# Patient Record
Sex: MALE | Race: BLACK OR AFRICAN AMERICAN | NOT HISPANIC OR LATINO | Employment: OTHER | URBAN - METROPOLITAN AREA
[De-identification: names, ages, dates, MRNs, and addresses within clinical notes are randomized per-mention and may not be internally consistent; named-entity substitution may affect disease eponyms.]

---

## 2018-05-15 ENCOUNTER — HISTORICAL (OUTPATIENT)
Dept: RADIOLOGY | Facility: HOSPITAL | Age: 43
End: 2018-05-15

## 2018-05-18 ENCOUNTER — HISTORICAL (OUTPATIENT)
Dept: ADMINISTRATIVE | Facility: HOSPITAL | Age: 43
End: 2018-05-18

## 2018-05-18 LAB
ABS NEUT (OLG): 5.41 X10(3)/MCL (ref 2.1–9.2)
BASOPHILS # BLD AUTO: 0 X10(3)/MCL (ref 0–0.2)
BASOPHILS NFR BLD AUTO: 0 %
EOSINOPHIL # BLD AUTO: 0 X10(3)/MCL (ref 0–0.9)
EOSINOPHIL NFR BLD AUTO: 1 %
ERYTHROCYTE [DISTWIDTH] IN BLOOD BY AUTOMATED COUNT: 12.8 % (ref 11.5–17)
ERYTHROCYTE [SEDIMENTATION RATE] IN BLOOD: 2 MM/HR (ref 0–15)
HCT VFR BLD AUTO: 50.6 % (ref 42–52)
HGB BLD-MCNC: 16.8 GM/DL (ref 14–18)
LYMPHOCYTES # BLD AUTO: 2.1 X10(3)/MCL (ref 0.6–4.6)
LYMPHOCYTES NFR BLD AUTO: 24 %
MCH RBC QN AUTO: 29.9 PG (ref 27–31)
MCHC RBC AUTO-ENTMCNC: 33.2 GM/DL (ref 33–36)
MCV RBC AUTO: 90.2 FL (ref 80–94)
MONOCYTES # BLD AUTO: 1 X10(3)/MCL (ref 0.1–1.3)
MONOCYTES NFR BLD AUTO: 12 %
NEUTROPHILS # BLD AUTO: 5.41 X10(3)/MCL (ref 1.4–7.9)
NEUTROPHILS NFR BLD AUTO: 63 %
PLATELET # BLD AUTO: 209 X10(3)/MCL (ref 130–400)
PMV BLD AUTO: 9.3 FL (ref 9.4–12.4)
RBC # BLD AUTO: 5.61 X10(6)/MCL (ref 4.7–6.1)
WBC # SPEC AUTO: 8.6 X10(3)/MCL (ref 4.5–11.5)

## 2018-08-09 ENCOUNTER — HISTORICAL (OUTPATIENT)
Dept: RADIOLOGY | Facility: HOSPITAL | Age: 43
End: 2018-08-09

## 2018-11-28 ENCOUNTER — HISTORICAL (OUTPATIENT)
Dept: RADIOLOGY | Facility: HOSPITAL | Age: 43
End: 2018-11-28

## 2018-12-03 ENCOUNTER — HISTORICAL (OUTPATIENT)
Dept: RADIOLOGY | Facility: HOSPITAL | Age: 43
End: 2018-12-03

## 2018-12-05 ENCOUNTER — HISTORICAL (OUTPATIENT)
Dept: PREADMISSION TESTING | Facility: HOSPITAL | Age: 43
End: 2018-12-05

## 2018-12-05 ENCOUNTER — HISTORICAL (OUTPATIENT)
Dept: LAB | Facility: HOSPITAL | Age: 43
End: 2018-12-05

## 2018-12-05 LAB
ABS NEUT (OLG): 5.08 X10(3)/MCL (ref 2.1–9.2)
APPEARANCE, UA: CLEAR
BACTERIA SPEC CULT: NORMAL /HPF
BASOPHILS # BLD AUTO: 0 X10(3)/MCL (ref 0–0.2)
BASOPHILS NFR BLD AUTO: 0 %
BILIRUB UR QL STRIP: NEGATIVE
BUN SERPL-MCNC: 13 MG/DL (ref 7–18)
CALCIUM SERPL-MCNC: 9.2 MG/DL (ref 8.5–10.1)
CHLORIDE SERPL-SCNC: 100 MMOL/L (ref 98–107)
CO2 SERPL-SCNC: 30 MMOL/L (ref 21–32)
COLOR UR: YELLOW
CREAT SERPL-MCNC: 1.19 MG/DL (ref 0.7–1.3)
CREAT/UREA NIT SERPL: 10.9
EOSINOPHIL # BLD AUTO: 0 X10(3)/MCL (ref 0–0.9)
EOSINOPHIL NFR BLD AUTO: 0 %
ERYTHROCYTE [DISTWIDTH] IN BLOOD BY AUTOMATED COUNT: 12.7 % (ref 11.5–17)
GLUCOSE (UA): NEGATIVE
GLUCOSE SERPL-MCNC: 101 MG/DL (ref 74–106)
HCT VFR BLD AUTO: 52.9 % (ref 42–52)
HGB BLD-MCNC: 17.2 GM/DL (ref 14–18)
HGB UR QL STRIP: NEGATIVE
KETONES UR QL STRIP: NEGATIVE
LEUKOCYTE ESTERASE UR QL STRIP: NEGATIVE
LYMPHOCYTES # BLD AUTO: 1.8 X10(3)/MCL (ref 0.6–4.6)
LYMPHOCYTES NFR BLD AUTO: 24 %
MCH RBC QN AUTO: 28.8 PG (ref 27–31)
MCHC RBC AUTO-ENTMCNC: 32.5 GM/DL (ref 33–36)
MCV RBC AUTO: 88.6 FL (ref 80–94)
MONOCYTES # BLD AUTO: 0.5 X10(3)/MCL (ref 0.1–1.3)
MONOCYTES NFR BLD AUTO: 7 %
NEUTROPHILS # BLD AUTO: 5.08 X10(3)/MCL (ref 2.1–9.2)
NEUTROPHILS NFR BLD AUTO: 68 %
NITRITE UR QL STRIP: NEGATIVE
PH UR STRIP: 7 [PH] (ref 5–9)
PLATELET # BLD AUTO: 222 X10(3)/MCL (ref 130–400)
PMV BLD AUTO: 10.1 FL (ref 9.4–12.4)
POTASSIUM SERPL-SCNC: 3.8 MMOL/L (ref 3.5–5.1)
PROT UR QL STRIP: NEGATIVE
RBC # BLD AUTO: 5.97 X10(6)/MCL (ref 4.7–6.1)
RBC #/AREA URNS HPF: NORMAL /[HPF]
SODIUM SERPL-SCNC: 140 MMOL/L (ref 136–145)
SP GR UR STRIP: 1.01 (ref 1–1.03)
SQUAMOUS EPITHELIAL, UA: NORMAL
UROBILINOGEN UR STRIP-ACNC: 1
WBC # SPEC AUTO: 7.5 X10(3)/MCL (ref 4.5–11.5)
WBC #/AREA URNS HPF: NORMAL /HPF

## 2018-12-12 ENCOUNTER — HISTORICAL (OUTPATIENT)
Dept: SURGERY | Facility: HOSPITAL | Age: 43
End: 2018-12-12

## 2019-03-20 ENCOUNTER — HISTORICAL (OUTPATIENT)
Dept: RADIOLOGY | Facility: HOSPITAL | Age: 44
End: 2019-03-20

## 2019-12-23 ENCOUNTER — HISTORICAL (OUTPATIENT)
Dept: RADIOLOGY | Facility: HOSPITAL | Age: 44
End: 2019-12-23

## 2020-03-17 ENCOUNTER — HISTORICAL (OUTPATIENT)
Dept: RADIOLOGY | Facility: HOSPITAL | Age: 45
End: 2020-03-17

## 2020-09-17 ENCOUNTER — HISTORICAL (OUTPATIENT)
Dept: RADIOLOGY | Facility: HOSPITAL | Age: 45
End: 2020-09-17

## 2021-04-07 ENCOUNTER — HISTORICAL (OUTPATIENT)
Dept: ADMINISTRATIVE | Facility: HOSPITAL | Age: 46
End: 2021-04-07

## 2022-03-23 ENCOUNTER — HISTORICAL (OUTPATIENT)
Dept: RADIOLOGY | Facility: HOSPITAL | Age: 47
End: 2022-03-23

## 2022-04-11 ENCOUNTER — HISTORICAL (OUTPATIENT)
Dept: ADMINISTRATIVE | Facility: HOSPITAL | Age: 47
End: 2022-04-11

## 2022-04-27 VITALS
WEIGHT: 265.44 LBS | SYSTOLIC BLOOD PRESSURE: 128 MMHG | DIASTOLIC BLOOD PRESSURE: 88 MMHG | BODY MASS INDEX: 35.95 KG/M2 | OXYGEN SATURATION: 98 % | HEIGHT: 72 IN

## 2022-06-13 ENCOUNTER — HOSPITAL ENCOUNTER (OUTPATIENT)
Dept: RADIOLOGY | Facility: HOSPITAL | Age: 47
Discharge: HOME | End: 2022-06-13
Attending: INTERNAL MEDICINE
Payer: COMMERCIAL

## 2022-06-13 DIAGNOSIS — M25.531 RIGHT WRIST PAIN: ICD-10-CM

## 2022-06-13 DIAGNOSIS — M25.531 RIGHT WRIST PAIN: Primary | ICD-10-CM

## 2022-06-13 PROCEDURE — 73100 X-RAY EXAM OF WRIST: CPT | Mod: TC,RT

## 2022-07-06 DIAGNOSIS — M25.531 RIGHT WRIST PAIN: Primary | ICD-10-CM

## 2022-07-07 ENCOUNTER — OFFICE VISIT (OUTPATIENT)
Dept: ORTHOPEDICS | Facility: CLINIC | Age: 47
End: 2022-07-07
Payer: COMMERCIAL

## 2022-07-07 VITALS — WEIGHT: 260 LBS | HEIGHT: 72 IN | BODY MASS INDEX: 35.21 KG/M2

## 2022-07-07 DIAGNOSIS — M25.331 SCAPHO-LUNATE DISSOCIATION, RIGHT: Primary | ICD-10-CM

## 2022-07-07 DIAGNOSIS — M25.531 RIGHT WRIST PAIN: ICD-10-CM

## 2022-07-07 PROCEDURE — 99213 PR OFFICE/OUTPT VISIT, EST, LEVL III, 20-29 MIN: ICD-10-PCS | Mod: ,,, | Performed by: ORTHOPAEDIC SURGERY

## 2022-07-07 PROCEDURE — 4010F ACE/ARB THERAPY RXD/TAKEN: CPT | Mod: CPTII,,, | Performed by: ORTHOPAEDIC SURGERY

## 2022-07-07 PROCEDURE — 1160F PR REVIEW ALL MEDS BY PRESCRIBER/CLIN PHARMACIST DOCUMENTED: ICD-10-PCS | Mod: CPTII,,, | Performed by: ORTHOPAEDIC SURGERY

## 2022-07-07 PROCEDURE — 1160F RVW MEDS BY RX/DR IN RCRD: CPT | Mod: CPTII,,, | Performed by: ORTHOPAEDIC SURGERY

## 2022-07-07 PROCEDURE — 99213 OFFICE O/P EST LOW 20 MIN: CPT | Mod: ,,, | Performed by: ORTHOPAEDIC SURGERY

## 2022-07-07 PROCEDURE — 3008F BODY MASS INDEX DOCD: CPT | Mod: CPTII,,, | Performed by: ORTHOPAEDIC SURGERY

## 2022-07-07 PROCEDURE — 1159F MED LIST DOCD IN RCRD: CPT | Mod: CPTII,,, | Performed by: ORTHOPAEDIC SURGERY

## 2022-07-07 PROCEDURE — 1159F PR MEDICATION LIST DOCUMENTED IN MEDICAL RECORD: ICD-10-PCS | Mod: CPTII,,, | Performed by: ORTHOPAEDIC SURGERY

## 2022-07-07 PROCEDURE — 3008F PR BODY MASS INDEX (BMI) DOCUMENTED: ICD-10-PCS | Mod: CPTII,,, | Performed by: ORTHOPAEDIC SURGERY

## 2022-07-07 PROCEDURE — 4010F PR ACE/ARB THEARPY RXD/TAKEN: ICD-10-PCS | Mod: CPTII,,, | Performed by: ORTHOPAEDIC SURGERY

## 2022-07-07 RX ORDER — MELOXICAM 15 MG/1
15 TABLET ORAL DAILY
COMMUNITY
Start: 2022-06-22 | End: 2024-01-31 | Stop reason: ALTCHOICE

## 2022-07-07 RX ORDER — LISINOPRIL AND HYDROCHLOROTHIAZIDE 20; 25 MG/1; MG/1
1 TABLET ORAL DAILY
COMMUNITY
Start: 2022-04-06

## 2022-07-07 RX ORDER — FEBUXOSTAT 40 MG/1
80 TABLET, FILM COATED ORAL DAILY
COMMUNITY
Start: 2022-06-28

## 2022-07-07 RX ORDER — DEXTROAMPHETAMINE SACCHARATE, AMPHETAMINE ASPARTATE, DEXTROAMPHETAMINE SULFATE AND AMPHETAMINE SULFATE 5; 5; 5; 5 MG/1; MG/1; MG/1; MG/1
1 TABLET ORAL 2 TIMES DAILY
COMMUNITY
Start: 2022-04-27

## 2022-07-07 NOTE — PROGRESS NOTES
Subjective:    CC: Pain of the Right Wrist and Wrist Pain (no prior sx or injury, pt been dealing with this for over 2 months, had xr done at [facility], pain level is a 6- not taking nothing for pain)       HPI:  Patient comes in today for his 1st visit.  Patient states he felt a pop in his right wrist over 2 months ago.  Since he has been having some pain and swelling.  It is not improved.  He has been in splint, treated by his PCP.  He is present here for his 1st visit.    ROS: Refer to HPI for pertinent ROS. All other 12 point systems negative.    Objective:    Physical Exam:  Patient is well-nourished developed male he is awake alert and orient x3 he has an apparent stress is pleasant and cooperative examination of the right hand and wrist compartment soft and warm.  Skin is intact.  He does have a fluid collection on the dorsal surface of the dorsal surface.  He is point tender along the proximal row.  He is able to make a full fist has full extension, is neurovascularly intact distally.    Images: . Images Reviewed and discussed with patient.    Assessment:  1. Right wrist pain  - Ambulatory referral/consult to Orthopedics    2. Scapho-lunate dissociation, right  - MRI Wrist Joint Without Contrast Right; Future        Plan:  At this time we discussed his physical exam and outside x-rays.  I am concerned for a scaphoid lunate ligament tear.  He does have a suspected Rudy Roderick sign appreciated on his outside x-rays.  He will continue with the splint we will proceed with an MRI of his right wrist, like see him back later this week for his results.    Follow UP: No follow-ups on file.

## 2022-07-08 ENCOUNTER — HOSPITAL ENCOUNTER (OUTPATIENT)
Dept: RADIOLOGY | Facility: HOSPITAL | Age: 47
Discharge: HOME | End: 2022-07-08
Attending: ORTHOPAEDIC SURGERY
Payer: COMMERCIAL

## 2022-07-08 DIAGNOSIS — M25.331 SCAPHO-LUNATE DISSOCIATION, RIGHT: ICD-10-CM

## 2022-07-08 PROCEDURE — 73221 MRI JOINT UPR EXTREM W/O DYE: CPT | Mod: TC,RT

## 2022-07-11 ENCOUNTER — OFFICE VISIT (OUTPATIENT)
Dept: ORTHOPEDICS | Facility: CLINIC | Age: 47
End: 2022-07-11
Payer: COMMERCIAL

## 2022-07-11 DIAGNOSIS — M25.331 SCAPHO-LUNATE DISSOCIATION, RIGHT: Primary | ICD-10-CM

## 2022-07-11 PROCEDURE — 4010F ACE/ARB THERAPY RXD/TAKEN: CPT | Mod: CPTII,,, | Performed by: ORTHOPAEDIC SURGERY

## 2022-07-11 PROCEDURE — 4010F PR ACE/ARB THEARPY RXD/TAKEN: ICD-10-PCS | Mod: CPTII,,, | Performed by: ORTHOPAEDIC SURGERY

## 2022-07-11 PROCEDURE — 99213 OFFICE O/P EST LOW 20 MIN: CPT | Mod: ,,, | Performed by: ORTHOPAEDIC SURGERY

## 2022-07-11 PROCEDURE — 1159F MED LIST DOCD IN RCRD: CPT | Mod: CPTII,,, | Performed by: ORTHOPAEDIC SURGERY

## 2022-07-11 PROCEDURE — 1160F PR REVIEW ALL MEDS BY PRESCRIBER/CLIN PHARMACIST DOCUMENTED: ICD-10-PCS | Mod: CPTII,,, | Performed by: ORTHOPAEDIC SURGERY

## 2022-07-11 PROCEDURE — 99213 PR OFFICE/OUTPT VISIT, EST, LEVL III, 20-29 MIN: ICD-10-PCS | Mod: ,,, | Performed by: ORTHOPAEDIC SURGERY

## 2022-07-11 PROCEDURE — 1160F RVW MEDS BY RX/DR IN RCRD: CPT | Mod: CPTII,,, | Performed by: ORTHOPAEDIC SURGERY

## 2022-07-11 PROCEDURE — 1159F PR MEDICATION LIST DOCUMENTED IN MEDICAL RECORD: ICD-10-PCS | Mod: CPTII,,, | Performed by: ORTHOPAEDIC SURGERY

## 2022-07-11 NOTE — PROGRESS NOTES
Subjective:    CC: Pain of the Right Wrist and Results (R wrist MRI results - pt is wearing a wrist brace today - he states no changes from last visit)       HPI:  Patient returns today for repeat exam.  Patient had an MRI of his right wrist.  He does have a complete tear of the SL ligament.  He is currently in a wrist brace.  He denies any new complaints.    ROS: Refer to HPI for pertinent ROS. All other 12 point systems negative.    Objective:    Physical Exam:  Examination of the right wrist he does have a palpable defect along the dorsal surface of the right wrist, he is appropriately tender in this area.  He is able to make a full fist has full extension.  He is otherwise stable to stressing, neurovascular intact distally.    Images:  MRI was reviewed. Images Reviewed and discussed with patient.    Assessment:  1. Scapho-lunate dissociation, right        Plan:  At this time we discussed his physical exam and x-ray findings.  We have discussed his MRI as well.  We have discussed various treatment options.  He would like to proceed with surgery.  We have discussed following up with 1 of my partners who performs this procedure, we will set this up at his convenience.    Follow UP: No follow-ups on file.               Birth Control Pills Counseling: Birth Control Pill Counseling: I discussed with the patient the potential side effects of OCPs including but not limited to increased risk of stroke, heart attack, thrombophlebitis, deep venous thrombosis, hepatic adenomas, breast changes, GI upset, headaches, and depression.  The patient verbalized understanding of the proper use and possible adverse effects of OCPs. All of the patient's questions and concerns were addressed.

## 2022-07-12 ENCOUNTER — OFFICE VISIT (OUTPATIENT)
Dept: ORTHOPEDICS | Facility: CLINIC | Age: 47
End: 2022-07-12
Payer: COMMERCIAL

## 2022-07-12 ENCOUNTER — CLINICAL SUPPORT (OUTPATIENT)
Dept: LAB | Facility: HOSPITAL | Age: 47
End: 2022-07-12
Attending: SPECIALIST
Payer: COMMERCIAL

## 2022-07-12 VITALS — HEIGHT: 72 IN | BODY MASS INDEX: 35.21 KG/M2 | WEIGHT: 260 LBS

## 2022-07-12 DIAGNOSIS — S63.8X1A TEAR OF RIGHT SCAPHOLUNATE LIGAMENT, INITIAL ENCOUNTER: Primary | ICD-10-CM

## 2022-07-12 DIAGNOSIS — S63.8X1A TEAR OF RIGHT SCAPHOLUNATE LIGAMENT, INITIAL ENCOUNTER: ICD-10-CM

## 2022-07-12 PROCEDURE — 1160F PR REVIEW ALL MEDS BY PRESCRIBER/CLIN PHARMACIST DOCUMENTED: ICD-10-PCS | Mod: CPTII,,, | Performed by: SPECIALIST

## 2022-07-12 PROCEDURE — 4010F PR ACE/ARB THEARPY RXD/TAKEN: ICD-10-PCS | Mod: CPTII,,, | Performed by: SPECIALIST

## 2022-07-12 PROCEDURE — 93005 ELECTROCARDIOGRAM TRACING: CPT

## 2022-07-12 PROCEDURE — 1159F MED LIST DOCD IN RCRD: CPT | Mod: CPTII,,, | Performed by: SPECIALIST

## 2022-07-12 PROCEDURE — 1159F PR MEDICATION LIST DOCUMENTED IN MEDICAL RECORD: ICD-10-PCS | Mod: CPTII,,, | Performed by: SPECIALIST

## 2022-07-12 PROCEDURE — 3008F BODY MASS INDEX DOCD: CPT | Mod: CPTII,,, | Performed by: SPECIALIST

## 2022-07-12 PROCEDURE — 4010F ACE/ARB THERAPY RXD/TAKEN: CPT | Mod: CPTII,,, | Performed by: SPECIALIST

## 2022-07-12 PROCEDURE — 99213 OFFICE O/P EST LOW 20 MIN: CPT | Mod: ,,, | Performed by: SPECIALIST

## 2022-07-12 PROCEDURE — 93010 EKG 12-LEAD: ICD-10-PCS | Mod: ,,, | Performed by: INTERNAL MEDICINE

## 2022-07-12 PROCEDURE — 99213 PR OFFICE/OUTPT VISIT, EST, LEVL III, 20-29 MIN: ICD-10-PCS | Mod: ,,, | Performed by: SPECIALIST

## 2022-07-12 PROCEDURE — 93010 ELECTROCARDIOGRAM REPORT: CPT | Mod: ,,, | Performed by: INTERNAL MEDICINE

## 2022-07-12 PROCEDURE — 1160F RVW MEDS BY RX/DR IN RCRD: CPT | Mod: CPTII,,, | Performed by: SPECIALIST

## 2022-07-12 PROCEDURE — 3008F PR BODY MASS INDEX (BMI) DOCUMENTED: ICD-10-PCS | Mod: CPTII,,, | Performed by: SPECIALIST

## 2022-07-12 RX ORDER — SODIUM CHLORIDE 9 MG/ML
INJECTION, SOLUTION INTRAVENOUS CONTINUOUS
Status: CANCELLED | OUTPATIENT
Start: 2022-07-12

## 2022-07-12 NOTE — ADDENDUM NOTE
Addended by: RIDGE SILVERMAN on: 7/12/2022 12:04 PM     Modules accepted: Fatou, SmartSet     Prior Authorization Retail Medication Request    Medication/Dose: Tolterodine  ICD code (if different than what is on RX):  na  Previously Tried and Failed:    Rationale:      Insurance Name:  [employer] Medicare  Insurance ID:  [id number]      Pharmacy Information (if different than what is on RX)  Name:  [facility]  Phone:  [phone]

## 2022-07-12 NOTE — H&P (VIEW-ONLY)
Chief Complaint:   Chief Complaint   Patient presents with    Pre-op Exam     R wrist S/L ligament pre op,          History of present illness:    This is a 47 y.o. year old male who complains of right wrist pain  Started in May when he lifted a crawfish basket  He has had x-rays and MRI   Wearing a brace for pain      Past Medical History:   Diagnosis Date    Hypertension        Past Surgical History:   Procedure Laterality Date    left elbow         Current Outpatient Medications   Medication Instructions    dextroamphetamine-amphetamine (ADDERALL) 20 mg tablet 1 tablet, Oral, 2 times daily    febuxostat (ULORIC) 40 mg, Oral, Daily    lisinopriL-hydrochlorothiazide (PRINZIDE,ZESTORETIC) 20-25 mg Tab 1 tablet, Oral, Daily    meloxicam (MOBIC) 15 mg, Oral, Daily        Review of patient's allergies indicates:  No Known Allergies    History reviewed. No pertinent family history.        Review of Systems:    Constitution:   Denies chills, fever, and sweats.  HENT:   Denies headaches or blurry vision.  Cardiovascular:  Denies chest pain or irregular heart beat.  Respiratory:   Denies cough or shortness of breath.  Gastrointestinal:  Denies abdominal pain, nausea, or vomiting.  Musculoskeletal:   Denies muscle cramps.  Neurological:   Denies dizziness or focal weakness.  Psychiatric/Behavior: Normal mental status.  Hematology/Lymph:  Denies bleeding problem or easy bruising/bleeding.  Skin:    Denies rash or suspicious lesions.    Examination:    Vital Signs:    Vitals:    07/12/22 1118   Weight: 117.9 kg (260 lb)   Height: 6' (1.829 m)       Body mass index is 35.26 kg/m².    Constitution:   Well-developed, well nourished patient in no acute distress.  Neurological:   Alert and oriented x 3 and cooperative to examination.     Psychiatric/Behavior: Normal mental status.  Respiratory:   No shortness of breath.  Eyes:    Extraoccular muscles intact  Skin:    No scars, rash or suspicious lesions.    Musculoskeletal  Exam :     No obvious deformity.  positive tenderness over radial/scaphoid junction  Limited ROM with stiffness and pain  Negative Tinel's test.  Negative Finkelstein's test.  3/5 strength, normal skin appearance and palpable pulses     X-rays and MRI reviewed  scapholunate dissociation    Assessment: There are no diagnoses linked to this encounter.    Plan:  Scapholunate ligament repain   Discussion of orthopedic surgery limitations and risks, benefits, alternatives, and complications of operative and nonoperative treatments were discussed with patient and family. They understand, agree and want to proceed with operation/procedure   Discussion of orthopedic surgery limitations and risks: recurrence of problem, pain, deformity: problems with prosthesis, prosthesis dislocation, prosthesis loosening, prosthesis failure, problems with implanted hardware, loosening of implanted hardware, failure of implanted hardware, reaction of soft tissue to implanted hardware, protrusion of implanted hardware, pain from implanted hardware, stiffness, nerve injury, vascular injury, skin necrosis, tendon adhesion.        DISCLAIMER: This note may have been dictated using voice recognition software and may contain grammatical errors.     NOTE: Consult report sent to referring provider via [employer].

## 2022-07-19 ENCOUNTER — ANESTHESIA EVENT (OUTPATIENT)
Dept: SURGERY | Facility: HOSPITAL | Age: 47
End: 2022-07-19
Payer: COMMERCIAL

## 2022-07-19 RX ORDER — CHOLECALCIFEROL (VITAMIN D3) 25 MCG
5000 TABLET ORAL DAILY
COMMUNITY

## 2022-07-25 ENCOUNTER — HOSPITAL ENCOUNTER (OUTPATIENT)
Facility: HOSPITAL | Age: 47
Discharge: HOME | End: 2022-07-25
Attending: SPECIALIST | Admitting: SPECIALIST
Payer: COMMERCIAL

## 2022-07-25 ENCOUNTER — ANESTHESIA (OUTPATIENT)
Dept: SURGERY | Facility: HOSPITAL | Age: 47
End: 2022-07-25
Payer: COMMERCIAL

## 2022-07-25 DIAGNOSIS — S63.8X1A TEAR OF RIGHT SCAPHOLUNATE LIGAMENT, INITIAL ENCOUNTER: ICD-10-CM

## 2022-07-25 PROCEDURE — 25000003 PHARM REV CODE 250: Performed by: SPECIALIST

## 2022-07-25 PROCEDURE — 25320 PR REVISE WRIST JOINT,CARPAL INSTABIL: ICD-10-PCS | Mod: AS,RT,, | Performed by: PHYSICIAN ASSISTANT

## 2022-07-25 PROCEDURE — 25320 REPAIR/REVISE WRIST JOINT: CPT | Mod: RT,,, | Performed by: SPECIALIST

## 2022-07-25 PROCEDURE — 36000706: Performed by: SPECIALIST

## 2022-07-25 PROCEDURE — 71000016 HC POSTOP RECOV ADDL HR: Performed by: SPECIALIST

## 2022-07-25 PROCEDURE — 36000707: Performed by: SPECIALIST

## 2022-07-25 PROCEDURE — 25320 PR REVISE WRIST JOINT,CARPAL INSTABIL: ICD-10-PCS | Mod: RT,,, | Performed by: SPECIALIST

## 2022-07-25 PROCEDURE — 63600175 PHARM REV CODE 636 W HCPCS: Performed by: ANESTHESIOLOGY

## 2022-07-25 PROCEDURE — 71000015 HC POSTOP RECOV 1ST HR: Performed by: SPECIALIST

## 2022-07-25 PROCEDURE — 37000008 HC ANESTHESIA 1ST 15 MINUTES: Performed by: SPECIALIST

## 2022-07-25 PROCEDURE — 25000003 PHARM REV CODE 250: Performed by: ANESTHESIOLOGY

## 2022-07-25 PROCEDURE — 64415 NJX AA&/STRD BRCH PLXS IMG: CPT | Performed by: ANESTHESIOLOGY

## 2022-07-25 PROCEDURE — 63600175 PHARM REV CODE 636 W HCPCS

## 2022-07-25 PROCEDURE — 63600175 PHARM REV CODE 636 W HCPCS: Performed by: NURSE ANESTHETIST, CERTIFIED REGISTERED

## 2022-07-25 PROCEDURE — 25320 REPAIR/REVISE WRIST JOINT: CPT | Mod: AS,RT,, | Performed by: PHYSICIAN ASSISTANT

## 2022-07-25 PROCEDURE — 37000009 HC ANESTHESIA EA ADD 15 MINS: Performed by: SPECIALIST

## 2022-07-25 PROCEDURE — 25000003 PHARM REV CODE 250: Performed by: NURSE ANESTHETIST, CERTIFIED REGISTERED

## 2022-07-25 PROCEDURE — C1713 ANCHOR/SCREW BN/BN,TIS/BN: HCPCS | Performed by: SPECIALIST

## 2022-07-25 DEVICE — ANCHOR DX SWIVELOCK SL 3.5X8: Type: IMPLANTABLE DEVICE | Site: WRIST | Status: FUNCTIONAL

## 2022-07-25 DEVICE — KIT INTERNALBRACE HAND/WRIST: Type: IMPLANTABLE DEVICE | Site: WRIST | Status: FUNCTIONAL

## 2022-07-25 RX ORDER — DEXAMETHASONE SODIUM PHOSPHATE 4 MG/ML
INJECTION, SOLUTION INTRA-ARTICULAR; INTRALESIONAL; INTRAMUSCULAR; INTRAVENOUS; SOFT TISSUE
Status: DISCONTINUED | OUTPATIENT
Start: 2022-07-25 | End: 2022-07-25

## 2022-07-25 RX ORDER — SODIUM CHLORIDE 0.9 G/100ML
IRRIGANT IRRIGATION
Status: DISCONTINUED | OUTPATIENT
Start: 2022-07-25 | End: 2022-07-25 | Stop reason: HOSPADM

## 2022-07-25 RX ORDER — SODIUM CHLORIDE 9 MG/ML
INJECTION, SOLUTION INTRAVENOUS CONTINUOUS
Status: DISCONTINUED | OUTPATIENT
Start: 2022-07-25 | End: 2022-07-25 | Stop reason: HOSPADM

## 2022-07-25 RX ORDER — ROPIVACAINE HYDROCHLORIDE 5 MG/ML
INJECTION, SOLUTION EPIDURAL; INFILTRATION; PERINEURAL
Status: DISCONTINUED | OUTPATIENT
Start: 2022-07-25 | End: 2022-07-25

## 2022-07-25 RX ORDER — ONDANSETRON 2 MG/ML
INJECTION INTRAMUSCULAR; INTRAVENOUS
Status: DISCONTINUED | OUTPATIENT
Start: 2022-07-25 | End: 2022-07-25

## 2022-07-25 RX ORDER — LIDOCAINE HYDROCHLORIDE 10 MG/ML
1 INJECTION, SOLUTION EPIDURAL; INFILTRATION; INTRACAUDAL; PERINEURAL ONCE
Status: DISCONTINUED | OUTPATIENT
Start: 2022-07-25 | End: 2022-07-25 | Stop reason: HOSPADM

## 2022-07-25 RX ORDER — GABAPENTIN 300 MG/1
300 CAPSULE ORAL
Status: COMPLETED | OUTPATIENT
Start: 2022-07-25 | End: 2022-07-25

## 2022-07-25 RX ORDER — ONDANSETRON 2 MG/ML
4 INJECTION INTRAMUSCULAR; INTRAVENOUS DAILY PRN
Status: CANCELLED | OUTPATIENT
Start: 2022-07-25

## 2022-07-25 RX ORDER — MIDAZOLAM HYDROCHLORIDE 1 MG/ML
INJECTION INTRAMUSCULAR; INTRAVENOUS
Status: DISCONTINUED | OUTPATIENT
Start: 2022-07-25 | End: 2022-07-25

## 2022-07-25 RX ORDER — ONDANSETRON 4 MG/1
4 TABLET, ORALLY DISINTEGRATING ORAL ONCE
Status: COMPLETED | OUTPATIENT
Start: 2022-07-25 | End: 2022-07-25

## 2022-07-25 RX ORDER — DIPHENHYDRAMINE HYDROCHLORIDE 50 MG/ML
25 INJECTION INTRAMUSCULAR; INTRAVENOUS EVERY 6 HOURS PRN
Status: CANCELLED | OUTPATIENT
Start: 2022-07-25

## 2022-07-25 RX ORDER — ACETAMINOPHEN 500 MG
1000 TABLET ORAL
Status: COMPLETED | OUTPATIENT
Start: 2022-07-25 | End: 2022-07-25

## 2022-07-25 RX ORDER — METOCLOPRAMIDE HYDROCHLORIDE 5 MG/ML
10 INJECTION INTRAMUSCULAR; INTRAVENOUS EVERY 10 MIN PRN
Status: CANCELLED | OUTPATIENT
Start: 2022-07-25

## 2022-07-25 RX ORDER — MIDAZOLAM HYDROCHLORIDE 1 MG/ML
2 INJECTION INTRAMUSCULAR; INTRAVENOUS ONCE AS NEEDED
Status: COMPLETED | OUTPATIENT
Start: 2022-07-25 | End: 2022-07-25

## 2022-07-25 RX ORDER — PHENYLEPHRINE HYDROCHLORIDE 10 MG/ML
INJECTION INTRAVENOUS
Status: DISCONTINUED | OUTPATIENT
Start: 2022-07-25 | End: 2022-07-25

## 2022-07-25 RX ORDER — MIDAZOLAM HYDROCHLORIDE 1 MG/ML
INJECTION INTRAMUSCULAR; INTRAVENOUS
Status: COMPLETED
Start: 2022-07-25 | End: 2022-07-25

## 2022-07-25 RX ORDER — ROPIVACAINE HYDROCHLORIDE 5 MG/ML
INJECTION, SOLUTION EPIDURAL; INFILTRATION; PERINEURAL
Status: DISCONTINUED
Start: 2022-07-25 | End: 2022-07-25 | Stop reason: HOSPADM

## 2022-07-25 RX ORDER — CEFAZOLIN SODIUM IN 0.9 % NACL 2 G/100 ML
PLASTIC BAG, INJECTION (ML) INTRAVENOUS
Status: DISCONTINUED | OUTPATIENT
Start: 2022-07-25 | End: 2022-07-25

## 2022-07-25 RX ORDER — OXYCODONE AND ACETAMINOPHEN 5; 325 MG/1; MG/1
1 TABLET ORAL EVERY 4 HOURS PRN
Qty: 24 TABLET | Refills: 0 | Status: SHIPPED | OUTPATIENT
Start: 2022-07-25 | End: 2022-08-01 | Stop reason: SDUPTHER

## 2022-07-25 RX ORDER — SODIUM CHLORIDE, SODIUM GLUCONATE, SODIUM ACETATE, POTASSIUM CHLORIDE AND MAGNESIUM CHLORIDE 30; 37; 368; 526; 502 MG/100ML; MG/100ML; MG/100ML; MG/100ML; MG/100ML
1000 INJECTION, SOLUTION INTRAVENOUS CONTINUOUS
Status: DISCONTINUED | OUTPATIENT
Start: 2022-07-25 | End: 2022-07-25 | Stop reason: HOSPADM

## 2022-07-25 RX ORDER — CEFAZOLIN SODIUM 2 G/50ML
2 SOLUTION INTRAVENOUS
Status: DISCONTINUED | OUTPATIENT
Start: 2022-07-25 | End: 2022-07-25 | Stop reason: HOSPADM

## 2022-07-25 RX ORDER — SODIUM CHLORIDE 0.9 % (FLUSH) 0.9 %
3 SYRINGE (ML) INJECTION
Status: DISCONTINUED | OUTPATIENT
Start: 2022-07-25 | End: 2022-07-25 | Stop reason: HOSPADM

## 2022-07-25 RX ORDER — PROPOFOL 10 MG/ML
VIAL (ML) INTRAVENOUS CONTINUOUS PRN
Status: DISCONTINUED | OUTPATIENT
Start: 2022-07-25 | End: 2022-07-25

## 2022-07-25 RX ORDER — HYDROMORPHONE HYDROCHLORIDE 2 MG/ML
0.2 INJECTION, SOLUTION INTRAMUSCULAR; INTRAVENOUS; SUBCUTANEOUS EVERY 5 MIN PRN
Status: CANCELLED | OUTPATIENT
Start: 2022-07-25

## 2022-07-25 RX ADMIN — SODIUM CHLORIDE, SODIUM GLUCONATE, SODIUM ACETATE, POTASSIUM CHLORIDE AND MAGNESIUM CHLORIDE 1000 ML: 526; 502; 368; 37; 30 INJECTION, SOLUTION INTRAVENOUS at 07:07

## 2022-07-25 RX ADMIN — PHENYLEPHRINE HYDROCHLORIDE 100 MCG: 10 INJECTION INTRAVENOUS at 10:07

## 2022-07-25 RX ADMIN — GABAPENTIN 300 MG: 300 CAPSULE ORAL at 07:07

## 2022-07-25 RX ADMIN — ONDANSETRON 4 MG: 4 TABLET, ORALLY DISINTEGRATING ORAL at 07:07

## 2022-07-25 RX ADMIN — MIDAZOLAM 2 MG: 1 INJECTION INTRAMUSCULAR; INTRAVENOUS at 07:07

## 2022-07-25 RX ADMIN — PHENYLEPHRINE HYDROCHLORIDE 50 MCG: 10 INJECTION INTRAVENOUS at 09:07

## 2022-07-25 RX ADMIN — MIDAZOLAM HYDROCHLORIDE 2 MG: 1 INJECTION INTRAMUSCULAR; INTRAVENOUS at 07:07

## 2022-07-25 RX ADMIN — PHENYLEPHRINE HYDROCHLORIDE 100 MCG: 10 INJECTION INTRAVENOUS at 09:07

## 2022-07-25 RX ADMIN — Medication 2 G: at 08:07

## 2022-07-25 RX ADMIN — SODIUM CHLORIDE, SODIUM GLUCONATE, SODIUM ACETATE, POTASSIUM CHLORIDE AND MAGNESIUM CHLORIDE: 526; 502; 368; 37; 30 INJECTION, SOLUTION INTRAVENOUS at 08:07

## 2022-07-25 RX ADMIN — MIDAZOLAM 2 MG: 1 INJECTION INTRAMUSCULAR; INTRAVENOUS at 08:07

## 2022-07-25 RX ADMIN — ACETAMINOPHEN 1000 MG: 500 TABLET ORAL at 07:07

## 2022-07-25 RX ADMIN — PROPOFOL 100 MCG/KG/MIN: 10 INJECTION, EMULSION INTRAVENOUS at 08:07

## 2022-07-25 RX ADMIN — ONDANSETRON HYDROCHLORIDE 4 MG: 2 SOLUTION INTRAMUSCULAR; INTRAVENOUS at 08:07

## 2022-07-25 RX ADMIN — ROPIVACAINE HYDROCHLORIDE 30 ML: 5 INJECTION, SOLUTION EPIDURAL; INFILTRATION; PERINEURAL at 07:07

## 2022-07-25 RX ADMIN — DEXAMETHASONE SODIUM PHOSPHATE 8 MG: 4 INJECTION, SOLUTION INTRA-ARTICULAR; INTRALESIONAL; INTRAMUSCULAR; INTRAVENOUS; SOFT TISSUE at 08:07

## 2022-07-25 NOTE — OP NOTE
[facility]                   [address] [place] [address]    PATIENT NAME:      GUI THOMPSON   YOB: 1975  CSN:               [id number]  MRN:               [id number]  ADMIT DATE:        07/25/2022 05:59:00  PHYSICIAN:         Garry Gray MD                          OPERATIVE REPORT      DATE OF SURGERY:    07/25/2022 00:00:00    SURGEON:  Garry Gray MD    PREOPERATIVE DIAGNOSIS:  Right scapholunate disruption, chronic.    POSTOPERATIVE DIAGNOSIS:  Right scapholunate disruption, chronic.    OPERATION:  Scapholunate ligament reconstruction with ligament augmentation   using Arthrex tape and graft from extensor carpi radialis brevis.    ASSISTANT:  FAIZAN Andino.    PROCEDURE IN DETAIL:  Patient was brought in the operating room, placed on the   operating table in supine position.  Patient administered general anesthetic.    His right upper extremity was elevated.  Pneumatic tourniquet was placed around   the upper arm.  The extremity was then prepped for 10 minutes with Betadine   scrub brush, painted with Betadine antiseptic solution, and draped out   sterilely.  The patient then had the arm exsanguinated.  Tourniquet was inflated   to 250 mmHg.      Over a dorsal incision, dissecting down, the wrist extensor retinaculum was   identified.  It was slightly opened, and extensor carpi radialis brevis was   identified.  A third of the tendon was harvested and whipstitched with the   Arthrex sutures.  The scapholunate joint was opened, and the scaphoid and lunate   were identified.  The scaphoid was rotated completely vertical and, with 2   K-wires, 1 in the lunate and 1 in the scaphoid, the joint was reduced and   visualized on C-arm.  An Arthrex guide was used to make 2 drill holes in the   scaphoid, 1 at the distal pole and 1 proximal, and then 1 in the lunate.  Then,   using the graft  and tape, the graft and tape were inserted into the scaphoid and   then into the lunate with the joint reduced, and then brought back to the   scaphoid.  At that point, the patient had solid stability of that joint and a   K-wire was placed across the scaphocapitate.  At that point, the patient had the   tourniquet deflated, hemostasis obtained.  The capsule of the wrist joint was   closed with Vicryl.  The dorsal wrist retinaculum was closed with Vicryl.  The   subcutaneous tissue was closed with Vicryl.  The skin was then closed with   sutures.  The patient had a sterile dressing applied, along with a splint, and   he was taken from surgery to recovery in satisfactory condition.        ______________________________  MD NAIN Wilson/AQS  DD:  07/25/2022  Time:  10:12AM  DT:  07/25/2022  Time:  10:56AM  Job #:  [id number]      OPERATIVE REPORT

## 2022-07-25 NOTE — DISCHARGE INSTRUCTIONS
[facility] DIS[place]. [address]  [phone]    DIET    YOUR FIRST MEAL SHOULD BE LIQUID: I.E. SOUPS, JELLO, JUICE. IF YOUR LIQUID MEAL IS TOLERATED WELL THEN YOU MAY PROGRESS TO A SMALL LIGHT MEAL.   IF NAUSEA AND VOMITING OCCUR RETURN TO THE LIQUID DIET AND PROGRESS TO A NORMAL SOLID DIET SLOWLY.  IF THE NAUSEA AND VOMITING DOES NOT STOP, NOTIFY YOUR HEALTH CARE PROVIDER.      GENERAL ANESTHESIA OR SEDATION    DO NOT DRIVE OR PARTICIPATE IN ANY ACTIVITIES THAT REQUIRE COORDINATION FOR THE NEXT 24 HOURS: I.E. SWIMMING, BIKING, OPERATING HEAVY MACHINERY, COOKING, USING POWER TOOLS, CLIMBING LADDERS.   FOR THE NEXT 24 HOURS DO NOT: DRIVE, DRINK ALCOHOL, MAKE ANY IMPORTANT DECISIONS OR SIGN ANY LEGAL DOCUMENTS.   STAY WITH AN ADULT DURING THE 24 HOURS AFTER YOUR SURGERY.   DRINK ENOUGH FLUIDS TO KEEP YOUR URINE CLEAR TO PALE YELLOW.      PREVENTING CONSTIPATION AFTER SURGERY    EAT FOOD HIGH IN FIBER AND DRINK PLENTY OF FLUIDS, ESPECIALLY WATER.   YOU MAY TAKE AN OVER THE COUNTER FIBER SUPPLEMENT OR STOOL SOFTENER AS DIRECTED ON THE PACKAGE.   STAYING MOBILE, IF POSSIBLE, HELPS TO PREVENT CONSTIPATION.  CONTACT YOUR DOCTOR IF YOU HAVE NOT HAD A BOWEL MOVEMENT IN 3 DAYS AFTER SURGERY.       INFECTION CONTROL    KEEP YOUR DRESSING CLEAN, DRY AND INTACT.   FOLLOW PHYSICIAN INSTRUCTIONS REGARDING REMOVAL OF DRESSING.  DO NOT TOUCH SURGICAL SITE OR APPLY LOTIONS, POWDERS, CREAMS OR OINTMENTS ON YOUR INCISION UNLESS INSTRUCTED TO DO SO BY YOUR HEALTH CARE PROVIDER.  ONCE THE DRESSING HAS BEEN REMOVED, CHECK THE INCISION SITE DAILY FOR: INCREASED REDNESS, SWELLING, FLUID OR BLOOD, WARMTH, PUS, FOUL SMELL OR INCREASED PAIN.      DEEP VEIN THROMBOSIS (DVT)    A DVT IS A BLOOD CLOT THAT CAN DEVELOP IN THE DEEP AND LARGER VEINS OF THE LEG, ARM OR PELVIS.   RISK FACTORS INCLUDE SITTING OR LYING FOR LONG PERIODS OF TIME. THIS INCLUDES RECOVERING FROM SURGERY.  PREVENTION INCLUDES:  AVOID SITTING STILL FOR LONG PERIODS WITHOUT MOVING YOUR LEGS, DO NOT SMOKE AND IF POSSIBLE AVOID MEDICATIONS THAT CONTAIN ESTROGEN.   SIGNS AND SYMPTOMS THAT SHOULD BE REPORTED IMMEDIATELY INCLUDE: SWELLING IN AN ARM OR LEG, WARMTH, REDNESS OR PAIN IN ONE AREA OF THE LEG OR ARM THAT DOES NOT COME FROM THE INCISION. IF THE CLOT IS IN YOUR LEG, THE SYMPTOMS WILL BE WORSE WHEN STANDING OR WALKING.   SIGNS OF A PULMONARY EMBOLISM OR PE (A CLOT THAT MOVED TO YOUR LUNG): SHORTNESS OF BREATH, COUGHING , ESPECIALLY IF ACCOMPANIED WITH BLOODY MUCUS, CHEST PAIN OR RAPID HEART RATE.  IF YOU EXPERIENCE THESE SYMPTOMS, YOU SHOULD GET EMERGENCY TREATMENT RIGHT AWAY. DO NOT WAIT TO SEE IF THESE SYMPTOMS WILL GO AWAY. DO NOT DRIVE YOURSELF TO THE HOSPITAL.       CONTACT YOUR HEALTH CARE PROVIDER IF:    YOU HAVE REDNESS, SWELLING OR PAIN AROUND YOUR INCISION.  YOUR INCISION FEELS WARM TO THE TOUCH OR IS LEAKING EXCESSIVE FLUID/ BLOOD.  YOUR SUTURES OR STAPLES HAVE COME UNDONE.  YOU HAVE A TEMPERATURE .5 OR GREATER.  YOU ARE NOT ABLE TO URINATE WITHIN 6 HOURS AFTER SURGERY.  YOU HAVE UNRESOLVED NAUSEA AND VOMITING.       SEEK IMMEDIATE MEDICAL CARE IF:    YOU HAVE PERSISTENT NAUSEA AND VOMITING.   YOU ARE UNABLE TO EAT OR DRINK.   YOU HAVE DIFFICULTY SPEAKING AND/ OR BREATHING.  YOUR SKIN COLOR APPEARS BLUE OR GRAY.  YOU HAVE A RED STREAK COMING FROM YOUR INCISION.  YOUR INCISION BLEEDS THROUGH THE DRESSING AND DOES NOT STOP WITH GENTLY PRESSURE.  YOU HAVE SEVERE PAIN THAT DOES NOT DECREASE WITH YOUR MEDICATIONS.

## 2022-07-25 NOTE — OR NURSING
07:44  TIMEOUT DONE    07:52  DR. AGUILA WILL ATTEMPT TO DO A RIGHT SUPRACLAVICULAR NERVE BLOCK.    07:55  BLOCK COMPLETED AND TOLERATED WELL.

## 2022-07-25 NOTE — ANESTHESIA POSTPROCEDURE EVALUATION
Anesthesia Post Evaluation    Patient: Amadeo Jha    Procedure(s) Performed: Procedure(s) (LRB):  REPAIR, LIGAMENT, WRIST (Right)    Final Anesthesia Type: regional      Patient location during evaluation: OPS  Patient participation: Yes- Able to Participate  Level of consciousness: awake and alert and oriented  Post-procedure vital signs: reviewed and stable  Pain management: adequate  Airway patency: patent    PONV status at discharge: No PONV, nausea (controlled) and vomiting (controlled)  Anesthetic complications: no      Cardiovascular status: blood pressure returned to baseline and stable  Respiratory status: unassisted  Hydration status: euvolemic  Follow-up not needed.          Vitals Value Taken Time   /84 07/25/22 1101   Temp 37 07/25/22 1348   Pulse 67 07/25/22 1108   Resp 16 07/25/22 1045   SpO2 95 % 07/25/22 1108   Vitals shown include unvalidated device data.      No case tracking events are documented in the log.      Pain/Radha Score: Pain Rating Prior to Med Admin: 4 (7/25/2022  7:06 AM)  Modified Radha Score: 19 (7/25/2022 10:54 AM)

## 2022-07-25 NOTE — BRIEF OP NOTE
[facility] Orthopaedics - Periop Services  Brief Operative Note    Surgery Date: 7/25/2022     Surgeon(s) and Role:     * Garry Gray MD - Primary    Assisting Surgeon: None    Pre-op Diagnosis:  * No pre-op diagnosis entered *    Post-op Diagnosis:  Post-Op Diagnosis Codes:     * Tear of right scapholunate ligament, initial encounter [S63.8X1A]    Procedure(s) (LRB):  REPAIR, LIGAMENT, WRIST (Right)    Anesthesia: Regional    Operative Findings: * the patient had a disrupted scapholunate ligament this was reconstructed intraoperatively with the graft and augmented with a FiberTape    Estimated Blood Loss: * No values recorded between 7/25/2022  8:58 AM and 7/25/2022 10:13 AM *         Specimens:   Specimen (24h ago, onward)            None            Discharge Note    OUTCOME: Patient tolerated treatment/procedure well without complication and is now ready for discharge.    DISPOSITION: Home or Self Care    FINAL DIAGNOSIS:  <principal problem not specified>    FOLLOWUP: In clinic    DISCHARGE INSTRUCTIONS:    Discharge Procedure Orders   Diet Adult Regular     Other restrictions (specify):   Order Comments: Keep splint on until f/u appt. No lifting with affected extremity     Leave dressing on - Keep it clean, dry, and intact until clinic visit

## 2022-07-25 NOTE — INTERVAL H&P NOTE
The patient has been examined and the H&P has been reviewed:    I concur with the findings and changes have been noted since the H&P was written:      Surgery risks, benefits and alternative options discussed and understood by patient/family.          There are no hospital problems to display for this patient.

## 2022-07-25 NOTE — DISCHARGE SUMMARY
[facility]  Discharge Note  Short Stay    Admit Date: 7/25/2022    Discharge Date and Time: No discharge date for patient encounter.     Attending Physician: Garry Gray MD     Discharge Provider: Sharath Welch    Diagnoses:  There are no hospital problems to display for this patient.      Discharged Condition: good    Hospital Course: Patient was admitted for an outpatient procedure and tolerated the procedure well with no complications.    Final Diagnoses: Same as principal problem.    Disposition: Home or Self Care    Follow up/Patient Instructions:    Medications:  Reconciled Home Medications:      Medication List      CONTINUE taking these medications    dextroamphetamine-amphetamine 20 mg tablet  Commonly known as: ADDERALL  Take 1 tablet by mouth 2 (two) times daily.     febuxostat 40 mg Tab  Commonly known as: ULORIC  Take 40 mg by mouth once daily.     lisinopriL-hydrochlorothiazide 20-25 mg Tab  Commonly known as: PRINZIDE,ZESTORETIC  Take 1 tablet by mouth once daily.     vitamin D 1000 units Tab  Commonly known as: VITAMIN D3  Take 1,000 Units by mouth once daily.        ASK your doctor about these medications    meloxicam 15 MG tablet  Commonly known as: MOBIC  Take 15 mg by mouth once daily.          Discharge Procedure Orders   Diet Adult Regular     Other restrictions (specify):   Order Comments: Keep splint on until f/u appt. No lifting with affected extremity     Leave dressing on - Keep it clean, dry, and intact until clinic visit      Follow-up Information     Garry Gray MD Follow up in 2 week(s).    Specialty: Orthopedic Surgery  Contact information:  [address]  [address]  [facility] [address]  [phone]                         Discharge Procedure Orders (must include Diet, Follow-up, Activity):   Discharge Procedure Orders (must include Diet, Follow-up, Activity)   Diet Adult Regular     Other restrictions (specify):   Order Comments: Keep splint on until f/u  appt. No lifting with affected extremity     Leave dressing on - Keep it clean, dry, and intact until clinic visit    [facility] Orthopaedics - Periop Services  Discharge Note  Short Stay    Procedure(s) (LRB):  REPAIR, LIGAMENT, WRIST (Right)    OUTCOME: Patient tolerated treatment/procedure well without complication and is now ready for discharge.    DISPOSITION: Home or Self Care    FINAL DIAGNOSIS:  <principal problem not specified>    FOLLOWUP: In clinic    DISCHARGE INSTRUCTIONS:    Discharge Procedure Orders   Diet Adult Regular     Other restrictions (specify):   Order Comments: Keep splint on until f/u appt. No lifting with affected extremity     Leave dressing on - Keep it clean, dry, and intact until clinic visit        TIME SPENT ON DISCHARGE: 10 minutes

## 2022-07-25 NOTE — ANESTHESIA PROCEDURE NOTES
Peripheral Block    Patient location during procedure: pre-op   Block not for primary anesthetic.  Reason for block: at surgeon's request and post-op pain management   Post-op Pain Location: Right Shoulder Rotator cuff   Start time: 7/25/2022 7:52 AM  Timeout: 7/25/2022 7:50 AM   End time: 7/25/2022 7:55 AM    Staffing  Authorizing Provider: Roc Panda MD  Performing Provider: Roc Panda MD    Preanesthetic Checklist  Completed: patient identified, IV checked, site marked, risks and benefits discussed, surgical consent, monitors and equipment checked, pre-op evaluation and timeout performed  Peripheral Block  Patient position: supine  Prep: ChloraPrep  Patient monitoring: heart rate, cardiac monitor, continuous pulse ox, continuous capnometry and frequent blood pressure checks  Block type: supraclavicular  Laterality: right  Injection technique: single shot  Needle  Needle type: Stimuplex   Needle gauge: 22 G  Needle length: 4 in  Needle localization: anatomical landmarks and ultrasound guidance   -ultrasound image captured on disc.  Assessment  Injection assessment: negative aspiration, negative parasthesia and local visualized surrounding nerve  Paresthesia pain: none  Heart rate change: no  Slow fractionated injection: yes  Pain Tolerance: comfortable throughout block and no complaints  Medications:    Medications: ropivacaine (NAROPIN) injection 0.5% - Perineural   30 mL - 7/25/2022 7:52:00 AM    Additional Notes  VSS.  [facility] RN monitoring vitals throughout procedure.  Patient tolerated procedure well.

## 2022-07-25 NOTE — ANESTHESIA PREPROCEDURE EVALUATION
07/25/2022  Amadeo Jha is a 47 y.o., male presents with Wrist pain due to Scapholunate dissociation/tear while lifting on heavy weight(Crawfish basket)..  Diagnosis:   Tear of right scapholunate ligament, initial encounter    He comes to [facility] for the noted procedure under GA/LMA vs GETA with Supraclavicular block for postOp pain.  Procedure:   REPAIR, LIGAMENT, WRIST (Right )      PMHx:  Hypertension ADD (attention deficit disorder)     Surgical History  left elbow CERVICAL FUSION   KNEE ARTHROSCOPY W/ ACL RECONSTRUCTION      Echo:         EKG:      Lab Data:        Vital signs:      Pre-op Assessment    I have reviewed the Patient Summary Reports.     I have reviewed the Nursing Notes. I have reviewed the NPO Status.   I have reviewed the Medications.     Review of Systems  Anesthesia Hx:  No problems with previous Anesthesia    Social:  Non-Smoker    Hematology/Oncology:  Hematology Normal   Oncology Normal     EENT/Dental:EENT/Dental Normal   Cardiovascular:   Exercise tolerance: good Hypertension  Functional Capacity good / => 4 METS    Pulmonary:  Pulmonary Normal    Renal/:  Renal/ Normal     Hepatic/GI:  Hepatic/GI Normal    Musculoskeletal:  Musculoskeletal Normal    Neurological:  Neurology Normal    Endocrine:  Endocrine Normal    Dermatological:  Skin Normal    Psych:   Psychiatric History          Physical Exam  General: Alert, Oriented, Well nourished and Cooperative    Airway:  Mallampati: II   Mouth Opening: Normal  TM Distance: Normal  Tongue: Normal  Neck ROM: Normal ROM    Dental:  Intact    Chest/Lungs:  Clear to auscultation, Normal Respiratory Rate    Heart:  Rate: Normal  Rhythm: Regular Rhythm        Anesthesia Plan  Type of Anesthesia, risks & benefits discussed:    Anesthesia Type: Gen Supraglottic Airway  Intra-op Monitoring Plan: Standard ASA Monitors  Post Op Pain Control  Plan: multimodal analgesia, IV/PO Opioids PRN and peripheral nerve block  Induction:  IV  Airway Plan: Direct  Informed Consent: Informed consent signed with the Patient and all parties understand the risks and agree with anesthesia plan.  All questions answered. Patient consented to blood products? Yes  ASA Score: 3  Day of Surgery Review of History & Physical: H&P Update referred to the surgeon/provider.    Ready For Surgery From Anesthesia Perspective.     .

## 2022-07-25 NOTE — TRANSFER OF CARE
Anesthesia Transfer of Care Note    Patient: Amadeo Jha    Procedure(s) Performed: Procedure(s) (LRB):  REPAIR, LIGAMENT, WRIST (Right)    Patient location: PACU    Anesthesia Type: general    Transport from OR: Transported from OR on room air with adequate spontaneous ventilation    Post pain: adequate analgesia    Post assessment: no apparent anesthetic complications    Post vital signs: stable    Level of consciousness: sedated, awake and responds to stimulation    Nausea/Vomiting: no nausea/vomiting    Complications: none    Transfer of care protocol was followed      Last vitals:   Visit Vitals  BP (!) 130/94   Pulse 70   Temp (!) 35.5 °C (95.9 °F) (Tympanic)   Resp 16   Ht 6' (1.829 m)   Wt 115.4 kg (254 lb 6.6 oz)   SpO2 (!) 93%   BMI 34.50 kg/m²

## 2022-07-26 ENCOUNTER — TELEPHONE (OUTPATIENT)
Dept: ORTHOPEDICS | Facility: CLINIC | Age: 47
End: 2022-07-26
Payer: COMMERCIAL

## 2022-07-26 VITALS
DIASTOLIC BLOOD PRESSURE: 84 MMHG | WEIGHT: 254.44 LBS | RESPIRATION RATE: 16 BRPM | TEMPERATURE: 96 F | OXYGEN SATURATION: 96 % | HEART RATE: 68 BPM | SYSTOLIC BLOOD PRESSURE: 118 MMHG | BODY MASS INDEX: 34.46 KG/M2 | HEIGHT: 72 IN

## 2022-07-26 NOTE — TELEPHONE ENCOUNTER
PATIENT WIFE CALLED PATIENT IS IN A LOT OF PAIN AND THEY ARE REQUESTING A STRONGER MEDICATION DUE TO THE PERCOCET 5 NOT HELPING. PLEASE ADVISE

## 2022-08-01 RX ORDER — OXYCODONE AND ACETAMINOPHEN 5; 325 MG/1; MG/1
1 TABLET ORAL EVERY 4 HOURS PRN
Qty: 24 TABLET | Refills: 0 | Status: SHIPPED | OUTPATIENT
Start: 2022-08-01 | End: 2022-08-22 | Stop reason: SDUPTHER

## 2022-08-09 ENCOUNTER — OFFICE VISIT (OUTPATIENT)
Dept: ORTHOPEDICS | Facility: CLINIC | Age: 47
End: 2022-08-09
Payer: COMMERCIAL

## 2022-08-09 ENCOUNTER — HOSPITAL ENCOUNTER (OUTPATIENT)
Dept: RADIOLOGY | Facility: CLINIC | Age: 47
Discharge: HOME | End: 2022-08-09
Attending: SPECIALIST
Payer: COMMERCIAL

## 2022-08-09 VITALS — WEIGHT: 254 LBS | BODY MASS INDEX: 34.4 KG/M2 | HEIGHT: 72 IN

## 2022-08-09 DIAGNOSIS — S63.8X1A TEAR OF RIGHT SCAPHOLUNATE LIGAMENT, INITIAL ENCOUNTER: ICD-10-CM

## 2022-08-09 DIAGNOSIS — S63.8X1A TEAR OF RIGHT SCAPHOLUNATE LIGAMENT, INITIAL ENCOUNTER: Primary | ICD-10-CM

## 2022-08-09 PROCEDURE — 1160F RVW MEDS BY RX/DR IN RCRD: CPT | Mod: CPTII,,, | Performed by: SPECIALIST

## 2022-08-09 PROCEDURE — 1159F MED LIST DOCD IN RCRD: CPT | Mod: CPTII,,, | Performed by: SPECIALIST

## 2022-08-09 PROCEDURE — 99024 POSTOP FOLLOW-UP VISIT: CPT | Mod: ,,, | Performed by: SPECIALIST

## 2022-08-09 PROCEDURE — 3008F BODY MASS INDEX DOCD: CPT | Mod: CPTII,,, | Performed by: SPECIALIST

## 2022-08-09 PROCEDURE — 1159F PR MEDICATION LIST DOCUMENTED IN MEDICAL RECORD: ICD-10-PCS | Mod: CPTII,,, | Performed by: SPECIALIST

## 2022-08-09 PROCEDURE — 73110 X-RAY EXAM OF WRIST: CPT | Mod: RT,,, | Performed by: SPECIALIST

## 2022-08-09 PROCEDURE — 99024 PR POST-OP FOLLOW-UP VISIT: ICD-10-PCS | Mod: ,,, | Performed by: SPECIALIST

## 2022-08-09 PROCEDURE — 73110 XR WRIST COMPLETE 3 VIEWS RIGHT: ICD-10-PCS | Mod: RT,,, | Performed by: SPECIALIST

## 2022-08-09 PROCEDURE — 1160F PR REVIEW ALL MEDS BY PRESCRIBER/CLIN PHARMACIST DOCUMENTED: ICD-10-PCS | Mod: CPTII,,, | Performed by: SPECIALIST

## 2022-08-09 PROCEDURE — 4010F PR ACE/ARB THEARPY RXD/TAKEN: ICD-10-PCS | Mod: CPTII,,, | Performed by: SPECIALIST

## 2022-08-09 PROCEDURE — 3008F PR BODY MASS INDEX (BMI) DOCUMENTED: ICD-10-PCS | Mod: CPTII,,, | Performed by: SPECIALIST

## 2022-08-09 PROCEDURE — 4010F ACE/ARB THERAPY RXD/TAKEN: CPT | Mod: CPTII,,, | Performed by: SPECIALIST

## 2022-08-09 NOTE — PROGRESS NOTES
Chief Complaint:   Chief Complaint   Patient presents with    Post-op Evaluation     post op right wrist ligament repain on 7/25/22 states he has been doing good no complaints. pin is still intact          History of present illness:    This is a 47 y.o. year old male who complains of mild wrist pain     Past Medical History:   Diagnosis Date    ADD (attention deficit disorder)     Hypertension        Past Surgical History:   Procedure Laterality Date    CERVICAL FUSION      KNEE ARTHROSCOPY W/ ACL RECONSTRUCTION Right     left elbow      REPAIR OF LIGAMENT OF WRIST Right 7/25/2022    Procedure: REPAIR, LIGAMENT, WRIST;  Surgeon: Garry Gray MD;  Location: [facility];  Service: Orthopedics;  Laterality: Right;       Current Outpatient Medications   Medication Instructions    dextroamphetamine-amphetamine (ADDERALL) 20 mg tablet 1 tablet, Oral, 2 times daily    febuxostat (ULORIC) 40 mg, Oral, Daily    lisinopriL-hydrochlorothiazide (PRINZIDE,ZESTORETIC) 20-25 mg Tab 1 tablet, Oral, Daily    meloxicam (MOBIC) 15 mg, Oral, Daily    oxyCODONE-acetaminophen (PERCOCET) 5-325 mg per tablet 1 tablet, Oral, Every 4 hours PRN    vitamin D (VITAMIN D3) 1,000 Units, Oral, Daily        Review of patient's allergies indicates:  No Known Allergies    History reviewed. No pertinent family history.        Review of Systems:    Constitution:   Denies chills, fever, and sweats.  HENT:   Denies headaches or blurry vision.  Cardiovascular:  Denies chest pain or irregular heart beat.  Respiratory:   Denies cough or shortness of breath.  Gastrointestinal:  Denies abdominal pain, nausea, or vomiting.  Musculoskeletal:   Denies muscle cramps.  Neurological:   Denies dizziness or focal weakness.  Psychiatric/Behavior: Normal mental status.  Hematology/Lymph:  Denies bleeding problem or easy bruising/bleeding.  Skin:    Denies rash or suspicious lesions.    Examination:    Vital Signs:    Vitals:    08/09/22 1455   Weight:  115.2 kg (254 lb)   Height: 6' (1.829 m)       Body mass index is 34.45 kg/m².    Constitution:   Well-developed, well nourished patient in no acute distress.  Neurological:   Alert and oriented x 3 and cooperative to examination.     Psychiatric/Behavior: Normal mental status.  Respiratory:   No shortness of breath.  Eyes:    Extraoccular muscles intact  Skin:    No scars, rash or suspicious lesions.    Musculoskeletal Exam :   Wound is healed    Three views of the right wrist were taken the patient has postsurgical changes with restoration of the scapholunate interval and also a pin is  in place.     Assessment: Tear of right scapholunate ligament, initial encounter  -     X-Ray Wrist Complete Right; Future; Expected date: 08/09/2022        Plan:  Plans have patient return 1 month and remove the pin and repeat x-rays      DISCLAIMER: This note may have been dictated using voice recognition software and may contain grammatical errors.     NOTE: Consult report sent to referring provider via [employer].

## 2022-08-23 RX ORDER — OXYCODONE AND ACETAMINOPHEN 5; 325 MG/1; MG/1
1 TABLET ORAL EVERY 4 HOURS PRN
Qty: 24 TABLET | Refills: 0 | Status: ON HOLD | OUTPATIENT
Start: 2022-08-23 | End: 2024-03-27 | Stop reason: HOSPADM

## 2022-09-01 ENCOUNTER — OFFICE VISIT (OUTPATIENT)
Dept: ORTHOPEDICS | Facility: CLINIC | Age: 47
End: 2022-09-01
Payer: COMMERCIAL

## 2022-09-01 ENCOUNTER — HOSPITAL ENCOUNTER (OUTPATIENT)
Dept: RADIOLOGY | Facility: CLINIC | Age: 47
Discharge: HOME | End: 2022-09-01
Attending: PHYSICIAN ASSISTANT
Payer: COMMERCIAL

## 2022-09-01 VITALS — WEIGHT: 254 LBS | BODY MASS INDEX: 34.4 KG/M2 | HEIGHT: 72 IN

## 2022-09-01 DIAGNOSIS — S63.8X1A TEAR OF RIGHT SCAPHOLUNATE LIGAMENT, INITIAL ENCOUNTER: ICD-10-CM

## 2022-09-01 DIAGNOSIS — S63.8X1A TEAR OF RIGHT SCAPHOLUNATE LIGAMENT, INITIAL ENCOUNTER: Primary | ICD-10-CM

## 2022-09-01 PROCEDURE — 3008F BODY MASS INDEX DOCD: CPT | Mod: CPTII,,, | Performed by: PHYSICIAN ASSISTANT

## 2022-09-01 PROCEDURE — 1159F PR MEDICATION LIST DOCUMENTED IN MEDICAL RECORD: ICD-10-PCS | Mod: CPTII,,, | Performed by: PHYSICIAN ASSISTANT

## 2022-09-01 PROCEDURE — 99024 POSTOP FOLLOW-UP VISIT: CPT | Mod: ,,, | Performed by: PHYSICIAN ASSISTANT

## 2022-09-01 PROCEDURE — 1159F MED LIST DOCD IN RCRD: CPT | Mod: CPTII,,, | Performed by: PHYSICIAN ASSISTANT

## 2022-09-01 PROCEDURE — 3008F PR BODY MASS INDEX (BMI) DOCUMENTED: ICD-10-PCS | Mod: CPTII,,, | Performed by: PHYSICIAN ASSISTANT

## 2022-09-01 PROCEDURE — 4010F PR ACE/ARB THEARPY RXD/TAKEN: ICD-10-PCS | Mod: CPTII,,, | Performed by: PHYSICIAN ASSISTANT

## 2022-09-01 PROCEDURE — 73110 X-RAY EXAM OF WRIST: CPT | Mod: RT,,, | Performed by: PHYSICIAN ASSISTANT

## 2022-09-01 PROCEDURE — 1160F PR REVIEW ALL MEDS BY PRESCRIBER/CLIN PHARMACIST DOCUMENTED: ICD-10-PCS | Mod: CPTII,,, | Performed by: PHYSICIAN ASSISTANT

## 2022-09-01 PROCEDURE — 99024 PR POST-OP FOLLOW-UP VISIT: ICD-10-PCS | Mod: ,,, | Performed by: PHYSICIAN ASSISTANT

## 2022-09-01 PROCEDURE — 4010F ACE/ARB THERAPY RXD/TAKEN: CPT | Mod: CPTII,,, | Performed by: PHYSICIAN ASSISTANT

## 2022-09-01 PROCEDURE — 1160F RVW MEDS BY RX/DR IN RCRD: CPT | Mod: CPTII,,, | Performed by: PHYSICIAN ASSISTANT

## 2022-09-01 PROCEDURE — 73110 XR WRIST COMPLETE 3 VIEWS RIGHT: ICD-10-PCS | Mod: RT,,, | Performed by: PHYSICIAN ASSISTANT

## 2022-09-01 NOTE — PROGRESS NOTES
Chief Complaint:   Chief Complaint   Patient presents with    Follow-up     r wrist ligament repair on 7/25/22 states he has been having throbbing pain mostly at night, last 2 weeks he was fine all of a sudden pain started          History of present illness:    This is a 47 y.o. year old male who complains of mild wrist pain   Patient has been in the thumb spica cast for 3 and half weeks out.  I states he is having some pain some of the pin air and he is here today for evaluation have looked at earlier to see if he can have it removed    Past Medical History:   Diagnosis Date    ADD (attention deficit disorder)     Hypertension        Past Surgical History:   Procedure Laterality Date    CERVICAL FUSION      KNEE ARTHROSCOPY W/ ACL RECONSTRUCTION Right     left elbow      REPAIR OF LIGAMENT OF WRIST Right 7/25/2022    Procedure: REPAIR, LIGAMENT, WRIST;  Surgeon: Garry Gray MD;  Location: [facility];  Service: Orthopedics;  Laterality: Right;       Current Outpatient Medications   Medication Instructions    dextroamphetamine-amphetamine (ADDERALL) 20 mg tablet 1 tablet, Oral, 2 times daily    febuxostat (ULORIC) 40 mg, Oral, Daily    lisinopriL-hydrochlorothiazide (PRINZIDE,ZESTORETIC) 20-25 mg Tab 1 tablet, Oral, Daily    meloxicam (MOBIC) 15 mg, Oral, Daily    oxyCODONE-acetaminophen (PERCOCET) 5-325 mg per tablet 1 tablet, Oral, Every 4 hours PRN    vitamin D (VITAMIN D3) 1,000 Units, Oral, Daily        Review of patient's allergies indicates:  No Known Allergies    History reviewed. No pertinent family history.        Review of Systems:    Constitution:   Denies chills, fever, and sweats.  HENT:   Denies headaches or blurry vision.  Cardiovascular:  Denies chest pain or irregular heart beat.  Respiratory:   Denies cough or shortness of breath.  Gastrointestinal:  Denies abdominal pain, nausea, or vomiting.  Musculoskeletal:   Denies muscle cramps.  Neurological:   Denies dizziness or focal  weakness.  Psychiatric/Behavior: Normal mental status.  Hematology/Lymph:  Denies bleeding problem or easy bruising/bleeding.  Skin:    Denies rash or suspicious lesions.    Examination:    Vital Signs:    Vitals:    09/01/22 1014   Weight: 115.2 kg (254 lb)   Height: 6' (1.829 m)       Body mass index is 34.45 kg/m².    Constitution:   Well-developed, well nourished patient in no acute distress.  Neurological:   Alert and oriented x 3 and cooperative to examination.     Psychiatric/Behavior: Normal mental status.  Respiratory:   No shortness of breath.  Eyes:    Extraoccular muscles intact  Skin:    No scars, rash or suspicious lesions.    Musculoskeletal Exam :   Cast is removed office today.    Patient does have some mild purulent material around it with some proud flesh.      X-rays taken today show intact pain with well-maintained scapholunate approximation.        Assessment: Tear of right scapholunate ligament, initial encounter  -     X-Ray Wrist Complete Right; Future; Expected date: 09/01/2022      Plan:    Pin is removed in office today and is cleansed with hydrogen peroxide and a Band-Aid is placed.    Patient will wear a thumb spica Exos brace and follow-up in 1 month.    No heavy lifting with the hand he can do fine motor activities and remove the brace for bathing and cleaning of his wound otherwise needs to be on all times           Plans have patient return 1 month         DISCLAIMER: This note may have been dictated using voice recognition software and may contain grammatical errors.     NOTE: Consult report sent to referring provider via [employer].

## 2022-10-04 ENCOUNTER — OFFICE VISIT (OUTPATIENT)
Dept: ORTHOPEDICS | Facility: CLINIC | Age: 47
End: 2022-10-04
Payer: COMMERCIAL

## 2022-10-04 VITALS
HEART RATE: 97 BPM | DIASTOLIC BLOOD PRESSURE: 95 MMHG | BODY MASS INDEX: 35.26 KG/M2 | SYSTOLIC BLOOD PRESSURE: 148 MMHG | WEIGHT: 260 LBS

## 2022-10-04 DIAGNOSIS — S63.8X1A TEAR OF RIGHT SCAPHOLUNATE LIGAMENT, INITIAL ENCOUNTER: Primary | ICD-10-CM

## 2022-10-04 PROCEDURE — 1160F PR REVIEW ALL MEDS BY PRESCRIBER/CLIN PHARMACIST DOCUMENTED: ICD-10-PCS | Mod: CPTII,,, | Performed by: SPECIALIST

## 2022-10-04 PROCEDURE — 3077F SYST BP >= 140 MM HG: CPT | Mod: CPTII,,, | Performed by: SPECIALIST

## 2022-10-04 PROCEDURE — 3008F PR BODY MASS INDEX (BMI) DOCUMENTED: ICD-10-PCS | Mod: CPTII,,, | Performed by: SPECIALIST

## 2022-10-04 PROCEDURE — 3080F DIAST BP >= 90 MM HG: CPT | Mod: CPTII,,, | Performed by: SPECIALIST

## 2022-10-04 PROCEDURE — 99024 PR POST-OP FOLLOW-UP VISIT: ICD-10-PCS | Mod: ,,, | Performed by: SPECIALIST

## 2022-10-04 PROCEDURE — 4010F PR ACE/ARB THEARPY RXD/TAKEN: ICD-10-PCS | Mod: CPTII,,, | Performed by: SPECIALIST

## 2022-10-04 PROCEDURE — 4010F ACE/ARB THERAPY RXD/TAKEN: CPT | Mod: CPTII,,, | Performed by: SPECIALIST

## 2022-10-04 PROCEDURE — 1160F RVW MEDS BY RX/DR IN RCRD: CPT | Mod: CPTII,,, | Performed by: SPECIALIST

## 2022-10-04 PROCEDURE — 3080F PR MOST RECENT DIASTOLIC BLOOD PRESSURE >= 90 MM HG: ICD-10-PCS | Mod: CPTII,,, | Performed by: SPECIALIST

## 2022-10-04 PROCEDURE — 1159F MED LIST DOCD IN RCRD: CPT | Mod: CPTII,,, | Performed by: SPECIALIST

## 2022-10-04 PROCEDURE — 3077F PR MOST RECENT SYSTOLIC BLOOD PRESSURE >= 140 MM HG: ICD-10-PCS | Mod: CPTII,,, | Performed by: SPECIALIST

## 2022-10-04 PROCEDURE — 99024 POSTOP FOLLOW-UP VISIT: CPT | Mod: ,,, | Performed by: SPECIALIST

## 2022-10-04 PROCEDURE — 1159F PR MEDICATION LIST DOCUMENTED IN MEDICAL RECORD: ICD-10-PCS | Mod: CPTII,,, | Performed by: SPECIALIST

## 2022-10-04 PROCEDURE — 3008F BODY MASS INDEX DOCD: CPT | Mod: CPTII,,, | Performed by: SPECIALIST

## 2022-10-04 NOTE — PROGRESS NOTES
Chief Complaint:   Chief Complaint   Patient presents with    Wrist Pain     right wrist pain has improved since last visit. currently wearing brace and having relief but now starting to hurt his thumb. when brace is off very little pain and range of motion is good. taking percocet PRN for pain.          History of present illness:    This is a 47 y.o. year old male who complains of minimal pain in his right wrist.  He states he continues hand well.  He feels like his motion is greatly improved.      Past Medical History:   Diagnosis Date    ADD (attention deficit disorder)     Hypertension        Past Surgical History:   Procedure Laterality Date    CERVICAL FUSION      KNEE ARTHROSCOPY W/ ACL RECONSTRUCTION Right     left elbow      REPAIR OF LIGAMENT OF WRIST Right 7/25/2022    Procedure: REPAIR, LIGAMENT, WRIST;  Surgeon: Garry Gray MD;  [facility];  Service: Orthopedics;  Laterality: Right;       Current Outpatient Medications   Medication Instructions    dextroamphetamine-amphetamine (ADDERALL) 20 mg tablet 1 tablet, Oral, 2 times daily    febuxostat (ULORIC) 40 mg, Oral, Daily    lisinopriL-hydrochlorothiazide (PRINZIDE,ZESTORETIC) 20-25 mg Tab 1 tablet, Oral, Daily    meloxicam (MOBIC) 15 mg, Oral, Daily    oxyCODONE-acetaminophen (PERCOCET) 5-325 mg per tablet 1 tablet, Oral, Every 4 hours PRN    vitamin D (VITAMIN D3) 1,000 Units, Oral, Daily        Review of patient's allergies indicates:  No Known Allergies    History reviewed. No pertinent family history.        Review of Systems:    Constitution:   Denies chills, fever, and sweats.  HENT:   Denies headaches or blurry vision.  Cardiovascular:  Denies chest pain or irregular heart beat.  Respiratory:   Denies cough or shortness of breath.  Gastrointestinal:  Denies abdominal pain, nausea, or vomiting.  Musculoskeletal:   Denies muscle cramps.  Neurological:   Denies dizziness or focal weakness.  Psychiatric/Behavior: Normal mental  status.  Hematology/Lymph:  Denies bleeding problem or easy bruising/bleeding.  Skin:    Denies rash or suspicious lesions.    Examination:    Vital Signs:    Vitals:    10/04/22 0947   BP: (!) 148/95   Pulse: 97   Weight: 117.9 kg (260 lb)       Body mass index is 35.26 kg/m².    Constitution:   Well-developed, well nourished patient in no acute distress.  Neurological:   Alert and oriented x 3 and cooperative to examination.     Psychiatric/Behavior: Normal mental status.  Respiratory:   No shortness of breath.  Eyes:    Extraoccular muscles intact  Skin:    No scars, rash or suspicious lesions.    Musculoskeletal Exam :      This patient's wrist on the right his dorsiflexion of 50° volar flexion of 40° is good radial deviation is supination patient has increased  strength is improving.  Assessment: There are no diagnoses linked to this encounter.    Plan:   Plan is having to return in a month for follow-up visit need to have repeat x-ray in 2 3 months.      DISCLAIMER: This note may have been dictated using voice recognition software and may contain grammatical errors.     NOTE: Consult report sent to referring provider via "[employer]

## 2022-10-10 DIAGNOSIS — S63.8X1A TEAR OF RIGHT SCAPHOLUNATE LIGAMENT, INITIAL ENCOUNTER: Primary | ICD-10-CM

## 2022-10-10 DIAGNOSIS — M25.331 SCAPHO-LUNATE DISSOCIATION, RIGHT: ICD-10-CM

## 2022-11-15 DIAGNOSIS — R31.9 HEMATURIA: Primary | ICD-10-CM

## 2022-11-17 ENCOUNTER — HOSPITAL ENCOUNTER (OUTPATIENT)
Dept: RADIOLOGY | Facility: HOSPITAL | Age: 47
Discharge: HOME | End: 2022-11-17
Attending: INTERNAL MEDICINE
Payer: COMMERCIAL

## 2022-11-17 DIAGNOSIS — R31.9 HEMATURIA: ICD-10-CM

## 2022-11-17 PROCEDURE — 76770 US EXAM ABDO BACK WALL COMP: CPT | Mod: TC

## 2023-05-05 ENCOUNTER — OFFICE VISIT (OUTPATIENT)
Dept: URGENT CARE | Facility: CLINIC | Age: 48
End: 2023-05-05
Payer: COMMERCIAL

## 2023-05-05 VITALS
RESPIRATION RATE: 20 BRPM | HEIGHT: 72 IN | BODY MASS INDEX: 35.21 KG/M2 | SYSTOLIC BLOOD PRESSURE: 135 MMHG | TEMPERATURE: 99 F | HEART RATE: 104 BPM | DIASTOLIC BLOOD PRESSURE: 88 MMHG | WEIGHT: 260 LBS | OXYGEN SATURATION: 95 %

## 2023-05-05 DIAGNOSIS — S61.210A LACERATION OF RIGHT INDEX FINGER WITHOUT FOREIGN BODY WITHOUT DAMAGE TO NAIL, INITIAL ENCOUNTER: ICD-10-CM

## 2023-05-05 PROCEDURE — 12001 RPR S/N/AX/GEN/TRNK 2.5CM/<: CPT | Mod: ,,, | Performed by: NURSE PRACTITIONER

## 2023-05-05 PROCEDURE — 90471 TDAP VACCINE GREATER THAN OR EQUAL TO 7YO IM: ICD-10-PCS | Mod: ,,, | Performed by: NURSE PRACTITIONER

## 2023-05-05 PROCEDURE — 12001 LACERATION REPAIR: ICD-10-PCS | Mod: ,,, | Performed by: NURSE PRACTITIONER

## 2023-05-05 PROCEDURE — 90715 TDAP VACCINE 7 YRS/> IM: CPT | Mod: ,,, | Performed by: NURSE PRACTITIONER

## 2023-05-05 PROCEDURE — 99203 OFFICE O/P NEW LOW 30 MIN: CPT | Mod: 25,,, | Performed by: NURSE PRACTITIONER

## 2023-05-05 PROCEDURE — 90715 TDAP VACCINE GREATER THAN OR EQUAL TO 7YO IM: ICD-10-PCS | Mod: ,,, | Performed by: NURSE PRACTITIONER

## 2023-05-05 PROCEDURE — 99203 PR OFFICE/OUTPT VISIT, NEW, LEVL III, 30-44 MIN: ICD-10-PCS | Mod: 25,,, | Performed by: NURSE PRACTITIONER

## 2023-05-05 PROCEDURE — 90471 IMMUNIZATION ADMIN: CPT | Mod: ,,, | Performed by: NURSE PRACTITIONER

## 2023-05-05 RX ORDER — CEPHALEXIN 500 MG/1
500 CAPSULE ORAL EVERY 6 HOURS
Qty: 40 CAPSULE | Refills: 0 | Status: SHIPPED | OUTPATIENT
Start: 2023-05-05 | End: 2023-05-15

## 2023-05-05 NOTE — PROCEDURES
"Laceration Repair    Date/Time: 2023 2:25 PM  Performed by: Pablito Sheldon NP  Authorized by: Pablito Sheldon NP   Consent Done: Yes  Consent: Verbal consent obtained. Written consent obtained.  Consent given by: patient  Patient understanding: patient states understanding of the procedure being performed  Patient consent: the patient's understanding of the procedure matches consent given  Patient identity confirmed:   Time out: Immediately prior to procedure a "time out" was called to verify the correct patient, procedure, equipment, support staff and site/side marked as required.  Body area: upper extremity  Location details: right index finger  Laceration length: 1 cm  Tendon involvement: none  Nerve involvement: none  Vascular damage: no    Patient sedated: no  Irrigation solution: saline  Irrigation method: syringe  Amount of cleaning: standard  Debridement: none  Degree of undermining: none  Skin closure: glue  Patient tolerance: Patient tolerated the procedure well with no immediate complications      "

## 2023-05-05 NOTE — PROGRESS NOTES
Subjective:      Patient ID: Amadeo Jha is a 47 y.o. male.    Vitals:  height is 6' (1.829 m) and weight is 117.9 kg (260 lb). His oral temperature is 98.5 °F (36.9 °C). His blood pressure is 135/88 and his pulse is 104. His respiration is 20 and oxygen saturation is 95%.     Chief Complaint: Laceration (Laceration to second finger on right hand from meat saw x 2 hours. Last Tdap unknown. )    47-year-old male presents with incision wound to the right 2nd distal fingertip.  Accidentally cut with a saw while cutting meat at work.  Just prior to arrival.  Good range of motion no active bleeding  ROS   Objective:     Physical Exam   Abdominal: Normal appearance.   Musculoskeletal: Normal range of motion.         General: Signs of injury (right 2nd finger incision wound) present. Normal range of motion.        Hands:    Neurological: He is alert.   Skin: Skin is warm and dry.   Nursing note and vitals reviewed.    Assessment:     1. Laceration of right index finger without foreign body without damage to nail, initial encounter        Plan:   See Dermabond discharge instruction sheet  Return here or follow-up with your primary care provider for concerns    Laceration of right index finger without foreign body without damage to nail, initial encounter  -     Laceration Repair  -     (In Office Administered) Tdap Vaccine  -     cephALEXin (KEFLEX) 500 MG capsule; Take 1 capsule (500 mg total) by mouth every 6 (six) hours. for 10 days  Dispense: 40 capsule; Refill: 0

## 2023-05-05 NOTE — PATIENT INSTRUCTIONS
See Dermabond discharge instruction sheet  Keep area clean and dry  Return here or follow-up with your primary care provider for concerns

## 2023-05-18 ENCOUNTER — LAB VISIT (OUTPATIENT)
Dept: LAB | Facility: HOSPITAL | Age: 48
End: 2023-05-18
Attending: INTERNAL MEDICINE
Payer: COMMERCIAL

## 2023-05-18 DIAGNOSIS — I11.9 MALIGNANT HYPERTENSIVE HEART DISEASE WITHOUT HEART FAILURE: Primary | ICD-10-CM

## 2023-05-18 DIAGNOSIS — K21.9 GASTROESOPHAGEAL REFLUX DISEASE, UNSPECIFIED WHETHER ESOPHAGITIS PRESENT: ICD-10-CM

## 2023-05-18 DIAGNOSIS — M10.9 GOUT, UNSPECIFIED CAUSE, UNSPECIFIED CHRONICITY, UNSPECIFIED SITE: ICD-10-CM

## 2023-05-18 DIAGNOSIS — E55.9 AVITAMINOSIS D: ICD-10-CM

## 2023-05-18 DIAGNOSIS — E78.2 MIXED HYPERLIPIDEMIA: ICD-10-CM

## 2023-05-18 LAB
ALBUMIN SERPL-MCNC: 4.3 G/DL (ref 3.5–5)
ALBUMIN/GLOB SERPL: 1.3 RATIO (ref 1.1–2)
ALP SERPL-CCNC: 36 UNIT/L (ref 40–150)
ALT SERPL-CCNC: 59 UNIT/L (ref 0–55)
AST SERPL-CCNC: 31 UNIT/L (ref 5–34)
BASOPHILS # BLD AUTO: 0.02 X10(3)/MCL
BASOPHILS NFR BLD AUTO: 0.3 %
BILIRUBIN DIRECT+TOT PNL SERPL-MCNC: 1.3 MG/DL
BUN SERPL-MCNC: 16.3 MG/DL (ref 8.9–20.6)
CALCIUM SERPL-MCNC: 9.2 MG/DL (ref 8.4–10.2)
CHLORIDE SERPL-SCNC: 104 MMOL/L (ref 98–107)
CHOLEST SERPL-MCNC: 165 MG/DL
CHOLEST/HDLC SERPL: 4 {RATIO} (ref 0–5)
CO2 SERPL-SCNC: 28 MMOL/L (ref 22–29)
CREAT SERPL-MCNC: 1.13 MG/DL (ref 0.73–1.18)
DEPRECATED CALCIDIOL+CALCIFEROL SERPL-MC: 79.8 NG/ML (ref 30–80)
EOSINOPHIL # BLD AUTO: 0.04 X10(3)/MCL (ref 0–0.9)
EOSINOPHIL NFR BLD AUTO: 0.5 %
ERYTHROCYTE [DISTWIDTH] IN BLOOD BY AUTOMATED COUNT: 12.6 % (ref 11.5–17)
GFR SERPLBLD CREATININE-BSD FMLA CKD-EPI: >60 MLS/MIN/1.73/M2
GLOBULIN SER-MCNC: 3.2 GM/DL (ref 2.4–3.5)
GLUCOSE SERPL-MCNC: 99 MG/DL (ref 74–100)
HCT VFR BLD AUTO: 55.2 % (ref 42–52)
HDLC SERPL-MCNC: 40 MG/DL (ref 35–60)
HGB BLD-MCNC: 17.9 G/DL (ref 14–18)
IMM GRANULOCYTES # BLD AUTO: 0 X10(3)/MCL (ref 0–0.04)
IMM GRANULOCYTES NFR BLD AUTO: 0 %
LDLC SERPL CALC-MCNC: 103 MG/DL (ref 50–140)
LYMPHOCYTES # BLD AUTO: 1.64 X10(3)/MCL (ref 0.6–4.6)
LYMPHOCYTES NFR BLD AUTO: 22.5 %
MCH RBC QN AUTO: 28.7 PG (ref 27–31)
MCHC RBC AUTO-ENTMCNC: 32.4 G/DL (ref 33–36)
MCV RBC AUTO: 88.5 FL (ref 80–94)
MONOCYTES # BLD AUTO: 0.45 X10(3)/MCL (ref 0.1–1.3)
MONOCYTES NFR BLD AUTO: 6.2 %
NEUTROPHILS # BLD AUTO: 5.15 X10(3)/MCL (ref 2.1–9.2)
NEUTROPHILS NFR BLD AUTO: 70.5 %
PLATELET # BLD AUTO: 225 X10(3)/MCL (ref 130–400)
PMV BLD AUTO: 9.4 FL (ref 7.4–10.4)
POTASSIUM SERPL-SCNC: 3.4 MMOL/L (ref 3.5–5.1)
PROT SERPL-MCNC: 7.5 GM/DL (ref 6.4–8.3)
RBC # BLD AUTO: 6.24 X10(6)/MCL (ref 4.7–6.1)
SODIUM SERPL-SCNC: 142 MMOL/L (ref 136–145)
TRIGL SERPL-MCNC: 110 MG/DL (ref 34–140)
URATE SERPL-MCNC: 7.8 MG/DL (ref 3.5–7.2)
VLDLC SERPL CALC-MCNC: 22 MG/DL
WBC # SPEC AUTO: 7.3 X10(3)/MCL (ref 4.5–11.5)

## 2023-05-18 PROCEDURE — 85025 COMPLETE CBC W/AUTO DIFF WBC: CPT

## 2023-05-18 PROCEDURE — 36415 COLL VENOUS BLD VENIPUNCTURE: CPT

## 2023-05-18 PROCEDURE — 84550 ASSAY OF BLOOD/URIC ACID: CPT

## 2023-05-18 PROCEDURE — 80053 COMPREHEN METABOLIC PANEL: CPT

## 2023-05-18 PROCEDURE — 80061 LIPID PANEL: CPT

## 2023-05-18 PROCEDURE — 82043 UR ALBUMIN QUANTITATIVE: CPT

## 2023-05-18 PROCEDURE — 82306 VITAMIN D 25 HYDROXY: CPT

## 2023-05-19 LAB
CREAT UR-MCNC: 104.9 MG/DL (ref 63–166)
MICROALBUMIN UR-MCNC: 5.1 UG/ML
MICROALBUMIN/CREAT RATIO PNL UR: 4.9 MG/GM CR (ref 0–30)

## 2023-07-24 DIAGNOSIS — R13.10 DYSPHAGIA: Primary | ICD-10-CM

## 2023-08-10 ENCOUNTER — HOSPITAL ENCOUNTER (OUTPATIENT)
Dept: RADIOLOGY | Facility: HOSPITAL | Age: 48
Discharge: HOME | End: 2023-08-10
Attending: INTERNAL MEDICINE
Payer: COMMERCIAL

## 2023-08-10 DIAGNOSIS — R13.10 DYSPHAGIA: ICD-10-CM

## 2023-08-10 PROCEDURE — 25500020 PHARM REV CODE 255

## 2023-08-10 PROCEDURE — 74220 X-RAY XM ESOPHAGUS 1CNTRST: CPT | Mod: TC

## 2023-08-10 PROCEDURE — A9698 NON-RAD CONTRAST MATERIALNOC: HCPCS

## 2023-08-10 RX ADMIN — BARIUM SULFATE 137 ML: 980 POWDER, FOR SUSPENSION ORAL at 09:08

## 2023-08-10 RX ADMIN — Medication 176 G: at 09:08

## 2023-08-31 DIAGNOSIS — M54.16 LUMBAR RADICULOPATHY: Primary | ICD-10-CM

## 2023-09-01 ENCOUNTER — HOSPITAL ENCOUNTER (OUTPATIENT)
Dept: RADIOLOGY | Facility: HOSPITAL | Age: 48
Discharge: HOME | End: 2023-09-01
Attending: PHYSICIAN ASSISTANT
Payer: COMMERCIAL

## 2023-09-01 DIAGNOSIS — M54.16 LUMBAR RADICULOPATHY: ICD-10-CM

## 2023-09-01 PROCEDURE — 72148 MRI LUMBAR SPINE W/O DYE: CPT | Mod: TC

## 2023-09-22 ENCOUNTER — LAB VISIT (OUTPATIENT)
Dept: LAB | Facility: HOSPITAL | Age: 48
End: 2023-09-22
Attending: INTERNAL MEDICINE
Payer: COMMERCIAL

## 2023-09-22 DIAGNOSIS — R74.01 NONSPECIFIC ELEVATION OF LEVELS OF TRANSAMINASE OR LACTIC ACID DEHYDROGENASE (LDH): Primary | ICD-10-CM

## 2023-09-22 DIAGNOSIS — R74.02 NONSPECIFIC ELEVATION OF LEVELS OF TRANSAMINASE OR LACTIC ACID DEHYDROGENASE (LDH): Primary | ICD-10-CM

## 2023-09-22 LAB
ALBUMIN SERPL-MCNC: 4.3 G/DL (ref 3.5–5)
ALP SERPL-CCNC: 45 UNIT/L (ref 40–150)
ALT SERPL-CCNC: 82 UNIT/L (ref 0–55)
AST SERPL-CCNC: 38 UNIT/L (ref 5–34)
BILIRUB SERPL-MCNC: 0.5 MG/DL
BILIRUBIN DIRECT+TOT PNL SERPL-MCNC: 0.2 MG/DL (ref 0–?)
BILIRUBIN DIRECT+TOT PNL SERPL-MCNC: 0.3 MG/DL (ref 0–0.8)
PROT SERPL-MCNC: 7.5 GM/DL (ref 6.4–8.3)

## 2023-09-22 PROCEDURE — 36415 COLL VENOUS BLD VENIPUNCTURE: CPT

## 2023-09-22 PROCEDURE — 80076 HEPATIC FUNCTION PANEL: CPT

## 2023-09-25 ENCOUNTER — HOSPITAL ENCOUNTER (OUTPATIENT)
Facility: HOSPITAL | Age: 48
End: 2023-09-25
Attending: OTOLARYNGOLOGY | Admitting: OTOLARYNGOLOGY
Payer: COMMERCIAL

## 2023-09-25 DIAGNOSIS — R74.01 ELEVATED LIVER TRANSAMINASE LEVEL: Primary | ICD-10-CM

## 2023-09-27 ENCOUNTER — HOSPITAL ENCOUNTER (OUTPATIENT)
Dept: RADIOLOGY | Facility: HOSPITAL | Age: 48
Discharge: HOME | End: 2023-09-27
Attending: INTERNAL MEDICINE
Payer: COMMERCIAL

## 2023-09-27 DIAGNOSIS — R74.01 ELEVATED LIVER TRANSAMINASE LEVEL: ICD-10-CM

## 2023-09-27 PROCEDURE — 76705 ECHO EXAM OF ABDOMEN: CPT | Mod: TC

## 2023-09-28 DIAGNOSIS — R74.01 ELEVATED TRANSAMINASE LEVEL: Primary | ICD-10-CM

## 2023-10-06 ENCOUNTER — LAB VISIT (OUTPATIENT)
Dept: LAB | Facility: HOSPITAL | Age: 48
End: 2023-10-06
Attending: INTERNAL MEDICINE
Payer: COMMERCIAL

## 2023-10-06 DIAGNOSIS — R74.02 NONSPECIFIC ELEVATION OF LEVELS OF TRANSAMINASE OR LACTIC ACID DEHYDROGENASE (LDH): Primary | ICD-10-CM

## 2023-10-06 DIAGNOSIS — R74.01 NONSPECIFIC ELEVATION OF LEVELS OF TRANSAMINASE OR LACTIC ACID DEHYDROGENASE (LDH): Primary | ICD-10-CM

## 2023-10-06 LAB
ALBUMIN SERPL-MCNC: 4.3 G/DL (ref 3.5–5)
ALBUMIN/GLOB SERPL: 1.5 RATIO (ref 1.1–2)
ALP SERPL-CCNC: 35 UNIT/L (ref 40–150)
ALT SERPL-CCNC: 55 UNIT/L (ref 0–55)
AST SERPL-CCNC: 31 UNIT/L (ref 5–34)
BILIRUB SERPL-MCNC: 0.8 MG/DL
BUN SERPL-MCNC: 15.1 MG/DL (ref 8.9–20.6)
CALCIUM SERPL-MCNC: 9.4 MG/DL (ref 8.4–10.2)
CHLORIDE SERPL-SCNC: 102 MMOL/L (ref 98–107)
CO2 SERPL-SCNC: 28 MMOL/L (ref 22–29)
CREAT SERPL-MCNC: 0.96 MG/DL (ref 0.73–1.18)
GFR SERPLBLD CREATININE-BSD FMLA CKD-EPI: >60 MLS/MIN/1.73/M2
GGT SERPL-CCNC: 37 U/L (ref 12–64)
GLOBULIN SER-MCNC: 2.8 GM/DL (ref 2.4–3.5)
GLUCOSE SERPL-MCNC: 99 MG/DL (ref 74–100)
HAV IGM SERPL QL IA: NONREACTIVE
HBV CORE IGM SERPL QL IA: NONREACTIVE
HBV SURFACE AG SERPL QL IA: NONREACTIVE
HCV AB SERPL QL IA: NONREACTIVE
POTASSIUM SERPL-SCNC: 4 MMOL/L (ref 3.5–5.1)
PROT SERPL-MCNC: 7.1 GM/DL (ref 6.4–8.3)
SODIUM SERPL-SCNC: 139 MMOL/L (ref 136–145)

## 2023-10-06 PROCEDURE — 80074 ACUTE HEPATITIS PANEL: CPT

## 2023-10-06 PROCEDURE — 80053 COMPREHEN METABOLIC PANEL: CPT

## 2023-10-06 PROCEDURE — 82977 ASSAY OF GGT: CPT

## 2023-10-06 PROCEDURE — 36415 COLL VENOUS BLD VENIPUNCTURE: CPT

## 2023-10-16 NOTE — DISCHARGE INSTRUCTIONS
Patient Education       Septoplasty Discharge Instructions      What care is needed at home?   Avoid lying face down for the first few days after the surgery.  Sleep in a recliner or in a reclined position for the first few nights.  Breathe through your mouth for the first few days.  Place an ice pack wrapped in a towel over the painful part. Never put ice right on the skin. Do not leave the ice on more than 10 to 15 minutes at a time. This will help relieve pain and swelling.  Do not blow, touch, or rub your nose. This can cause more swelling and bleeding. Try not to sneeze.  Protect your nose from injury.  Avoid activities that put pressure on your face, like bending over or holding your breath.  Wound care: Take the packing out tomorrow.  You can start taking a shower tomorrow.  Ask the doctor when you can return to your normal activities or return to work.  What follow-up care is needed?   Be sure to keep the follow up visit.   Will physical activity be limited?   Talk with your doctor about the right amount of activity for you.  Do not lift anything heavy for 2 to 4 weeks.   Do not stay in the sun for more than 15 minutes. This will help to avoid getting sunburn on your nose.  What problems could happen?   Infection  Bleeding  Numbness in the nose  Hole in nasal septum  Scarring in nose that that causes blockage  Changes in taste or smell  When do I need to call the doctor?   Signs of infection. These include a fever of 100.4°F (38°C) or higher, chills.  Signs of wound infection. These include swelling, redness, warmth around the wound; too much pain when touched; yellowish, greenish, or bloody discharge; foul smell coming from the cut site; cut site opens up.  Lots of bleeding  Packing comes out before it should  Upset stomach and throwing up not helped with drugs  Too much pain  Cough, shortness of breath, chest pain  Health problem is not better or you are feeling worse

## 2023-10-22 VITALS — BODY MASS INDEX: 33.86 KG/M2 | WEIGHT: 250 LBS | HEIGHT: 72 IN

## 2023-10-24 ENCOUNTER — HOSPITAL ENCOUNTER (OUTPATIENT)
Dept: RADIOLOGY | Facility: HOSPITAL | Age: 48
Discharge: HOME | End: 2023-10-24
Attending: OTOLARYNGOLOGY
Payer: COMMERCIAL

## 2023-10-24 DIAGNOSIS — Z01.818 OTHER SPECIFIED PRE-OPERATIVE EXAMINATION: ICD-10-CM

## 2023-10-24 DIAGNOSIS — Z79.01 LONG TERM (CURRENT) USE OF ANTICOAGULANTS: ICD-10-CM

## 2023-10-24 DIAGNOSIS — Z01.818 OTHER SPECIFIED PRE-OPERATIVE EXAMINATION: Primary | ICD-10-CM

## 2023-10-24 PROCEDURE — 71046 X-RAY EXAM CHEST 2 VIEWS: CPT | Mod: TC

## 2023-10-30 ENCOUNTER — PATIENT MESSAGE (OUTPATIENT)
Dept: ADMINISTRATIVE | Facility: OTHER | Age: 48
End: 2023-10-30
Payer: COMMERCIAL

## 2023-10-30 RX ORDER — GABAPENTIN 300 MG/1
600 CAPSULE ORAL
Status: CANCELLED | OUTPATIENT
Start: 2023-10-30 | End: 2023-10-30

## 2023-10-30 RX ORDER — SODIUM CHLORIDE, SODIUM LACTATE, POTASSIUM CHLORIDE, CALCIUM CHLORIDE 600; 310; 30; 20 MG/100ML; MG/100ML; MG/100ML; MG/100ML
INJECTION, SOLUTION INTRAVENOUS CONTINUOUS
Status: CANCELLED | OUTPATIENT
Start: 2023-10-30

## 2023-10-30 RX ORDER — ACETAMINOPHEN 500 MG
1000 TABLET ORAL
Status: CANCELLED | OUTPATIENT
Start: 2023-10-30 | End: 2023-10-30

## 2023-10-30 RX ORDER — ONDANSETRON 2 MG/ML
4 INJECTION INTRAMUSCULAR; INTRAVENOUS
Status: CANCELLED | OUTPATIENT
Start: 2023-10-30 | End: 2023-10-30

## 2023-10-30 RX ORDER — HYDROMORPHONE HYDROCHLORIDE 2 MG/ML
0.4 INJECTION, SOLUTION INTRAMUSCULAR; INTRAVENOUS; SUBCUTANEOUS EVERY 5 MIN PRN
Status: CANCELLED | OUTPATIENT
Start: 2023-10-30

## 2023-10-30 RX ORDER — LIDOCAINE HYDROCHLORIDE 10 MG/ML
1 INJECTION, SOLUTION EPIDURAL; INFILTRATION; INTRACAUDAL; PERINEURAL ONCE
Status: CANCELLED | OUTPATIENT
Start: 2023-10-30 | End: 2023-10-30

## 2023-10-30 RX ORDER — OXYMETAZOLINE HCL 0.05 %
2 SPRAY, NON-AEROSOL (ML) NASAL
Status: CANCELLED | OUTPATIENT
Start: 2023-10-30 | End: 2023-10-30

## 2023-10-30 RX ORDER — MIDAZOLAM HYDROCHLORIDE 1 MG/ML
2 INJECTION INTRAMUSCULAR; INTRAVENOUS ONCE AS NEEDED
Status: CANCELLED | OUTPATIENT
Start: 2023-10-30 | End: 2035-03-28

## 2023-10-30 RX ORDER — ONDANSETRON 2 MG/ML
4 INJECTION INTRAMUSCULAR; INTRAVENOUS ONCE AS NEEDED
Status: CANCELLED | OUTPATIENT
Start: 2023-10-30 | End: 2035-03-28

## 2023-11-16 ENCOUNTER — LAB VISIT (OUTPATIENT)
Dept: LAB | Facility: HOSPITAL | Age: 48
End: 2023-11-16
Attending: INTERNAL MEDICINE
Payer: COMMERCIAL

## 2023-11-16 DIAGNOSIS — E78.2 MIXED HYPERLIPIDEMIA: ICD-10-CM

## 2023-11-16 DIAGNOSIS — I11.9 BENIGN HYPERTENSIVE HEART DISEASE WITHOUT HEART FAILURE: Primary | ICD-10-CM

## 2023-11-16 DIAGNOSIS — E55.9 VITAMIN D DEFICIENCY DISEASE: ICD-10-CM

## 2023-11-16 DIAGNOSIS — K21.9 GASTROESOPHAGEAL REFLUX DISEASE, UNSPECIFIED WHETHER ESOPHAGITIS PRESENT: ICD-10-CM

## 2023-11-16 DIAGNOSIS — M10.9 GOUT, UNSPECIFIED CAUSE, UNSPECIFIED CHRONICITY, UNSPECIFIED SITE: ICD-10-CM

## 2023-11-16 LAB
ALBUMIN SERPL-MCNC: 4 G/DL (ref 3.5–5)
ALBUMIN/GLOB SERPL: 1.3 RATIO (ref 1.1–2)
ALP SERPL-CCNC: 39 UNIT/L (ref 40–150)
ALT SERPL-CCNC: 58 UNIT/L (ref 0–55)
AST SERPL-CCNC: 31 UNIT/L (ref 5–34)
BASOPHILS # BLD AUTO: 0.03 X10(3)/MCL
BASOPHILS NFR BLD AUTO: 0.4 %
BILIRUB SERPL-MCNC: 0.7 MG/DL
BUN SERPL-MCNC: 13.6 MG/DL (ref 8.9–20.6)
CALCIUM SERPL-MCNC: 9.1 MG/DL (ref 8.4–10.2)
CHLORIDE SERPL-SCNC: 104 MMOL/L (ref 98–107)
CHOLEST SERPL-MCNC: 159 MG/DL
CHOLEST/HDLC SERPL: 4 {RATIO} (ref 0–5)
CO2 SERPL-SCNC: 29 MMOL/L (ref 22–29)
CREAT SERPL-MCNC: 1.04 MG/DL (ref 0.73–1.18)
CREAT UR-MCNC: 207.8 MG/DL (ref 63–166)
DEPRECATED CALCIDIOL+CALCIFEROL SERPL-MC: 48.6 NG/ML (ref 30–80)
EOSINOPHIL # BLD AUTO: 0.02 X10(3)/MCL (ref 0–0.9)
EOSINOPHIL NFR BLD AUTO: 0.3 %
ERYTHROCYTE [DISTWIDTH] IN BLOOD BY AUTOMATED COUNT: 12.6 % (ref 11.5–17)
EST. AVERAGE GLUCOSE BLD GHB EST-MCNC: 111.2 MG/DL
GFR SERPLBLD CREATININE-BSD FMLA CKD-EPI: >60 MLS/MIN/1.73/M2
GLOBULIN SER-MCNC: 3.2 GM/DL (ref 2.4–3.5)
GLUCOSE SERPL-MCNC: 83 MG/DL (ref 74–100)
HBA1C MFR BLD: 5.5 %
HCT VFR BLD AUTO: 52.3 % (ref 42–52)
HDLC SERPL-MCNC: 44 MG/DL (ref 35–60)
HGB BLD-MCNC: 16.8 G/DL (ref 14–18)
IMM GRANULOCYTES # BLD AUTO: 0.06 X10(3)/MCL (ref 0–0.04)
IMM GRANULOCYTES NFR BLD AUTO: 0.8 %
LDLC SERPL CALC-MCNC: 93 MG/DL (ref 50–140)
LYMPHOCYTES # BLD AUTO: 2.64 X10(3)/MCL (ref 0.6–4.6)
LYMPHOCYTES NFR BLD AUTO: 35.8 %
MCH RBC QN AUTO: 29.2 PG (ref 27–31)
MCHC RBC AUTO-ENTMCNC: 32.1 G/DL (ref 33–36)
MCV RBC AUTO: 91 FL (ref 80–94)
MICROALBUMIN UR-MCNC: 7.5 UG/ML
MICROALBUMIN/CREAT RATIO PNL UR: 3.6 MG/GM CR (ref 0–30)
MONOCYTES # BLD AUTO: 0.67 X10(3)/MCL (ref 0.1–1.3)
MONOCYTES NFR BLD AUTO: 9.1 %
NEUTROPHILS # BLD AUTO: 3.96 X10(3)/MCL (ref 2.1–9.2)
NEUTROPHILS NFR BLD AUTO: 53.6 %
PLATELET # BLD AUTO: 216 X10(3)/MCL (ref 130–400)
PMV BLD AUTO: 9.7 FL (ref 7.4–10.4)
POTASSIUM SERPL-SCNC: 3.9 MMOL/L (ref 3.5–5.1)
PROT SERPL-MCNC: 7.2 GM/DL (ref 6.4–8.3)
RBC # BLD AUTO: 5.75 X10(6)/MCL (ref 4.7–6.1)
SODIUM SERPL-SCNC: 142 MMOL/L (ref 136–145)
TRIGL SERPL-MCNC: 112 MG/DL (ref 34–140)
URATE SERPL-MCNC: 4.5 MG/DL (ref 3.5–7.2)
VLDLC SERPL CALC-MCNC: 22 MG/DL
WBC # SPEC AUTO: 7.38 X10(3)/MCL (ref 4.5–11.5)

## 2023-11-16 PROCEDURE — 36415 COLL VENOUS BLD VENIPUNCTURE: CPT

## 2023-11-16 PROCEDURE — 80061 LIPID PANEL: CPT

## 2023-11-16 PROCEDURE — 80053 COMPREHEN METABOLIC PANEL: CPT

## 2023-11-16 PROCEDURE — 82306 VITAMIN D 25 HYDROXY: CPT

## 2023-11-16 PROCEDURE — 84550 ASSAY OF BLOOD/URIC ACID: CPT

## 2023-11-16 PROCEDURE — 83036 HEMOGLOBIN GLYCOSYLATED A1C: CPT

## 2023-11-16 PROCEDURE — 82043 UR ALBUMIN QUANTITATIVE: CPT

## 2023-11-16 PROCEDURE — 85025 COMPLETE CBC W/AUTO DIFF WBC: CPT

## 2023-12-05 NOTE — DISCHARGE INSTRUCTIONS
Patient Education  Septoplasty Discharge Instructions      What care is needed at home?   Avoid lying face down for the first few days after the surgery.  Sleep in a recliner or in a reclined position for the first few nights.  Breathe through your mouth for the first few days.  Place an ice pack wrapped in a towel over the painful part. Never put ice right on the skin. Do not leave the ice on more than 10 to 15 minutes at a time. This will help relieve pain and swelling.  Do not blow, touch, or rub your nose. This can cause more swelling and bleeding. Try not to sneeze.  Protect your nose from injury.  Avoid activities that put pressure on your face, like bending over or holding your breath.  Wound care as instructed by your doctor.  You can start taking a shower as instructed by your doctor.   Ask the doctor when you can return to your normal activities or return to work.  What follow-up care is needed?   Be sure to keep the follow up visit.   Will physical activity be limited?   Talk with your doctor about the right amount of activity for you.  Do not lift anything heavy for 2 to 4 weeks.   Do not stay in the sun for more than 15 minutes. This will help to avoid getting sunburn on your nose.  What problems could happen?   Infection  Bleeding  Numbness in the nose  Hole in nasal septum  Scarring in nose that that causes blockage  Changes in taste or smell  When do I need to call the doctor?   Signs of infection. These include a fever of 100.4°F (38°C) or higher, chills.  Signs of wound infection. These include swelling, redness, warmth around the wound; too much pain when touched; yellowish, greenish, or bloody discharge; foul smell coming from the cut site; cut site opens up.  Lots of bleeding  Packing comes out before it should  Upset stomach and throwing up not helped with drugs  Too much pain  Cough, shortness of breath, chest pain  Health problem is not better or you are feeling worse

## 2023-12-19 ENCOUNTER — HOSPITAL ENCOUNTER (OUTPATIENT)
Facility: HOSPITAL | Age: 48
Discharge: HOME | End: 2023-12-19
Attending: OTOLARYNGOLOGY | Admitting: OTOLARYNGOLOGY
Payer: COMMERCIAL

## 2023-12-19 ENCOUNTER — ANESTHESIA (OUTPATIENT)
Dept: SURGERY | Facility: HOSPITAL | Age: 48
End: 2023-12-19
Payer: COMMERCIAL

## 2023-12-19 ENCOUNTER — ANESTHESIA EVENT (OUTPATIENT)
Dept: SURGERY | Facility: HOSPITAL | Age: 48
End: 2023-12-19
Payer: COMMERCIAL

## 2023-12-19 DIAGNOSIS — J34.2 DEVIATED NASAL SEPTUM: ICD-10-CM

## 2023-12-19 PROCEDURE — D9220A PRA ANESTHESIA: Mod: ANES,,, | Performed by: ANESTHESIOLOGY

## 2023-12-19 PROCEDURE — 63600175 PHARM REV CODE 636 W HCPCS: Performed by: NURSE ANESTHETIST, CERTIFIED REGISTERED

## 2023-12-19 PROCEDURE — D9220A PRA ANESTHESIA: ICD-10-PCS | Mod: ANES,,, | Performed by: ANESTHESIOLOGY

## 2023-12-19 PROCEDURE — 63600175 PHARM REV CODE 636 W HCPCS: Performed by: ANESTHESIOLOGY

## 2023-12-19 PROCEDURE — 36000707: Performed by: OTOLARYNGOLOGY

## 2023-12-19 PROCEDURE — 25000003 PHARM REV CODE 250: Performed by: NURSE ANESTHETIST, CERTIFIED REGISTERED

## 2023-12-19 PROCEDURE — 71000033 HC RECOVERY, INTIAL HOUR: Performed by: OTOLARYNGOLOGY

## 2023-12-19 PROCEDURE — 36000706: Performed by: OTOLARYNGOLOGY

## 2023-12-19 PROCEDURE — D9220A PRA ANESTHESIA: ICD-10-PCS | Mod: CRNA,,, | Performed by: NURSE ANESTHETIST, CERTIFIED REGISTERED

## 2023-12-19 PROCEDURE — 63600175 PHARM REV CODE 636 W HCPCS: Performed by: OTOLARYNGOLOGY

## 2023-12-19 PROCEDURE — 71000015 HC POSTOP RECOV 1ST HR: Performed by: OTOLARYNGOLOGY

## 2023-12-19 PROCEDURE — 71000016 HC POSTOP RECOV ADDL HR: Performed by: OTOLARYNGOLOGY

## 2023-12-19 PROCEDURE — 71000039 HC RECOVERY, EACH ADD'L HOUR: Performed by: OTOLARYNGOLOGY

## 2023-12-19 PROCEDURE — 25000003 PHARM REV CODE 250: Performed by: ANESTHESIOLOGY

## 2023-12-19 PROCEDURE — D9220A PRA ANESTHESIA: Mod: CRNA,,, | Performed by: NURSE ANESTHETIST, CERTIFIED REGISTERED

## 2023-12-19 PROCEDURE — 25000003 PHARM REV CODE 250: Performed by: OTOLARYNGOLOGY

## 2023-12-19 PROCEDURE — 37000008 HC ANESTHESIA 1ST 15 MINUTES: Performed by: OTOLARYNGOLOGY

## 2023-12-19 PROCEDURE — 37000009 HC ANESTHESIA EA ADD 15 MINS: Performed by: OTOLARYNGOLOGY

## 2023-12-19 RX ORDER — ONDANSETRON 2 MG/ML
4 INJECTION INTRAMUSCULAR; INTRAVENOUS DAILY PRN
Status: DISCONTINUED | OUTPATIENT
Start: 2023-12-19 | End: 2023-12-19 | Stop reason: HOSPADM

## 2023-12-19 RX ORDER — DEXAMETHASONE SODIUM PHOSPHATE 4 MG/ML
INJECTION, SOLUTION INTRA-ARTICULAR; INTRALESIONAL; INTRAMUSCULAR; INTRAVENOUS; SOFT TISSUE
Status: DISCONTINUED | OUTPATIENT
Start: 2023-12-19 | End: 2023-12-19

## 2023-12-19 RX ORDER — EPINEPHRINE 1 MG/ML
INJECTION, SOLUTION, CONCENTRATE INTRAVENOUS
Status: DISCONTINUED
Start: 2023-12-19 | End: 2023-12-19 | Stop reason: HOSPADM

## 2023-12-19 RX ORDER — BACITRACIN 500 [USP'U]/G
OINTMENT TOPICAL
Status: DISCONTINUED
Start: 2023-12-19 | End: 2023-12-19 | Stop reason: HOSPADM

## 2023-12-19 RX ORDER — METHOCARBAMOL 100 MG/ML
1000 INJECTION, SOLUTION INTRAMUSCULAR; INTRAVENOUS ONCE AS NEEDED
Status: COMPLETED | OUTPATIENT
Start: 2023-12-19 | End: 2023-12-19

## 2023-12-19 RX ORDER — MEPERIDINE HYDROCHLORIDE 25 MG/ML
6.25 INJECTION INTRAMUSCULAR; INTRAVENOUS; SUBCUTANEOUS ONCE AS NEEDED
Status: DISCONTINUED | OUTPATIENT
Start: 2023-12-19 | End: 2023-12-19 | Stop reason: HOSPADM

## 2023-12-19 RX ORDER — ONDANSETRON 4 MG/1
4 TABLET, ORALLY DISINTEGRATING ORAL
Status: COMPLETED | OUTPATIENT
Start: 2023-12-19 | End: 2023-12-19

## 2023-12-19 RX ORDER — LIDOCAINE HYDROCHLORIDE 10 MG/ML
1 INJECTION, SOLUTION EPIDURAL; INFILTRATION; INTRACAUDAL; PERINEURAL ONCE
Status: DISCONTINUED | OUTPATIENT
Start: 2023-12-19 | End: 2023-12-19 | Stop reason: HOSPADM

## 2023-12-19 RX ORDER — ONDANSETRON HYDROCHLORIDE 2 MG/ML
INJECTION, SOLUTION INTRAMUSCULAR; INTRAVENOUS
Status: DISCONTINUED | OUTPATIENT
Start: 2023-12-19 | End: 2023-12-19

## 2023-12-19 RX ORDER — SODIUM CHLORIDE, SODIUM GLUCONATE, SODIUM ACETATE, POTASSIUM CHLORIDE AND MAGNESIUM CHLORIDE 30; 37; 368; 526; 502 MG/100ML; MG/100ML; MG/100ML; MG/100ML; MG/100ML
INJECTION, SOLUTION INTRAVENOUS CONTINUOUS
Status: DISCONTINUED | OUTPATIENT
Start: 2023-12-19 | End: 2023-12-19 | Stop reason: HOSPADM

## 2023-12-19 RX ORDER — SEVOFLURANE 250 ML/250ML
LIQUID RESPIRATORY (INHALATION)
Status: DISCONTINUED
Start: 2023-12-19 | End: 2023-12-19 | Stop reason: HOSPADM

## 2023-12-19 RX ORDER — ACETAMINOPHEN 500 MG
1000 TABLET ORAL
Status: COMPLETED | OUTPATIENT
Start: 2023-12-19 | End: 2023-12-19

## 2023-12-19 RX ORDER — TRAMADOL HYDROCHLORIDE 50 MG/1
50 TABLET ORAL
Status: COMPLETED | OUTPATIENT
Start: 2023-12-19 | End: 2023-12-19

## 2023-12-19 RX ORDER — FENTANYL CITRATE 50 UG/ML
INJECTION, SOLUTION INTRAMUSCULAR; INTRAVENOUS
Status: DISCONTINUED | OUTPATIENT
Start: 2023-12-19 | End: 2023-12-19

## 2023-12-19 RX ORDER — HYDRALAZINE HYDROCHLORIDE 20 MG/ML
10 INJECTION INTRAMUSCULAR; INTRAVENOUS ONCE
Status: COMPLETED | OUTPATIENT
Start: 2023-12-19 | End: 2023-12-19

## 2023-12-19 RX ORDER — OXYMETAZOLINE HCL 0.05 %
2 SPRAY, NON-AEROSOL (ML) NASAL
Status: DISPENSED | OUTPATIENT
Start: 2023-12-19 | End: 2023-12-19

## 2023-12-19 RX ORDER — PROCHLORPERAZINE EDISYLATE 5 MG/ML
5 INJECTION INTRAMUSCULAR; INTRAVENOUS EVERY 30 MIN PRN
Status: DISCONTINUED | OUTPATIENT
Start: 2023-12-19 | End: 2023-12-19 | Stop reason: HOSPADM

## 2023-12-19 RX ORDER — MEPERIDINE HYDROCHLORIDE 25 MG/ML
50 INJECTION INTRAMUSCULAR; INTRAVENOUS; SUBCUTANEOUS EVERY 4 HOURS PRN
Status: DISCONTINUED | OUTPATIENT
Start: 2023-12-19 | End: 2023-12-19 | Stop reason: HOSPADM

## 2023-12-19 RX ORDER — CEFAZOLIN SODIUM 1 G/3ML
2 INJECTION, POWDER, FOR SOLUTION INTRAMUSCULAR; INTRAVENOUS ONCE
Status: COMPLETED | OUTPATIENT
Start: 2023-12-19 | End: 2023-12-19

## 2023-12-19 RX ORDER — HYDROCODONE BITARTRATE AND ACETAMINOPHEN 7.5; 325 MG/1; MG/1
1 TABLET ORAL EVERY 4 HOURS PRN
Status: DISCONTINUED | OUTPATIENT
Start: 2023-12-19 | End: 2023-12-19 | Stop reason: HOSPADM

## 2023-12-19 RX ORDER — GABAPENTIN 300 MG/1
300 CAPSULE ORAL
Status: COMPLETED | OUTPATIENT
Start: 2023-12-19 | End: 2023-12-19

## 2023-12-19 RX ORDER — LIDOCAINE HYDROCHLORIDE 20 MG/ML
INJECTION, SOLUTION EPIDURAL; INFILTRATION; INTRACAUDAL; PERINEURAL
Status: DISCONTINUED | OUTPATIENT
Start: 2023-12-19 | End: 2023-12-19

## 2023-12-19 RX ORDER — HYDRALAZINE HYDROCHLORIDE 20 MG/ML
INJECTION INTRAMUSCULAR; INTRAVENOUS
Status: DISCONTINUED
Start: 2023-12-19 | End: 2023-12-19 | Stop reason: HOSPADM

## 2023-12-19 RX ORDER — ROCURONIUM BROMIDE 10 MG/ML
INJECTION, SOLUTION INTRAVENOUS
Status: DISCONTINUED | OUTPATIENT
Start: 2023-12-19 | End: 2023-12-19

## 2023-12-19 RX ORDER — PROPOFOL 10 MG/ML
VIAL (ML) INTRAVENOUS
Status: DISCONTINUED | OUTPATIENT
Start: 2023-12-19 | End: 2023-12-19

## 2023-12-19 RX ORDER — GLYCOPYRROLATE 0.2 MG/ML
INJECTION INTRAMUSCULAR; INTRAVENOUS
Status: DISCONTINUED | OUTPATIENT
Start: 2023-12-19 | End: 2023-12-19

## 2023-12-19 RX ORDER — HYDROCODONE BITARTRATE AND ACETAMINOPHEN 5; 325 MG/1; MG/1
1 TABLET ORAL
Status: CANCELLED | OUTPATIENT
Start: 2023-12-19

## 2023-12-19 RX ORDER — MIDAZOLAM HYDROCHLORIDE 1 MG/ML
2 INJECTION INTRAMUSCULAR; INTRAVENOUS ONCE AS NEEDED
Status: COMPLETED | OUTPATIENT
Start: 2023-12-19 | End: 2023-12-19

## 2023-12-19 RX ORDER — HYDROMORPHONE HYDROCHLORIDE 2 MG/ML
0.4 INJECTION, SOLUTION INTRAMUSCULAR; INTRAVENOUS; SUBCUTANEOUS EVERY 5 MIN PRN
Status: DISCONTINUED | OUTPATIENT
Start: 2023-12-19 | End: 2023-12-19 | Stop reason: HOSPADM

## 2023-12-19 RX ORDER — LIDOCAINE HYDROCHLORIDE AND EPINEPHRINE 5; 5 MG/ML; UG/ML
INJECTION, SOLUTION INFILTRATION; PERINEURAL
Status: DISCONTINUED | OUTPATIENT
Start: 2023-12-19 | End: 2023-12-19 | Stop reason: HOSPADM

## 2023-12-19 RX ORDER — BACITRACIN 500 [USP'U]/G
OINTMENT TOPICAL
Status: DISCONTINUED | OUTPATIENT
Start: 2023-12-19 | End: 2023-12-19 | Stop reason: HOSPADM

## 2023-12-19 RX ORDER — OLMESARTAN MEDOXOMIL 20 MG/1
40 TABLET ORAL DAILY
COMMUNITY

## 2023-12-19 RX ORDER — SODIUM CHLORIDE, SODIUM LACTATE, POTASSIUM CHLORIDE, CALCIUM CHLORIDE 600; 310; 30; 20 MG/100ML; MG/100ML; MG/100ML; MG/100ML
INJECTION, SOLUTION INTRAVENOUS CONTINUOUS
Status: DISCONTINUED | OUTPATIENT
Start: 2023-12-19 | End: 2023-12-19 | Stop reason: HOSPADM

## 2023-12-19 RX ORDER — ONDANSETRON 2 MG/ML
4 INJECTION INTRAMUSCULAR; INTRAVENOUS EVERY 4 HOURS PRN
Status: DISCONTINUED | OUTPATIENT
Start: 2023-12-19 | End: 2023-12-19 | Stop reason: HOSPADM

## 2023-12-19 RX ADMIN — PROPOFOL 200 MG: 10 INJECTION, EMULSION INTRAVENOUS at 10:12

## 2023-12-19 RX ADMIN — HYDROMORPHONE HYDROCHLORIDE 0.4 MG: 2 INJECTION INTRAMUSCULAR; INTRAVENOUS; SUBCUTANEOUS at 11:12

## 2023-12-19 RX ADMIN — METHOCARBAMOL 1000 MG: 100 INJECTION INTRAMUSCULAR; INTRAVENOUS at 11:12

## 2023-12-19 RX ADMIN — HYDRALAZINE HYDROCHLORIDE 10 MG: 20 INJECTION INTRAMUSCULAR; INTRAVENOUS at 11:12

## 2023-12-19 RX ADMIN — LIDOCAINE HYDROCHLORIDE 100 MG: 20 INJECTION, SOLUTION INTRAVENOUS at 10:12

## 2023-12-19 RX ADMIN — GABAPENTIN 300 MG: 300 CAPSULE ORAL at 09:12

## 2023-12-19 RX ADMIN — ONDANSETRON 4 MG: 4 TABLET, ORALLY DISINTEGRATING ORAL at 09:12

## 2023-12-19 RX ADMIN — DEXAMETHASONE SODIUM PHOSPHATE 12 MG: 4 INJECTION, SOLUTION INTRA-ARTICULAR; INTRALESIONAL; INTRAMUSCULAR; INTRAVENOUS; SOFT TISSUE at 10:12

## 2023-12-19 RX ADMIN — SODIUM CHLORIDE, POTASSIUM CHLORIDE, SODIUM LACTATE AND CALCIUM CHLORIDE: 600; 310; 30; 20 INJECTION, SOLUTION INTRAVENOUS at 09:12

## 2023-12-19 RX ADMIN — MIDAZOLAM HYDROCHLORIDE 2 MG: 1 INJECTION, SOLUTION INTRAMUSCULAR; INTRAVENOUS at 09:12

## 2023-12-19 RX ADMIN — FENTANYL CITRATE 50 MCG: 50 INJECTION, SOLUTION INTRAMUSCULAR; INTRAVENOUS at 10:12

## 2023-12-19 RX ADMIN — GLYCOPYRROLATE 0.2 MG: 0.2 INJECTION INTRAMUSCULAR; INTRAVENOUS at 10:12

## 2023-12-19 RX ADMIN — LIDOCAINE HYDROCHLORIDE 50 MG: 20 INJECTION, SOLUTION INTRAVENOUS at 10:12

## 2023-12-19 RX ADMIN — CEFAZOLIN 2 G: 330 INJECTION, POWDER, FOR SOLUTION INTRAMUSCULAR; INTRAVENOUS at 10:12

## 2023-12-19 RX ADMIN — ROCURONIUM BROMIDE 50 MG: 50 INJECTION INTRAVENOUS at 10:12

## 2023-12-19 RX ADMIN — ONDANSETRON 4 MG: 2 INJECTION INTRAMUSCULAR; INTRAVENOUS at 10:12

## 2023-12-19 RX ADMIN — Medication 2 SPRAY: at 09:12

## 2023-12-19 RX ADMIN — ACETAMINOPHEN 1000 MG: 500 TABLET ORAL at 09:12

## 2023-12-19 RX ADMIN — TRAMADOL HYDROCHLORIDE 50 MG: 50 TABLET, COATED ORAL at 09:12

## 2023-12-19 NOTE — ANESTHESIA POSTPROCEDURE EVALUATION
Anesthesia Post Evaluation    Patient: Amadeo Jha    Procedure(s) Performed: Procedure(s) (LRB):  SEPTOPLASTY, NOSE (N/A)  EXCISION, NASAL TURBINATE, SUBMUCOSAL (Bilateral)    Final Anesthesia Type: general      Patient location during evaluation: PACU  Patient participation: Yes- Able to Participate  Level of consciousness: awake and alert  Post-procedure vital signs: reviewed and stable  Pain management: adequate  Airway patency: patent    PONV status at discharge: No PONV  Anesthetic complications: no      Cardiovascular status: hemodynamically stable  Respiratory status: unassisted  Hydration status: euvolemic  Follow-up not needed.              Vitals Value Taken Time   /97 12/19/23 1110   Temp 36.6 °C (97.9 °F) 12/19/23 1105   Pulse 76 12/19/23 1119   Resp 10 12/19/23 1119   SpO2 100 % 12/19/23 1119   Vitals shown include unvalidated device data.      No case tracking events are documented in the log.      Pain/Radha Score: Pain Rating Prior to Med Admin: 0 (12/19/2023  9:07 AM)  Radha Score: 8 (12/19/2023 11:05 AM)

## 2023-12-19 NOTE — ANESTHESIA PREPROCEDURE EVALUATION
12/19/2023  Amadeo Jha is a 48 y.o., male with ----------------------------  ADD (attention deficit disorder)  Deviated nasal septum  Hypertension  PINKY (obstructive sleep apnea)      Comment:  no cpap  Painful swallowing    And ----------------------------  Cervical fusion  Knee arthroscopy w/ acl reconstruction  Left elbow  Repair of ligament of wrist      Comment:  Procedure: REPAIR, LIGAMENT, WRIST;  Surgeon: Garry Gray MD;  Location: [facility];  Service: Orthopedics;                 Laterality: Right;    Presents for nasal septoplasty  Pt with PINKY but noncompliant with sleep panea       Pre-op Assessment    I have reviewed the NPO Status.      Review of Systems  Cardiovascular:     Hypertension                                  Hypertension         Pulmonary:        Sleep Apnea     Obstructive Sleep Apnea (PINKY).           Psych:  Psychiatric History                  Physical Exam  General: Well nourished, Cooperative, Alert and Oriented    Airway:  Mallampati: II   Mouth Opening: Normal  TM Distance: Normal  Tongue: Normal  Neck ROM: Normal ROM  Redundant tissue  thick neck circumferance  Dental:  Intact    Chest/Lungs:  Clear to auscultation, Normal Respiratory Rate    Heart:  Rate: Normal  Rhythm: Regular Rhythm        Anesthesia Plan  Type of Anesthesia, risks & benefits discussed:    Anesthesia Type: Gen ETT  Intra-op Monitoring Plan: Standard ASA Monitors  Post Op Pain Control Plan: multimodal analgesia and IV/PO Opioids PRN  Induction:  IV  Airway Plan: Direct and Video, Post-Induction  Informed Consent: Informed consent signed with the Patient and all parties understand the risks and agree with anesthesia plan.  All questions answered.   ASA Score: 2  Day of Surgery Review of History & Physical: H&P Update referred to the surgeon/provider.  Anesthesia Plan Notes:  Premedication: Midazolam  Special Technique: Preemptive analgesia with Neurontin, zofran, acetaminophen and celebrex  or tramadol  PONV prophylaxis with intraop zofran, decadron     Ready For Surgery From Anesthesia Perspective.     .

## 2023-12-19 NOTE — TRANSFER OF CARE
Anesthesia Transfer of Care Note    Patient: Amadeo Jha    Procedure(s) Performed: Procedure(s) (LRB):  SEPTOPLASTY, NOSE (N/A)  EXCISION, NASAL TURBINATE, SUBMUCOSAL (Bilateral)    Patient location: PACU    Anesthesia Type: general    Transport from OR: Transported from OR on room air with adequate spontaneous ventilation    Post pain: adequate analgesia    Post assessment: no apparent anesthetic complications and tolerated procedure well    Post vital signs: stable    Level of consciousness: awake    Nausea/Vomiting: no nausea/vomiting    Complications: none    Transfer of care protocol was followed      Last vitals: Visit Vitals  BP (!) 144/100   Pulse 67   Temp 36.8 °C (98.2 °F) (Oral)   Resp 18   Ht 6' (1.829 m)   Wt 120.5 kg (265 lb 10.5 oz)   SpO2 98%   BMI 36.03 kg/m²

## 2023-12-19 NOTE — ANESTHESIA PROCEDURE NOTES
Intubation    Date/Time: 12/19/2023 10:18 AM    Performed by: Aashish Christianson CRNA  Authorized by: Aashish Christianson CRNA    Intubation:     Induction:  Intravenous    Intubated:  Postinduction    Mask Ventilation:  Easy with oral airway    Attempts:  2    Attempted By:  CRNA    Method of Intubation:  Video laryngoscopy    Blade:  Dumont 4    Laryngeal View Grade: Grade IIA - cords partially seen      Attempted By (2nd Attempt):  Staff anesthesiologist    Method of Intubation (2nd Attempt):  Video laryngoscopy    Blade (2nd Attempt):  Glidescope 4    Laryngeal View Grade (2nd Attempt): Grade I - full view of cords      Difficult Airway Encountered?: Yes      Future Airway Recommendations:  Glidescope 4 first    Complications:  None    Airway Device:  Oral endotracheal tube    Airway Device Size:  7.5    Style/Cuff Inflation:  Cuffed    Inflation Amount (mL):  6    Tube secured:  23    Secured at:  The lips    Placement Verified By:  Capnometry    Complicating Factors:  Obesity, poor neck/head extension, deviated trachea and oropharyngeal edema or fat    Findings Post-Intubation:  BS equal bilateral

## 2023-12-20 VITALS
HEIGHT: 72 IN | RESPIRATION RATE: 20 BRPM | BODY MASS INDEX: 35.98 KG/M2 | DIASTOLIC BLOOD PRESSURE: 84 MMHG | OXYGEN SATURATION: 98 % | HEART RATE: 92 BPM | SYSTOLIC BLOOD PRESSURE: 131 MMHG | WEIGHT: 265.63 LBS | TEMPERATURE: 98 F

## 2023-12-20 LAB — PSYCHE PATHOLOGY RESULT: NORMAL

## 2023-12-29 NOTE — OP NOTE
[facility] [address] [place] [address]    PATIENT NAME:      GUI THOMPSON   YOB: 1975  CSN:               [id number]  MRN:               [id number]  ADMIT DATE:        12/19/2023 07:40:00  PHYSICIAN:         Olayinka Harper MD                          OPERATIVE REPORT      DATE OF SURGERY:    12/19/2023 07:57:48    SURGEON:  Olayinka Harper MD    PREOPERATIVE DIAGNOSES:    1. Nasal obstruction.  2. Nasal septal deviation.  3. Hypertrophy of bilateral inferior turbinates.    PROCEDURES PERFORMED:  Septoplasty and submucosal resection of bilateral   inferior turbinates.    ANESTHESIA:  General.    BLOOD LOSS:  Minimal.    PROCEDURE IN DETAIL:  With proper consent and information, the patient was   brought to the operative suite, placed in supine position.  Anesthesia   administered general anesthesia with endotracheal intubation.  Patient was   prepped and draped in sterile fashion.  The nasal cavity was injected with 6 cc   of 1% lidocaine with 1:100,000 epinephrine and decongested with cocaine-soaked   pledgets.  Starting on the patient's left side, the patient's middle turbinate   was medialized using a [place] elevator, and the uncinate process was identified   and removed from the superior to inferior fashion using backbiting forceps and   shaver microdebrider, entering the patient's natural maxillary ostium, where it   was circumferentially enlarged with backbiting forceps and the shaver   microdebrider.  Purulent diseased tissue was removed from the sinus.  The   anterior ethmoidal bulla was entered with a Hansen suction under Stealth   navigation.  We then carried the dissection through the patient's anterior and   posterior ethmoidal cells to the level of the anterior wall of the sphenoid   sinus.  The natural sphenoid ostium was identified and palpated and entered with   a  Hansen suction and circumferentially enlarged with the shaver microdebrider,   removing some inflammatory diseased tissue.  Then, we next turned our attention   to palpation of patient's nasofrontal recess which was identified and with   Stealth navigation entered.  Secondary to the patient's inflammatory tissue   after enlargement of the patient's nasofrontal recess and frontal sinus ostium,   a medicated Propel stent was placed within the nasofrontal recess.  The frontal   sinus was explored and diseased tissue was removed.  Then, we turn our attention   to the patient's right side where the identical procedure was performed.     Septoplasty was performed by making a hemitransfixion incision on the patient's   left side.  A mucoperichondrial flap was then elevated.  The deviated components   of the nasal septum were removed, leaving 1.5 cm dorsal and caudal struts to   aid in the support.  The hemitransfixion incision was then closed with a chromic   suture and then reapproximated with a running 5-0 plain gut suture.    Bilateral submucosal resection of the inferior turbinates was completed by   addressing the inferior turbinates with a shaver microdebrider, removing the   submucosal tissue and then outfractured laterally with a Echeverria elevator.    NasoPore was placed in the bilateral middle meatus with hemostasis.  The patient   was then turned over to Anesthesia for extubation.  Patient tolerated the   procedure well without complication.    Septoplasty was performed by making a hemitransfixion incision on the patient's   left side. A mucoperichondrial flap was then elevated. The deviated components   of the nasal septum were removed, leaving 1.5 cm dorsal and caudal struts to aid   in the support. The hemitransfixion incision was then closed with a chromic   suture and then reapproximated with a running 5-0 plain gut suture.    Bilateral submucosal resection of the inferior turbinates was completed by    addressing the inferior turbinates with a shaver microdebrider, removing the   submucosal tissue and then outfractured laterally with a Echeverria elevator.    NasoPore was placed in the bilateral middle meatus with hemostasis. The patient   was then turned over to Anesthesia for extubation. Patient tolerated the   procedure well without complication.        ______________________________  MD NISA Escobar/JOHNNIE  DD:  12/28/2023  Time:  01:49PM  DT:  12/28/2023  Time:  08:34PM  Job #:  [id number]      OPERATIVE REPORT

## 2024-01-10 ENCOUNTER — OFFICE VISIT (OUTPATIENT)
Dept: ORTHOPEDICS | Facility: CLINIC | Age: 49
End: 2024-01-10
Payer: COMMERCIAL

## 2024-01-10 DIAGNOSIS — M25.511 RIGHT SHOULDER PAIN, UNSPECIFIED CHRONICITY: Primary | ICD-10-CM

## 2024-01-10 DIAGNOSIS — M75.121 COMPLETE TEAR OF RIGHT ROTATOR CUFF, UNSPECIFIED WHETHER TRAUMATIC: ICD-10-CM

## 2024-01-10 DIAGNOSIS — M75.41 SHOULDER IMPINGEMENT SYNDROME, RIGHT: ICD-10-CM

## 2024-01-10 DIAGNOSIS — S43.004A DISLOCATION OF RIGHT SHOULDER JOINT, INITIAL ENCOUNTER: ICD-10-CM

## 2024-01-10 PROCEDURE — 20610 DRAIN/INJ JOINT/BURSA W/O US: CPT | Mod: RT,,, | Performed by: ORTHOPAEDIC SURGERY

## 2024-01-10 PROCEDURE — 1159F MED LIST DOCD IN RCRD: CPT | Mod: CPTII,,, | Performed by: ORTHOPAEDIC SURGERY

## 2024-01-10 PROCEDURE — 99214 OFFICE O/P EST MOD 30 MIN: CPT | Mod: 25,,, | Performed by: ORTHOPAEDIC SURGERY

## 2024-01-10 PROCEDURE — 1160F RVW MEDS BY RX/DR IN RCRD: CPT | Mod: CPTII,,, | Performed by: ORTHOPAEDIC SURGERY

## 2024-01-10 RX ORDER — LIDOCAINE HYDROCHLORIDE 20 MG/ML
3 INJECTION, SOLUTION INFILTRATION; PERINEURAL
Status: DISCONTINUED | OUTPATIENT
Start: 2024-01-10 | End: 2024-01-10 | Stop reason: HOSPADM

## 2024-01-10 RX ORDER — METHYLPREDNISOLONE ACETATE 80 MG/ML
80 INJECTION, SUSPENSION INTRA-ARTICULAR; INTRALESIONAL; INTRAMUSCULAR; SOFT TISSUE
Status: DISCONTINUED | OUTPATIENT
Start: 2024-01-10 | End: 2024-01-10 | Stop reason: HOSPADM

## 2024-01-10 RX ADMIN — LIDOCAINE HYDROCHLORIDE 3 MG: 20 INJECTION, SOLUTION INFILTRATION; PERINEURAL at 09:01

## 2024-01-10 RX ADMIN — METHYLPREDNISOLONE ACETATE 80 MG: 80 INJECTION, SUSPENSION INTRA-ARTICULAR; INTRALESIONAL; INTRAMUSCULAR; SOFT TISSUE at 09:01

## 2024-01-10 NOTE — PROGRESS NOTES
Subjective:    CC: Pain of the Right Shoulder (Right shoulder pain. Been hurting for about 2 weeks. Only hurts when he moves it. Only able to lift arm retirement. No other concerns besides the pain.)       HPI:  Patient comes in today for his right shoulder.  Patient states over the last couple of weeks he has had increasing pain, swelling about his right shoulder.  Patient states he has had multiple injuries in the past including dislocation, torn rotator cuff.  Denies any new or specific trauma he denies other complaints.    ROS: Refer to HPI for pertinent ROS. All other 12 point systems negative.    Objective:  There were no vitals filed for this visit.     Physical Exam:  Patient is well-nourished and well-developed, in no apparent distress, pleasant and cooperative. Examination of the right upper extremity compartments are soft and warm.  Skin is intact. There are no signs or symptoms of DVT or infection.   Patient is tender to palpation along the  anterolateral aspect .  Patient is able to forward flex and abduct to 80° with pain and discomfort.  Positive Michel and Neers, positive empty can, positive drop arm test. Negative sulcus sign. Stable to stressing. Neurovascularly intact distally.    Images:  X-rays three views right shoulder demonstrate degenerative changes. Images Reviewed and discussed with patient.    Assessment:  1. Right shoulder pain, unspecified chronicity  - X-ray Shoulder 2 or More Views Right; Future    2. Complete tear of right rotator cuff, unspecified whether traumatic    3. Dislocation of right shoulder joint, initial encounter    4. Shoulder impingement syndrome, right  - Large Joint Aspiration/Injection: R subacromial bursa        Plan:  At this time we discussed his physical exam and x-ray findings.  We have discussed his underlying posttraumatic arthritis.  I also concerned for rotator cuff tear, we will proceed with an MRI, I would like see back next week with his results.  He  tolerated his steroid injection very well to the right shoulder, subacromial space.    Follow UP: No follow-ups on file.    Large Joint Aspiration/Injection: R subacromial bursa    Date/Time: 1/10/2024 9:00 AM    Performed by: Eric Foy MD  Authorized by: Eric Foy MD    Consent Done?:  Yes (Verbal)  Indications:  Pain  Site marked: the procedure site was marked    Timeout: prior to procedure the correct patient, procedure, and site was verified    Prep: patient was prepped and draped in usual sterile fashion    Approach:  Posterior  Location:  Shoulder  Site:  R subacromial bursa  Medications:  3 mg LIDOcaine HCL 20 mg/ml (2%) 20 mg/mL (2 %); 80 mg methylPREDNISolone acetate 80 mg/mL  Patient tolerance:  Patient tolerated the procedure well with no immediate complications

## 2024-01-10 NOTE — PROCEDURES
Large Joint Aspiration/Injection: R subacromial bursa    Date/Time: 1/10/2024 9:00 AM    Performed by: Eric Foy MD  Authorized by: Eric Foy MD    Consent Done?:  Yes (Verbal)  Indications:  Pain  Site marked: the procedure site was marked    Timeout: prior to procedure the correct patient, procedure, and site was verified    Prep: patient was prepped and draped in usual sterile fashion    Approach:  Posterior  Location:  Shoulder  Site:  R subacromial bursa  Medications:  3 mg LIDOcaine HCL 20 mg/ml (2%) 20 mg/mL (2 %); 80 mg methylPREDNISolone acetate 80 mg/mL  Patient tolerance:  Patient tolerated the procedure well with no immediate complications

## 2024-01-12 ENCOUNTER — HOSPITAL ENCOUNTER (OUTPATIENT)
Dept: RADIOLOGY | Facility: HOSPITAL | Age: 49
Discharge: HOME | End: 2024-01-12
Attending: ORTHOPAEDIC SURGERY
Payer: COMMERCIAL

## 2024-01-12 PROCEDURE — 73221 MRI JOINT UPR EXTREM W/O DYE: CPT | Mod: TC,RT

## 2024-01-18 DIAGNOSIS — K22.2 STRICTURE AND STENOSIS OF ESOPHAGUS: Primary | ICD-10-CM

## 2024-01-22 ENCOUNTER — HOSPITAL ENCOUNTER (OUTPATIENT)
Dept: PREADMISSION TESTING | Facility: HOSPITAL | Age: 49
Discharge: HOME | End: 2024-01-22
Attending: ORTHOPAEDIC SURGERY
Payer: COMMERCIAL

## 2024-01-22 ENCOUNTER — HOSPITAL ENCOUNTER (OUTPATIENT)
Dept: CARDIOLOGY | Facility: HOSPITAL | Age: 49
Discharge: HOME | End: 2024-01-22
Attending: ORTHOPAEDIC SURGERY
Payer: COMMERCIAL

## 2024-01-22 ENCOUNTER — OFFICE VISIT (OUTPATIENT)
Dept: ORTHOPEDICS | Facility: CLINIC | Age: 49
End: 2024-01-22
Payer: COMMERCIAL

## 2024-01-22 DIAGNOSIS — S46.011A TRAUMATIC TEAR OF RIGHT ROTATOR CUFF, UNSPECIFIED TEAR EXTENT, INITIAL ENCOUNTER: Primary | ICD-10-CM

## 2024-01-22 DIAGNOSIS — S43.431A SUPERIOR GLENOID LABRUM LESION OF RIGHT SHOULDER, INITIAL ENCOUNTER: ICD-10-CM

## 2024-01-22 DIAGNOSIS — S46.011A TRAUMATIC TEAR OF RIGHT ROTATOR CUFF, UNSPECIFIED TEAR EXTENT, INITIAL ENCOUNTER: ICD-10-CM

## 2024-01-22 DIAGNOSIS — Z01.818 PREOP TESTING: ICD-10-CM

## 2024-01-22 DIAGNOSIS — M75.41 SHOULDER IMPINGEMENT SYNDROME, RIGHT: ICD-10-CM

## 2024-01-22 PROCEDURE — 1160F RVW MEDS BY RX/DR IN RCRD: CPT | Mod: CPTII,,, | Performed by: ORTHOPAEDIC SURGERY

## 2024-01-22 PROCEDURE — 99214 OFFICE O/P EST MOD 30 MIN: CPT | Mod: ,,, | Performed by: ORTHOPAEDIC SURGERY

## 2024-01-22 PROCEDURE — 93005 ELECTROCARDIOGRAM TRACING: CPT

## 2024-01-22 PROCEDURE — 99900031 HC PATIENT EDUCATION (STAT)

## 2024-01-22 PROCEDURE — 1159F MED LIST DOCD IN RCRD: CPT | Mod: CPTII,,, | Performed by: ORTHOPAEDIC SURGERY

## 2024-01-22 PROCEDURE — 93010 ELECTROCARDIOGRAM REPORT: CPT | Mod: ,,, | Performed by: STUDENT IN AN ORGANIZED HEALTH CARE EDUCATION/TRAINING PROGRAM

## 2024-01-22 RX ORDER — FENTANYL CITRATE 50 UG/ML
50 INJECTION, SOLUTION INTRAMUSCULAR; INTRAVENOUS
Status: CANCELLED | OUTPATIENT
Start: 2024-01-22

## 2024-01-22 RX ORDER — MIDAZOLAM HYDROCHLORIDE 1 MG/ML
2 INJECTION INTRAMUSCULAR; INTRAVENOUS
Status: CANCELLED | OUTPATIENT
Start: 2024-01-22 | End: 2024-01-22

## 2024-01-22 RX ORDER — ONDANSETRON HYDROCHLORIDE 2 MG/ML
4 INJECTION, SOLUTION INTRAVENOUS DAILY PRN
Status: CANCELLED | OUTPATIENT
Start: 2024-01-22

## 2024-01-22 RX ORDER — HYDROCODONE BITARTRATE AND ACETAMINOPHEN 5; 325 MG/1; MG/1
1 TABLET ORAL
Status: CANCELLED | OUTPATIENT
Start: 2024-01-22

## 2024-01-22 RX ORDER — MEPERIDINE HYDROCHLORIDE 25 MG/ML
12.5 INJECTION INTRAMUSCULAR; INTRAVENOUS; SUBCUTANEOUS ONCE AS NEEDED
Status: CANCELLED | OUTPATIENT
Start: 2024-01-22 | End: 2024-01-23

## 2024-01-22 RX ORDER — DIPHENHYDRAMINE HYDROCHLORIDE 50 MG/ML
12.5 INJECTION INTRAMUSCULAR; INTRAVENOUS ONCE AS NEEDED
Status: CANCELLED | OUTPATIENT
Start: 2024-01-22 | End: 2035-06-20

## 2024-01-22 RX ORDER — FENTANYL CITRATE 50 UG/ML
25 INJECTION, SOLUTION INTRAMUSCULAR; INTRAVENOUS
Status: CANCELLED | OUTPATIENT
Start: 2024-01-22 | End: 2024-01-22

## 2024-01-22 RX ORDER — FENTANYL CITRATE 50 UG/ML
25 INJECTION, SOLUTION INTRAMUSCULAR; INTRAVENOUS EVERY 5 MIN PRN
Status: CANCELLED | OUTPATIENT
Start: 2024-01-22

## 2024-01-22 RX ORDER — SODIUM CHLORIDE 9 MG/ML
INJECTION, SOLUTION INTRAVENOUS CONTINUOUS
Status: CANCELLED | OUTPATIENT
Start: 2024-01-22

## 2024-01-22 RX ORDER — ROPIVACAINE HYDROCHLORIDE 5 MG/ML
30 INJECTION, SOLUTION EPIDURAL; INFILTRATION; PERINEURAL ONCE
Status: CANCELLED | OUTPATIENT
Start: 2024-01-22 | End: 2024-01-22

## 2024-01-22 RX ORDER — MIDAZOLAM HYDROCHLORIDE 1 MG/ML
1 INJECTION INTRAMUSCULAR; INTRAVENOUS
Status: CANCELLED | OUTPATIENT
Start: 2024-01-22 | End: 2024-01-22

## 2024-01-22 RX ORDER — SODIUM CHLORIDE, SODIUM GLUCONATE, SODIUM ACETATE, POTASSIUM CHLORIDE AND MAGNESIUM CHLORIDE 30; 37; 368; 526; 502 MG/100ML; MG/100ML; MG/100ML; MG/100ML; MG/100ML
INJECTION, SOLUTION INTRAVENOUS CONTINUOUS
Status: CANCELLED | OUTPATIENT
Start: 2024-01-22

## 2024-01-22 NOTE — PROGRESS NOTES
Subjective:    CC: Follow-up of the Right Shoulder (F/U for right shoulder injection 1/10/24. Injection didn't help. Pt seems to think it made it worse. Pt says shoulder has since popped out of place. )       HPI:  Patient returns today for repeat exam.  Patient continues to have pain, loss of motion in his right shoulder.  He states the injection did not help.  He feels a popping and catching in his shoulder.  We have discussed his MRI.    ROS: Refer to HPI for pertinent ROS. All other 12 point systems negative.    Objective:  There were no vitals filed for this visit.     Physical Exam:  Patient is well-nourished and well-developed, in no apparent distress, pleasant and cooperative. Examination of the right upper extremity compartments are soft and warm.  Skin is intact. There are no signs or symptoms of DVT or infection.   Patient is tender to palpation along the  anterior and anterolateral aspect .  Patient is able to forward flex and abduct to 95°.  Positive Michel and Neers, positive empty can, positive drop arm test. Negative sulcus sign. Stable to stressing. Neurovascularly intact distally.    Images:  MRI reviewed. Images Reviewed and discussed with patient.    Assessment:  1. Traumatic tear of right rotator cuff, unspecified tear extent, initial encounter  - Place in Outpatient; Standing  - Vital signs; Standing  - Insert peripheral IV; Standing  - Clip and Prep Other (please specifiy) (Operative site); Standing  - Cleanse with Chlorhexidine (CHG); Standing  - Diet NPO; Standing  - electrolyte-A infusion  - IP VTE LOW RISK PATIENT; Standing  - Place JOSE ANTONIO hose; Standing  - Place sequential compression device; Standing  - CBC auto differential; Future  - Comprehensive metabolic panel; Future  - EKG 12-lead; Future  - Inpatient consult to Anesthesiology; Standing  - Case Request Operating Room: DEBRIDEMENT, SHOULDER, ARTHROSCOPIC, REPAIR, ROTATOR CUFF, ARTHROSCOPIC, ARTHROSCOPY, SHOULDER, WITH SLAP REPAIR,  TENOTOMY, BICEPS, ARTHROSCOPIC  - Full code; Standing  - ceFAZolin (ANCEF) 3 g in dextrose 5 % (D5W) 50 mL IVPB    2. Shoulder impingement syndrome, right  - Place in Outpatient; Standing  - Vital signs; Standing  - Insert peripheral IV; Standing  - Clip and Prep Other (please specifiy) (Operative site); Standing  - Cleanse with Chlorhexidine (CHG); Standing  - Diet NPO; Standing  - electrolyte-A infusion  - IP VTE LOW RISK PATIENT; Standing  - Place JOSE ANTONIO hose; Standing  - Place sequential compression device; Standing  - CBC auto differential; Future  - Comprehensive metabolic panel; Future  - EKG 12-lead; Future  - Inpatient consult to Anesthesiology; Standing  - Case Request Operating Room: DEBRIDEMENT, SHOULDER, ARTHROSCOPIC, REPAIR, ROTATOR CUFF, ARTHROSCOPIC, ARTHROSCOPY, SHOULDER, WITH SLAP REPAIR, TENOTOMY, BICEPS, ARTHROSCOPIC  - Full code; Standing  - ceFAZolin (ANCEF) 3 g in dextrose 5 % (D5W) 50 mL IVPB    3. Superior glenoid labrum lesion of right shoulder, initial encounter  - Place in Outpatient; Standing  - Vital signs; Standing  - Insert peripheral IV; Standing  - Clip and Prep Other (please specifiy) (Operative site); Standing  - Cleanse with Chlorhexidine (CHG); Standing  - Diet NPO; Standing  - electrolyte-A infusion  - IP VTE LOW RISK PATIENT; Standing  - Place JOSE ANTONIO hose; Standing  - Place sequential compression device; Standing  - CBC auto differential; Future  - Comprehensive metabolic panel; Future  - EKG 12-lead; Future  - Inpatient consult to Anesthesiology; Standing  - Case Request Operating Room: DEBRIDEMENT, SHOULDER, ARTHROSCOPIC, REPAIR, ROTATOR CUFF, ARTHROSCOPIC, ARTHROSCOPY, SHOULDER, WITH SLAP REPAIR, TENOTOMY, BICEPS, ARTHROSCOPIC  - Full code; Standing  - ceFAZolin (ANCEF) 3 g in dextrose 5 % (D5W) 50 mL IVPB    4. Preop testing  - Place in Outpatient; Standing  - Vital signs; Standing  - Insert peripheral IV; Standing  - Clip and Prep Other (please specifiy) (Operative site);  Standing  - Cleanse with Chlorhexidine (CHG); Standing  - Diet NPO; Standing  - electrolyte-A infusion  - IP VTE LOW RISK PATIENT; Standing  - Place JOSE ANTONIO hose; Standing  - Place sequential compression device; Standing  - CBC auto differential; Future  - Comprehensive metabolic panel; Future  - EKG 12-lead; Future  - Inpatient consult to Anesthesiology; Standing  - Case Request Operating Room: DEBRIDEMENT, SHOULDER, ARTHROSCOPIC, REPAIR, ROTATOR CUFF, ARTHROSCOPIC, ARTHROSCOPY, SHOULDER, WITH SLAP REPAIR, TENOTOMY, BICEPS, ARTHROSCOPIC  - Full code; Standing  - ceFAZolin (ANCEF) 3 g in dextrose 5 % (D5W) 50 mL IVPB        Plan:  At this time we discussed his physical exam and MRI findings.  The risks benefits and alternatives discussed with the patient in detail.  All questions were answered.  We have discussed the down time rehab process afterwards.  He would like to proceed with surgical intervention, we will set this up at his convenience.    Follow UP: No follow-ups on file.

## 2024-01-24 ENCOUNTER — HOSPITAL ENCOUNTER (OUTPATIENT)
Facility: HOSPITAL | Age: 49
Discharge: HOME | End: 2024-01-24
Attending: ORTHOPAEDIC SURGERY | Admitting: ORTHOPAEDIC SURGERY
Payer: COMMERCIAL

## 2024-01-24 VITALS
BODY MASS INDEX: 34.28 KG/M2 | DIASTOLIC BLOOD PRESSURE: 102 MMHG | WEIGHT: 253.06 LBS | HEIGHT: 72 IN | SYSTOLIC BLOOD PRESSURE: 175 MMHG | TEMPERATURE: 98 F | OXYGEN SATURATION: 97 % | RESPIRATION RATE: 20 BRPM | HEART RATE: 74 BPM

## 2024-01-24 PROCEDURE — 99499 UNLISTED E&M SERVICE: CPT | Mod: ,,, | Performed by: ORTHOPAEDIC SURGERY

## 2024-01-24 RX ORDER — LANSOPRAZOLE 30 MG/1
30 CAPSULE, DELAYED RELEASE ORAL DAILY
COMMUNITY

## 2024-01-24 NOTE — PLAN OF CARE
Law Underwood spoke with patient. Patient still not approved by insurance for surgery. Will reschedule to next week per MD. Procedure cancelled.

## 2024-01-24 NOTE — PLAN OF CARE
Law CARRILLO spoke with patient. Patient still pending insurance approval. Will wait 15 more minutes and attempt to call insurance again to see if status has changed. If procedure still not approved will reschedule to later date.

## 2024-01-24 NOTE — H&P
Admission History & Physical    Subjective:    CC: No chief complaint on file.       HPI:  Amadeo Jha presents today for preoperative evaluation for right shoulder slap debridement, rtc repair. I reviewed the indications for surgery. The risks and benefits of the proposed and alternative treatments were discussed with the patient. Questions pertinent to the procedure were solicited and answered. Dr. Foy was available to answer any questions with instruction to call clinic with any further questions.No assurances were given. Informed consent was obtained. The patient expressed good understanding and wished to proceed with scheduling the procedure.     ROS:   Constitutional: No fever, weakness, or fatigue.   Ear/Nose/Mouth/Throat: No nasal congestion or sore throat.   Respiratory: No shortness of breath or cough.   Cardiovascular: No chest pain, palpitations, or peripheral edema.   Gastrointestinal: No nausea, vomiting, or abdominal pain.   Genitourinary: No dysuria.  Musculoskeletal: right shoulder pain, loss of motion    Past Surgical History:   Procedure Laterality Date    CERVICAL FUSION      EXCISION, NASAL TURBINATE, SUBMUCOSAL Bilateral 12/19/2023    Procedure: EXCISION, NASAL TURBINATE, SUBMUCOSAL;  Surgeon: Olayinka Harper MD;  Location: [facility] OR;  Service: ENT;  Laterality: Bilateral;    KNEE ARTHROSCOPY W/ ACL RECONSTRUCTION Right     left elbow      NASAL SEPTOPLASTY N/A 12/19/2023    Procedure: SEPTOPLASTY, NOSE;  Surgeon: Olayinka Harper MD;  Location: [facility] OR;  Service: ENT;  Laterality: N/A;    REPAIR OF LIGAMENT OF WRIST Right 7/25/2022    Procedure: REPAIR, LIGAMENT, WRIST;  Surgeon: Garry Gray MD;  Location: [facility] OR;  Service: Orthopedics;  Laterality: Right;        Past Medical History:   Diagnosis Date    ADD (attention deficit disorder)     Deviated nasal septum     Hypertension     PINKY (obstructive sleep apnea)     no cpap    Painful swallowing     Traumatic tear of right  rotator cuff         Objective:    There were no vitals filed for this visit.     Physical Exam:    Appearance: No distress, good color on room air. Alert and cooperative.  HEENT: Normocephalic. PERRLA EOM intact.   Lungs: Breathing unlabored.  Heart: Regular rate and rhythm.  Abdomen: Soft, non-tender.  No rebound tenderness.  Extremities: Examination of the right upper extremity compartments are soft and warm.  Skin is intact. There are no signs or symptoms of DVT or infection.   Patient is tender to palpation along the  anterior and anterolateral aspect .  Patient is able to forward flex and abduct to 95°.  Positive Michel and Neers, positive empty can, positive drop arm test. Negative sulcus sign. Stable to stressing. Neurovascularly intact distally.     Skin: No rashes or open wounds.        Assessment:  Right shoulder rtc tear, slap tear, shoulder impingement     Plan:  Plan for right shoulder arthroscopy, debridement, biceps tenotomy, Mini open rtc repair with sad at [facility]. The patient has been given preoperative instructions and prescriptions for post-operative medication. Post-operative appointment is scheduled for 2 weeks.

## 2024-01-25 ENCOUNTER — TELEPHONE (OUTPATIENT)
Dept: ORTHOPEDICS | Facility: CLINIC | Age: 49
End: 2024-01-25
Payer: COMMERCIAL

## 2024-01-25 NOTE — TELEPHONE ENCOUNTER
Spoke with the patient on 01/25/2024 at 1550.  Patient struggles with chronic shoulder pain with a history of previous dislocation rotator cuff repair not requiring surgical intervention at the time.  He is previously undergone physical therapy in the past.  He states he has been doing these exercises over the past 4 weeks at home and has failed to improve.  He feels that his motion is worsening.  He has not able to lift above shoulder height per patient report.  He is failed to improve given rest, medication, and steroid injection on 01/10/2024.  His most recent MRI does show tearing of the rotator cuff along with labral tearing and biceps tenosynovitis.  Patient states that he does not feel he would benefit from physical therapy as he experiences significant pain with overhead reaching and heavy lifting.  Given this I believe surgical intervention is his best option as I think waiting an additional 8-12 weeks for formal physical therapy will likely prolong treatment without alteration to his range of motion at current and risk loss of motion.  
foot pain/injury

## 2024-01-26 LAB — PATH REV: NORMAL

## 2024-01-29 DIAGNOSIS — S43.431A SUPERIOR GLENOID LABRUM LESION OF RIGHT SHOULDER, INITIAL ENCOUNTER: ICD-10-CM

## 2024-01-29 DIAGNOSIS — M75.41 SHOULDER IMPINGEMENT SYNDROME, RIGHT: ICD-10-CM

## 2024-01-29 DIAGNOSIS — S46.011A TRAUMATIC TEAR OF RIGHT ROTATOR CUFF, UNSPECIFIED TEAR EXTENT, INITIAL ENCOUNTER: Primary | ICD-10-CM

## 2024-01-30 ENCOUNTER — TELEPHONE (OUTPATIENT)
Dept: ORTHOPEDICS | Facility: CLINIC | Age: 49
End: 2024-01-30
Payer: COMMERCIAL

## 2024-01-30 NOTE — TELEPHONE ENCOUNTER
Patient called stating that he is having a lot of pain and discomfort causing him not to be able to sleep at night.     I advised that I would send a message to you regarding his pain. Advised that you are in surgery and I might not have an answer for him  until this afternoon.     Informed patient that I spoke to his insurance company as well as documentation in hopes to get his surgery authorized. He thinks that the physical therapy will just aggravate his shoulder with having 2 tears.     Please advise.

## 2024-01-31 DIAGNOSIS — S46.011A TRAUMATIC TEAR OF RIGHT ROTATOR CUFF, UNSPECIFIED TEAR EXTENT, INITIAL ENCOUNTER: Primary | ICD-10-CM

## 2024-01-31 RX ORDER — CELECOXIB 200 MG/1
200 CAPSULE ORAL DAILY
Qty: 30 CAPSULE | Refills: 0 | Status: SHIPPED | OUTPATIENT
Start: 2024-01-31 | End: 2024-02-27 | Stop reason: SDUPTHER

## 2024-02-05 ENCOUNTER — CLINICAL SUPPORT (OUTPATIENT)
Dept: REHABILITATION | Facility: HOSPITAL | Age: 49
End: 2024-02-05
Payer: COMMERCIAL

## 2024-02-05 DIAGNOSIS — M75.41 IMPINGEMENT SYNDROME OF RIGHT SHOULDER: ICD-10-CM

## 2024-02-05 DIAGNOSIS — S43.431A SUPERIOR GLENOID LABRUM LESION OF RIGHT SHOULDER, INITIAL ENCOUNTER: ICD-10-CM

## 2024-02-05 DIAGNOSIS — M75.41 SHOULDER IMPINGEMENT SYNDROME, RIGHT: ICD-10-CM

## 2024-02-05 DIAGNOSIS — M25.511 ACUTE PAIN OF RIGHT SHOULDER: ICD-10-CM

## 2024-02-05 DIAGNOSIS — S46.011A TRAUMATIC TEAR OF RIGHT ROTATOR CUFF, UNSPECIFIED TEAR EXTENT, INITIAL ENCOUNTER: Primary | ICD-10-CM

## 2024-02-05 DIAGNOSIS — R29.898 DECREASED STRENGTH OF UPPER EXTREMITY: ICD-10-CM

## 2024-02-05 DIAGNOSIS — M25.611 DECREASED ROM OF RIGHT SHOULDER: ICD-10-CM

## 2024-02-05 PROCEDURE — 97162 PT EVAL MOD COMPLEX 30 MIN: CPT

## 2024-02-05 NOTE — PLAN OF CARE
[facility]   Physical Therapy Initial Evaluation      Name: Amadeo Jha  Clinic Number: [id number]    Therapy Diagnosis:   Encounter Diagnoses   Name Primary?    Traumatic tear of right rotator cuff, unspecified tear extent, initial encounter Yes    Shoulder impingement syndrome, right     Superior glenoid labrum lesion of right shoulder, initial encounter     Impingement syndrome of right shoulder     Acute pain of right shoulder     Decreased ROM of right shoulder     Decreased strength of upper extremity         Physician: Sloane Escobedo PA*    Physician Orders: PT Eval and Treat, 2 wk 4  Medical Diagnosis from Referral: S46.011A- Traumatic tear of Right Rotator Cuff, M75.41- Shoulder Impingement Syndrome, S43.431A- Superior Glenoid Labrum Lesion of R shoulder  Evaluation Date: 2/5/2024  Authorization Period Expiration: TBD  Plan of Care Expiration: TBD  Reassessment / Plan of Care Due: 2/26/24  Visit # / Visits authorized: 0/      Functional shoulder FOTO score: 20 (2/5/24)    Precautions: Standard     Time In: 1330  Time Out: 1430  Total Appointment Time (timed & untimed codes): 60 minutes    Subjective     Date of onset / History of current condition - Amadeo reports: he initially injured his R shoulder in high school without treatment, eventually got better but he always had some pain and limitations. He woke up on 1/3/24 with severe pain in his R shoulder without any specific injury, maybe lifted / carried something heavy but no pop or sharp pain. He had MRI of his R shoulder and was on the operation table when the surgery was denied for him to try physical therapy before being approved. He has R shoulder pain with all movements, at rest without movement, with pain worse at night when trying to sleep, minimally able to use R UE with daily ADLs and IADLs. His shoulder ketches and gets caught with certain movements. Pt Left hand dominant. Pt taking Celebrex with minimal to  "no pain relief    Falls: none    Imaging: R shoulder MRI (1/12/24): A high-grade partial thickness articular surface tear is present to the distal supraspinatus tendon and the anterior fibers of the distal infraspinatus tendon without significant retraction or associated atrophy.     Multifocal degeneration of the glenoid labrum.  A tear is present to the anterior and superior portions of the glenoid labrum and is best seen on image 15 of series 6.     Osteoarthritic changes are present to the glenohumeral joint and are advanced for the patient's age.     Long head biceps tenosynovitis.     A 9 mm erosion or intraosseous geode is present to the greater tuberosity at the insertion of the supraspinatus tendon/footplate.  This is somewhat unusual given the patient's age and a superimposed inflammatory arthropathy may be considered under the appropriate clinical circumstances.    Prior Therapy: none  Social History:  lives with their spouse who is willing to assist pt as needed  Occupation: not working  Prior Level of Function: Independent  Current Level of Function: modified Independent with limited use of R UE    Pain:  Current 7/10, worst 10/10, best 5/10   Location: R shoulder  Description: Deep, burning, sharp, catching, constant  Aggravating Factors: everything at rest and will all movement  Easing Factors: nothing, un-catch shoulder     Patients goals: "have shoulder surgery"     Medical History:   Past Medical History:   Diagnosis Date    ADD (attention deficit disorder)     Deviated nasal septum     Hypertension     PINKY (obstructive sleep apnea)     no cpap    Painful swallowing     Traumatic tear of right rotator cuff        Surgical History:   Amadeo Jha  has a past surgical history that includes left elbow; Cervical fusion; Knee arthroscopy w/ ACL reconstruction (Right); Repair of ligament of wrist (Right, 7/25/2022); Nasal septoplasty (N/A, 12/19/2023); and excision, nasal turbinate, submucosal " (Bilateral, 12/19/2023).    Medications:   Amadeo has a current medication list which includes the following prescription(s): celecoxib, dextroamphetamine-amphetamine, febuxostat, lansoprazole, lisinopril-hydrochlorothiazide, olmesartan, oxycodone-acetaminophen, and vitamin d.    Allergies:   Review of patient's allergies indicates:  No Known Allergies     Objective      PROM Right Strength ( MMT) Comment   Shoulder Flexion: 110 degrees 2-/5 Pain with all movements   Shoulder Abduction: 95 degrees 2-/5    Shoulder ER, 90°ABD: 28 degrees 2-/5    Shoulder IR, 90° ABD: 70 degrees 2-/5      Elbow flexion and extension ROM: WNL  Elbow strength: 3+/5 MMT    Treatment     Total Treatment time (time-based codes) separate from Evaluation: 5 minutes     Amadeo received the treatments listed below:      therapeutic exercises to develop strength and ROM for 5 minutes including:    manual therapy techniques: Joint mobilizations were applied to the: R shoulder for 0 minutes, including:    Therapeutic Exercise   Therapeutic Exercise Grid     Exercise 1  Exercise 2  Exercise 3  Exercise 4    Exercise :    Pendulum: CW  CCW  Forward / back  Medial / lateral Table ROM: flexion  Scaption  ER   Scapular retraction Posterior shoulder shrugs   Repetition/Time :                  Resist or Assist :                  Comment :                Done :                                Exercise 5  Exercise 6  Exercise 7  Exercise 8    Exercise :    Isometric: flexion  Extension  Adduction  Abduction  IR  ER              Repetition/Time :                  Resist or Assist :                  Comment :                  Done :                                  Exercise 9  Exercise 10  Exercise 11  Exercise 12    Exercise :                  Repetition/Time :                  Resist or Assist :                  Comment :                  Done :                                 Patient Education and Home Exercises     Education provided:   - goal of PT is to  maintain shoulder ROM while waiting to get approved for surgery  - deficits found on MRI of R shoulder and what it means  - importance of performing HEP daily outside of therapy  - long and slow rehab process following surgery    Written Home Exercises Provided: Patient instructed to cont prior HEP. Exercises were reviewed and Amadeo was able to demonstrate them prior to the end of the session.  Amadeo demonstrated good understanding of the education provided. See EMR under Patient Instructions for exercises provided during therapy sessions.    Assessment     Amadeo is a 48 y.o. male referred to outpatient Physical Therapy with a medical diagnosis of traumatic tear of Right rotator cuff, shoulder impingement syndrome, lesion to superior glenoid labrum. Patient presents with significant pain in R shoulder at rest and with movement, limited R shoulder ROM and strength, and affected ADLs and IADLs limited tolerance with daily activities. Pt had severe R shoulder pain when trying to do minimal movements including pendulum exercise due to catching in his shoulder. Pt would benefit from PT services to address pt's deficits to get approved and prepare for surgery    Patient prognosis is Guarded.   Patient will benefit from skilled outpatient Physical Therapy to address the deficits stated above and in the chart below, provide patient /family education, and to maximize patientt's level of independence.     Plan of care discussed with patient: Yes  Patient's spiritual, cultural and educational needs considered and patient is agreeable to the plan of care and goals as stated below:     Anticipated Barriers for therapy: deficits found on MRI of R shoulder, constant severe pain, need of surgery, pt's cost for PT    Medical Necessity is demonstrated by the following  History  Co-morbidities and personal factors that may impact the plan of care [] LOW: no personal factors / co-morbidities  [x] MODERATE: 1-2 personal factors /  co-morbidities  [] HIGH: 3+ personal factors / co-morbidities    Moderate / High Support Documentation:   Co-morbidities affecting plan of care: see above    Personal Factors:        Examination  Body Structures and Functions, activity limitations and participation restrictions that may impact the plan of care [] LOW: addressing 1-2 elements  [x] MODERATE: 3+ elements  [] HIGH: 4+ elements (please support below)    Moderate / High Support Documentation: see above     Clinical Presentation [] LOW: stable  [x] MODERATE: Evolving  [] HIGH: Unstable     Decision Making/ Complexity Score: moderate       Goals:    Short Term Goals: 4 weeks     Pt will demonstrate knowledge and independence with HEP to continue with exercises outside of therapy to facilitate optimal overall improvements    Pt will improve functional score on shoulder FOTO by >10 points which indicates improved ability to perform daily tasks with less difficulty and pain    Reduce c/o pain in R shoulder to < 5/10 pain level with daily activities and sleep    Improve R shoulder PROM to 120 degrees of flexion, 105 degrees of abduction, 80 degrees of internal rotation, 40 degrees of external rotation to perform overhead reaching    Long Term Goals: 6 weeks     Pt will improve functional score on shoulder FOTO by >20 points which indicates improved ability to perform daily tasks with less difficulty and pain    Reduce c/o pain in R shoulder to < 3/10 pain level with daily activities and sleep    Improve R shoulder PROM to 140 degrees of flexion, 115 degrees of abduction, 90 degrees of internal rotation, 50 degrees of external rotation to perform overhead reaching    Pt get approved for R shoulder surgery    Plan     Plan of care Certification: TBD.    Outpatient Physical Therapy 2 times weekly for 6 weeks to include the following interventions: Manual Therapy, Patient Education, Therapeutic Exercise, and pain management .     Lanie Saunders, PT       I CERTIFY  THE NEED FOR THESE SERVICES FURNISHED UNDER THIS PLAN OF TREATMENT AND WHILE UNDER MY CARE   Physician's comments:      Physician's Signature: ___________________________________________________

## 2024-02-12 DIAGNOSIS — S46.011A TRAUMATIC TEAR OF RIGHT ROTATOR CUFF: Primary | ICD-10-CM

## 2024-02-12 DIAGNOSIS — S43.431A SUPERIOR GLENOID LABRUM LESION OF RIGHT SHOULDER: ICD-10-CM

## 2024-02-14 ENCOUNTER — CLINICAL SUPPORT (OUTPATIENT)
Dept: REHABILITATION | Facility: HOSPITAL | Age: 49
End: 2024-02-14
Payer: COMMERCIAL

## 2024-02-14 DIAGNOSIS — M25.611 DECREASED ROM OF RIGHT SHOULDER: ICD-10-CM

## 2024-02-14 DIAGNOSIS — S46.011A TRAUMATIC TEAR OF RIGHT ROTATOR CUFF, INITIAL ENCOUNTER: Primary | ICD-10-CM

## 2024-02-14 DIAGNOSIS — M75.41 IMPINGEMENT SYNDROME OF RIGHT SHOULDER: ICD-10-CM

## 2024-02-14 DIAGNOSIS — R29.898 DECREASED STRENGTH OF UPPER EXTREMITY: ICD-10-CM

## 2024-02-14 DIAGNOSIS — S43.431A SUPERIOR GLENOID LABRUM LESION OF RIGHT SHOULDER, INITIAL ENCOUNTER: ICD-10-CM

## 2024-02-14 DIAGNOSIS — M25.511 ACUTE PAIN OF RIGHT SHOULDER: ICD-10-CM

## 2024-02-14 PROCEDURE — 97110 THERAPEUTIC EXERCISES: CPT

## 2024-02-14 NOTE — PROGRESS NOTES
[facility]   Physical Therapy Treatment Note      Name: Amadeo Jha  Clinic Number: [id number]    Therapy Diagnosis:   Encounter Diagnoses   Name Primary?    Traumatic tear of right rotator cuff, initial encounter Yes    Impingement syndrome of right shoulder     Superior glenoid labrum lesion of right shoulder, initial encounter     Acute pain of right shoulder     Decreased ROM of right shoulder     Decreased strength of upper extremity      Physician: Sloane Escobedo PA*    Visit Date: 2/14/2024    Physician Orders: PT Eval and Treat, 2 wk 4  Medical Diagnosis from Referral: S46.011A- Traumatic tear of Right Rotator Cuff, M75.41- Shoulder Impingement Syndrome, S43.431A- Superior Glenoid Labrum Lesion of R shoulder  Evaluation Date: 2/5/2024  Authorization Period Expiration: 3/22/2024  Plan of Care Expiration: 3/22/2024  Reassessment / Plan of Care Due: 2/26/24  Visit # / Visits authorized: 1/12       Functional shoulder FOTO score: 20 (2/5/24)     Precautions: Standard      Time In: 1435  Time Out: 1503  Total Appointment Time (timed & untimed codes): 29 minutes    PTA Visit #: 0/5       Subjective     Patient reports: that he continues to have severe pain with minimal relief. Having difficulty finding a resting position to sleep at night.  He was compliant with home exercise program.  Response to previous treatment: HEP- Fair   Functional change: none    Pain: 7/10  Location: right shoulder      Objective      -Performed passive RANGE OF MOTION to right shoulder . Flexion - 120 deg  Abduction - 90 deg  External Rotation - 65 deg  Internal Rotation - 70 deg.   Painful in all directions.     Treatment     Amadoe received the treatments listed below:      therapeutic exercises to develop strength, ROM, flexibility, and posture for 30 minutes including:  Therapeutic Exercise Grid     Exercise 1  Exercise 2  Exercise 3  Exercise 4    Exercise :    Pendulum: CW  CCW  Forward /  back  Medial / lateral Table ROM: flexion  Scaption  ER    Scapular retraction Posterior shoulder shrugs   Repetition/Time :    20   10   10 x 5 sec        Resist or Assist :                  Comment :                 Done :    YES   YES    YES                          Exercise 5  Exercise 6  Exercise 7  Exercise 8    Exercise :    Isometric: flexion  Extension  Adduction  Abduction  IR  ER   R shoulder PROM  - all planes   Low rows    Inferior glides      Repetition/Time :       10   10 x 5 sec   10 x 5 secs     Resist or Assist :                  Comment :    Unable due to pain               Done :       YES    YES    YES                       Exercise 9  Exercise 10  Exercise 11  Exercise 12    Exercise :    Right GH mobs    STM   Cryotherapy pack   Biceps curl     Repetition/Time :    12   5 mins   10 mins   2 x 10     Resist or Assist :             2#     Comment :    All planes    Pec minor/teres    R Anterior shoulder        Done :    Yes    YES    YES    YES                       Exercise 13  Exercise 14 Exercise 15 Exercise 16    Exercise :    Triceps pres      Repetition/Time :    2 x10      Resist or Assist :    Red TB      Comment :          Done :    YES                     cold pack for 10 minutes to R anterior shoulder .    Patient Education and Home Exercises       Education provided:   - Reviewed HEP and precautions. Discussed postioning for sleeping    Written Home Exercises Provided: Patient instructed to cont prior HEP. Exercises were reviewed and Amadeo was able to demonstrate them prior to the end of the session.  Amadeo demonstrated good  understanding of the education provided. See Electronic Medical Record under Patient Instructions for exercises provided during therapy sessions    Assessment     Amadeo demonstrated negative results as of therapy today due to increased pain with most motion. Patient experienced severe guarding with all movements. Patient did experience mild relief with  mobilization of the gleno-humeral joint. Focused work on scapula movement and strengthening .     Amadeo Is progressing well towards his goals.   Patient prognosis is Fair.     Patient will continue to benefit from skilled outpatient physical therapy to address the deficits listed in the problem list box on initial evaluation, provide pt/family education and to maximize pt's level of independence in the home and community environment.     Patient's spiritual, cultural and educational needs considered and pt agreeable to plan of care and goals.     Anticipated barriers to physical therapy: Right rotator cuff tear     Goals:   Short Term Goals: 4 weeks      Pt will demonstrate knowledge and independence with HEP to continue with exercises outside of therapy to facilitate optimal overall improvements     Pt will improve functional score on shoulder FOTO by >10 points which indicates improved ability to perform daily tasks with less difficulty and pain     Reduce c/o pain in R shoulder to < 5/10 pain level with daily activities and sleep     Improve R shoulder PROM to 120 degrees of flexion, 105 degrees of abduction, 80 degrees of internal rotation, 40 degrees of external rotation to perform overhead reaching     Long Term Goals: 6 weeks      Pt will improve functional score on shoulder FOTO by >20 points which indicates improved ability to perform daily tasks with less difficulty and pain     Reduce c/o pain in R shoulder to < 3/10 pain level with daily activities and sleep     Improve R shoulder PROM to 140 degrees of flexion, 115 degrees of abduction, 90 degrees of internal rotation, 50 degrees of external rotation to perform overhead reaching     Pt get approved for R shoulder surgery       Plan     Plan of care Certification: 2/12/24 to 3/22/24     Outpatient Physical Therapy 2 times weekly for 6 weeks to include the following interventions: Manual Therapy, Patient Education, Therapeutic Exercise, and pain  management     Adam Chang, PT

## 2024-02-15 ENCOUNTER — TELEPHONE (OUTPATIENT)
Dept: ORTHOPEDICS | Facility: CLINIC | Age: 49
End: 2024-02-15
Payer: COMMERCIAL

## 2024-02-15 NOTE — TELEPHONE ENCOUNTER
Spoke to patient and advised that I am getting everything together to send for appeal. Both providers are out of office this week so hopefully on Monday morning I will have a signature and can submit it.

## 2024-02-16 ENCOUNTER — CLINICAL SUPPORT (OUTPATIENT)
Dept: REHABILITATION | Facility: HOSPITAL | Age: 49
End: 2024-02-16
Payer: COMMERCIAL

## 2024-02-16 DIAGNOSIS — M25.511 ACUTE PAIN OF RIGHT SHOULDER: ICD-10-CM

## 2024-02-16 DIAGNOSIS — S46.011A TRAUMATIC TEAR OF RIGHT ROTATOR CUFF, INITIAL ENCOUNTER: Primary | ICD-10-CM

## 2024-02-16 DIAGNOSIS — M25.611 DECREASED ROM OF RIGHT SHOULDER: ICD-10-CM

## 2024-02-16 DIAGNOSIS — S43.431A SUPERIOR GLENOID LABRUM LESION OF RIGHT SHOULDER, INITIAL ENCOUNTER: ICD-10-CM

## 2024-02-16 DIAGNOSIS — M75.41 IMPINGEMENT SYNDROME OF RIGHT SHOULDER: ICD-10-CM

## 2024-02-16 DIAGNOSIS — R29.898 DECREASED STRENGTH OF UPPER EXTREMITY: ICD-10-CM

## 2024-02-16 PROCEDURE — 97110 THERAPEUTIC EXERCISES: CPT | Mod: CQ

## 2024-02-16 NOTE — PROGRESS NOTES
"[facility]   Physical Therapy Treatment Note      Name: Amadeo Jha  Clinic Number: [id number]    Therapy Diagnosis:   Encounter Diagnoses   Name Primary?    Traumatic tear of right rotator cuff, initial encounter Yes    Impingement syndrome of right shoulder     Superior glenoid labrum lesion of right shoulder, initial encounter     Acute pain of right shoulder     Decreased ROM of right shoulder     Decreased strength of upper extremity      Physician: Sloane Escobedo PA*    Visit Date: 2/16/2024    Physician Orders: PT Eval and Treat, 2 wk 4  Medical Diagnosis from Referral: S46.011A- Traumatic tear of Right Rotator Cuff, M75.41- Shoulder Impingement Syndrome, S43.431A- Superior Glenoid Labrum Lesion of R shoulder  Evaluation Date: 2/5/2024  Authorization Period Expiration: 3/22/2024  Plan of Care Expiration: 3/22/2024  Reassessment / Plan of Care Due: 2/26/24  Visit # / Visits authorized: 2/12       Functional shoulder FOTO score: 20 (2/5/24)     Precautions: Standard      Time In: 0930  Time Out: 1000  Total Appointment Time (timed & untimed codes): 29 minutes    PTA Visit #: 1/5       Subjective     Patient reports: his pain remains the same. He has had some spasms in chest muscles. Awoke with severe pain and immobile shoulder 2 nights ago and had to roll out of bed. His shoulder "popped" loudly, and pain subsided a little.  He was compliant with home exercise program.  Response to previous treatment: HEP- Fair   Functional change: none    Pain: 7/10  Location: right shoulder      Objective      -Performed passive RANGE OF MOTION to right shoulder . Flexion - 120 deg  Abduction - 90 deg  External Rotation - 65 deg  Internal Rotation - 70 deg.   Painful in all directions.     Treatment     Amadeo received the treatments listed below:      therapeutic exercises to develop strength, ROM, flexibility, and posture for 30 minutes including:  Therapeutic Exercise Grid     Exercise 1 "  Exercise 2  Exercise 3  Exercise 4    Exercise :    Pendulum: CW  CCW  Forward / back  Medial / lateral Table ROM: flexion  Scaption  ER    Scapular retraction Posterior shoulder shrugs   Repetition/Time :    20   10   10 x 5 sec        Resist or Assist :                  Comment :                 Done :    YES   YES    YES                          Exercise 5  Exercise 6  Exercise 7  Exercise 8    Exercise :    Isometric: flexion  Extension  Adduction  Abduction  IR  ER   R shoulder PROM  - all planes   Low rows    Inferior glides      Repetition/Time :       10   10 x 5 sec   10 x 5 secs     Resist or Assist :                  Comment :    Unable due to pain               Done :       YES    YES    YES                       Exercise 9  Exercise 10  Exercise 11  Exercise 12    Exercise :    Right GH mobs    STM   Cryotherapy pack   Biceps curl     Repetition/Time :    12   5 mins   10 mins   2 x 10     Resist or Assist :             2#     Comment :    All planes    Pec minor/teres    R Anterior shoulder        Done :    Yes    YES    no  YES                       Exercise 13  Exercise 14 Exercise 15 Exercise 16    Exercise :    Triceps pres      Repetition/Time :    2 x10      Resist or Assist :    Red TB      Comment :          Done :    no                     cold pack for 10 minutes to R anterior shoulder .    Patient Education and Home Exercises       Education provided:   - Reviewed HEP and precautions. Discussed postioning for sleeping    Written Home Exercises Provided: Patient instructed to cont prior HEP. Exercises were reviewed and Amadeo was able to demonstrate them prior to the end of the session.  Amadeo demonstrated good  understanding of the education provided. See Electronic Medical Record under Patient Instructions for exercises provided during therapy sessions    Assessment     Amadeo continues with severe guarding and difficulty allowing for R shoulder mobilizations. Discussed correct performance  of pendulums and table glides, due to patient using too much force from muscles and causing increased pain. Crepitus present during mobilizations.     Amadeo Is progressing well towards his goals.   Patient prognosis is Fair.     Patient will continue to benefit from skilled outpatient physical therapy to address the deficits listed in the problem list box on initial evaluation, provide pt/family education and to maximize pt's level of independence in the home and community environment.     Patient's spiritual, cultural and educational needs considered and pt agreeable to plan of care and goals.     Anticipated barriers to physical therapy: Right rotator cuff tear     Goals:   Short Term Goals: 4 weeks      Pt will demonstrate knowledge and independence with HEP to continue with exercises outside of therapy to facilitate optimal overall improvements     Pt will improve functional score on shoulder FOTO by >10 points which indicates improved ability to perform daily tasks with less difficulty and pain     Reduce c/o pain in R shoulder to < 5/10 pain level with daily activities and sleep     Improve R shoulder PROM to 120 degrees of flexion, 105 degrees of abduction, 80 degrees of internal rotation, 40 degrees of external rotation to perform overhead reaching     Long Term Goals: 6 weeks      Pt will improve functional score on shoulder FOTO by >20 points which indicates improved ability to perform daily tasks with less difficulty and pain     Reduce c/o pain in R shoulder to < 3/10 pain level with daily activities and sleep     Improve R shoulder PROM to 140 degrees of flexion, 115 degrees of abduction, 90 degrees of internal rotation, 50 degrees of external rotation to perform overhead reaching     Pt get approved for R shoulder surgery       Plan     Plan of care Certification: 2/12/24 to 3/22/24     Outpatient Physical Therapy 2 times weekly for 6 weeks to include the following interventions: Manual Therapy,  Patient Education, Therapeutic Exercise, and pain management     Noe Madison, PTA

## 2024-02-20 ENCOUNTER — TELEPHONE (OUTPATIENT)
Dept: ORTHOPEDICS | Facility: CLINIC | Age: 49
End: 2024-02-20
Payer: COMMERCIAL

## 2024-02-20 ENCOUNTER — CLINICAL SUPPORT (OUTPATIENT)
Dept: REHABILITATION | Facility: HOSPITAL | Age: 49
End: 2024-02-20
Payer: COMMERCIAL

## 2024-02-20 DIAGNOSIS — M75.41 IMPINGEMENT SYNDROME OF RIGHT SHOULDER: ICD-10-CM

## 2024-02-20 DIAGNOSIS — R29.898 DECREASED STRENGTH OF UPPER EXTREMITY: ICD-10-CM

## 2024-02-20 DIAGNOSIS — M25.511 ACUTE PAIN OF RIGHT SHOULDER: ICD-10-CM

## 2024-02-20 DIAGNOSIS — S43.431A SUPERIOR GLENOID LABRUM LESION OF RIGHT SHOULDER, INITIAL ENCOUNTER: ICD-10-CM

## 2024-02-20 DIAGNOSIS — M25.611 DECREASED ROM OF RIGHT SHOULDER: ICD-10-CM

## 2024-02-20 DIAGNOSIS — S46.011A TRAUMATIC TEAR OF RIGHT ROTATOR CUFF, INITIAL ENCOUNTER: Primary | ICD-10-CM

## 2024-02-20 PROCEDURE — 97110 THERAPEUTIC EXERCISES: CPT | Mod: CQ

## 2024-02-20 NOTE — PROGRESS NOTES
[facility]   Physical Therapy Treatment Note      Name: Amadeo Jha  Clinic Number: [id number]    Therapy Diagnosis:   Encounter Diagnoses   Name Primary?    Traumatic tear of right rotator cuff, initial encounter Yes    Impingement syndrome of right shoulder     Superior glenoid labrum lesion of right shoulder, initial encounter     Acute pain of right shoulder     Decreased ROM of right shoulder     Decreased strength of upper extremity      Physician: Sloane Escobedo PA*    Visit Date: 2/20/2024    Physician Orders: PT Eval and Treat, 2 wk 4  Medical Diagnosis from Referral: S46.011A- Traumatic tear of Right Rotator Cuff, M75.41- Shoulder Impingement Syndrome, S43.431A- Superior Glenoid Labrum Lesion of R shoulder  Evaluation Date: 2/5/2024  Authorization Period Expiration: 3/22/2024  Plan of Care Expiration: 3/22/2024  Reassessment / Plan of Care Due: 2/26/24  Visit # / Visits authorized: 3/12       Functional shoulder FOTO score: 20 (2/5/24)     Precautions: Standard      Time In: 0800  Time Out: 0830  Total Appointment Time (timed & untimed codes): 30 minutes    PTA Visit #: 2/5       Subjective     Patient reports: woke up with a lot of pain this morning and today is his worse day.   He was not compliant with home exercise program.  Response to previous treatment: HEP- Fair   Functional change: none    Pain: 8/10  Location: right shoulder      Objective      -Performed passive RANGE OF MOTION to right shoulder   Painful in all directions.       Treatment     Amadeo received the treatments listed below:      therapeutic exercises to develop strength, ROM, flexibility, and posture for 30 minutes including:  Therapeutic Exercise Grid     Exercise 1  Exercise 2  Exercise 3  Exercise 4    Exercise :    Pendulum: CW  CCW  Forward / back  Medial / lateral Table ROM: flexion  Scaption  ER    Scapular retraction Posterior shoulder shrugs   Repetition/Time :    20   10   10 x 5 sec    10     Resist or Assist :                  Comment :                 Done :    YES   YES    YES    YES                      Exercise 5  Exercise 6  Exercise 7  Exercise 8    Exercise :    Isometric: flexion  Extension  Adduction  Abduction  IR  ER   R shoulder PROM  - all planes   Low rows    Inferior glides      Repetition/Time :       10   10 x 5 sec   10 x 5 secs     Resist or Assist :                  Comment :    Unable due to pain               Done :       YES    YES    YES                       Exercise 9  Exercise 10  Exercise 11  Exercise 12    Exercise :    Right GH mobs    STM   Cryotherapy pack   Biceps curl     Repetition/Time :    12   5 mins   10 mins   2 x 10     Resist or Assist :             2#     Comment :    All planes    Pec minor/teres    R Anterior shoulder        Done :     YES     YES                       Exercise 13  Exercise 14 Exercise 15 Exercise 16    Exercise :    Triceps pres (L) sidelying ER-AROM     Repetition/Time :    2 x10 15     Resist or Assist :    Red TB      Comment :          Done :    no  yes                   cold pack for 10 minutes to R anterior shoulder .    Patient Education and Home Exercises       Education provided:   - Reviewed HEP and precautions. Discussed postioning for sleeping    Written Home Exercises Provided: Patient instructed to cont prior HEP. Exercises were reviewed and Amadeo was able to demonstrate them prior to the end of the session.  Amadeo demonstrated good  understanding of the education provided. See Electronic Medical Record under Patient Instructions for exercises provided during therapy sessions    Assessment     Patient performed exercises and stretches with fair to poor effort today. Amadeo is still very limited by pain with increased tenderness in the area of supraspinatus and infraspinatus insertion site. Added side lying external rotation with good response. He only reported minimal discomfort with verbal and tactile cues to perform  within pain free range of motion. Continued verbal report from patient of crepitus during pendulum exercise. Minimal reports of discomfort during inferior glides of GH joint. Increased muscle guarding noted throughout treatment and PROM requiring tactile/verbal cues for patient to decrease contraction.     Amadeo Is progressing well towards his goals.   Patient prognosis is Fair.     Patient will continue to benefit from skilled outpatient physical therapy to address the deficits listed in the problem list box on initial evaluation, provide pt/family education and to maximize pt's level of independence in the home and community environment.     Patient's spiritual, cultural and educational needs considered and pt agreeable to plan of care and goals.     Anticipated barriers to physical therapy: Right rotator cuff tear     Goals:   Short Term Goals: 4 weeks      Pt will demonstrate knowledge and independence with HEP to continue with exercises outside of therapy to facilitate optimal overall improvements     Pt will improve functional score on shoulder FOTO by >10 points which indicates improved ability to perform daily tasks with less difficulty and pain     Reduce c/o pain in R shoulder to < 5/10 pain level with daily activities and sleep     Improve R shoulder PROM to 120 degrees of flexion, 105 degrees of abduction, 80 degrees of internal rotation, 40 degrees of external rotation to perform overhead reaching     Long Term Goals: 6 weeks      Pt will improve functional score on shoulder FOTO by >20 points which indicates improved ability to perform daily tasks with less difficulty and pain     Reduce c/o pain in R shoulder to < 3/10 pain level with daily activities and sleep     Improve R shoulder PROM to 140 degrees of flexion, 115 degrees of abduction, 90 degrees of internal rotation, 50 degrees of external rotation to perform overhead reaching     Pt get approved for R shoulder surgery       Plan     Plan of  care Certification: 2/12/24 to 3/22/24     Outpatient Physical Therapy 2 times weekly for 6 weeks to include the following interventions: Manual Therapy, Patient Education, Therapeutic Exercise, and pain management     Caro Finney PTA

## 2024-02-20 NOTE — TELEPHONE ENCOUNTER
Patient called for update on appeal.     Advised that the doctors are reviewing information prior to sending it to insurance. Advised that once I have a signature I will send it to the insurance.      Pt verbalized understanding and will call with any questions or concerns.

## 2024-02-21 ENCOUNTER — CLINICAL SUPPORT (OUTPATIENT)
Dept: REHABILITATION | Facility: HOSPITAL | Age: 49
End: 2024-02-21
Payer: COMMERCIAL

## 2024-02-21 DIAGNOSIS — M25.511 ACUTE PAIN OF RIGHT SHOULDER: ICD-10-CM

## 2024-02-21 DIAGNOSIS — M75.41 IMPINGEMENT SYNDROME OF RIGHT SHOULDER: ICD-10-CM

## 2024-02-21 DIAGNOSIS — R29.898 DECREASED STRENGTH OF UPPER EXTREMITY: ICD-10-CM

## 2024-02-21 DIAGNOSIS — S46.011A TRAUMATIC TEAR OF RIGHT ROTATOR CUFF, INITIAL ENCOUNTER: Primary | ICD-10-CM

## 2024-02-21 DIAGNOSIS — M25.611 DECREASED ROM OF RIGHT SHOULDER: ICD-10-CM

## 2024-02-21 DIAGNOSIS — S43.431A SUPERIOR GLENOID LABRUM LESION OF RIGHT SHOULDER, INITIAL ENCOUNTER: ICD-10-CM

## 2024-02-21 PROCEDURE — 97110 THERAPEUTIC EXERCISES: CPT | Mod: CQ

## 2024-02-21 NOTE — PROGRESS NOTES
[facility]   Physical Therapy Treatment Note      Name: Amadeo Jha  Clinic Number: [id number]    Therapy Diagnosis:   Encounter Diagnoses   Name Primary?    Traumatic tear of right rotator cuff, initial encounter Yes    Impingement syndrome of right shoulder     Superior glenoid labrum lesion of right shoulder, initial encounter     Acute pain of right shoulder     Decreased ROM of right shoulder     Decreased strength of upper extremity      Physician: Sloane Escobedo PA*    Visit Date: 2/21/2024    Physician Orders: PT Eval and Treat, 2 wk 4  Medical Diagnosis from Referral: S46.011A- Traumatic tear of Right Rotator Cuff, M75.41- Shoulder Impingement Syndrome, S43.431A- Superior Glenoid Labrum Lesion of R shoulder  Evaluation Date: 2/5/2024  Authorization Period Expiration: 3/22/2024  Plan of Care Expiration: 3/22/2024  Reassessment / Plan of Care Due: 2/26/24  Visit # / Visits authorized: 4/12       Functional shoulder FOTO score: 20 (2/5/24)     Precautions: Standard      Time In: 1030  Time Out: 1100  Total Appointment Time (timed & untimed codes): 30 minutes    PTA Visit #: 3/5       Subjective     Patient reports: he continues to have severe pain in R shoulder and is very frustrated about not being approved for surgery. He wants to continue to try therapy for shoulder before moving on to the order for his back.   He was not compliant with home exercise program.  Response to previous treatment: HEP- Fair   Functional change: none    Pain: 8-9/10  Location: right shoulder      Objective      -Performed passive RANGE OF MOTION to right shoulder   Painful in all directions.       Treatment     Amadeo received the treatments listed below:      therapeutic exercises to develop strength, ROM, flexibility, and posture for 30 minutes including:  Therapeutic Exercise Grid     Exercise 1  Exercise 2  Exercise 3  Exercise 4    Exercise :    Pendulum: CW  CCW  Forward / back  Medial /  lateral Table ROM: flexion  Scaption  ER    Scapular retraction Posterior shoulder shrugs   Repetition/Time :    20   10   10 x 5 sec   10     Resist or Assist :                  Comment :                 Done :    YES   YES    YES    YES                      Exercise 5  Exercise 6  Exercise 7  Exercise 8    Exercise :    Isometric: flexion  Extension  Adduction  Abduction  IR  ER   R shoulder PROM  - all planes   Low rows    Inferior glides      Repetition/Time :       10   10 x 5 sec   10 x 5 secs     Resist or Assist :                  Comment :    Unable due to pain               Done :       YES    YES    YES                       Exercise 9  Exercise 10  Exercise 11  Exercise 12    Exercise :    Right GH mobs    STM   Cryotherapy pack   Biceps curl     Repetition/Time :    12   5 mins   10 mins   2 x 10     Resist or Assist :             2#     Comment :    All planes    Pec minor/teres    R Anterior shoulder        Done :     YES     YES                       Exercise 13  Exercise 14 Exercise 15 Exercise 16    Exercise :    Triceps pres (L) sidelying ER-AROM     Repetition/Time :    2 x10 15     Resist or Assist :    Red TB      Comment :          Done :    no  yes                   cold pack for 10 minutes to R anterior shoulder .    Patient Education and Home Exercises       Education provided:   - Reviewed HEP and precautions. Discussed postioning for sleeping    Written Home Exercises Provided: Patient instructed to cont prior HEP. Exercises were reviewed and Amadeo was able to demonstrate them prior to the end of the session.  Amadeo demonstrated good  understanding of the education provided. See Electronic Medical Record under Patient Instructions for exercises provided during therapy sessions    Assessment     Patient completed all exercises with fair effort, and complaints of pain throughout session. Amadeo continues to exhibit guarding during exercises, and particularly during PROM and pendulums.  Crepitus noted during passive external rotation and internal rotation, with complaints of pain throughout motion.    Amadeo Is progressing well towards his goals.   Patient prognosis is Fair.     Patient will continue to benefit from skilled outpatient physical therapy to address the deficits listed in the problem list box on initial evaluation, provide pt/family education and to maximize pt's level of independence in the home and community environment.     Patient's spiritual, cultural and educational needs considered and pt agreeable to plan of care and goals.     Anticipated barriers to physical therapy: Right rotator cuff tear     Goals:   Short Term Goals: 4 weeks      Pt will demonstrate knowledge and independence with HEP to continue with exercises outside of therapy to facilitate optimal overall improvements     Pt will improve functional score on shoulder FOTO by >10 points which indicates improved ability to perform daily tasks with less difficulty and pain     Reduce c/o pain in R shoulder to < 5/10 pain level with daily activities and sleep     Improve R shoulder PROM to 120 degrees of flexion, 105 degrees of abduction, 80 degrees of internal rotation, 40 degrees of external rotation to perform overhead reaching     Long Term Goals: 6 weeks      Pt will improve functional score on shoulder FOTO by >20 points which indicates improved ability to perform daily tasks with less difficulty and pain     Reduce c/o pain in R shoulder to < 3/10 pain level with daily activities and sleep     Improve R shoulder PROM to 140 degrees of flexion, 115 degrees of abduction, 90 degrees of internal rotation, 50 degrees of external rotation to perform overhead reaching     Pt get approved for R shoulder surgery       Plan     Plan of care Certification: 2/12/24 to 3/22/24     Outpatient Physical Therapy 2 times weekly for 6 weeks to include the following interventions: Manual Therapy, Patient Education, Therapeutic Exercise,  and pain management     Noe Madison, PTA      no

## 2024-02-22 ENCOUNTER — TELEPHONE (OUTPATIENT)
Dept: ORTHOPEDICS | Facility: CLINIC | Age: 49
End: 2024-02-22
Payer: COMMERCIAL

## 2024-02-23 NOTE — TELEPHONE ENCOUNTER
Patient called for return call.     Called patient and he stated that he is unable to complete physical therapy due to pain. Informed patient that I did send his authorization, but I will add it to the chart that he is unable to do PT due to pain.     Advised that I would let you know the update on PT.     Pt verbalized understanding and will call with any questions or concerns.

## 2024-02-27 ENCOUNTER — OFFICE VISIT (OUTPATIENT)
Dept: ORTHOPEDICS | Facility: CLINIC | Age: 49
End: 2024-02-27
Payer: COMMERCIAL

## 2024-02-27 ENCOUNTER — TELEPHONE (OUTPATIENT)
Dept: ORTHOPEDICS | Facility: CLINIC | Age: 49
End: 2024-02-27

## 2024-02-27 VITALS
HEIGHT: 72 IN | SYSTOLIC BLOOD PRESSURE: 149 MMHG | BODY MASS INDEX: 34.27 KG/M2 | WEIGHT: 253 LBS | HEART RATE: 102 BPM | DIASTOLIC BLOOD PRESSURE: 106 MMHG

## 2024-02-27 DIAGNOSIS — Z01.818 PREOP TESTING: ICD-10-CM

## 2024-02-27 DIAGNOSIS — S43.431A SUPERIOR GLENOID LABRUM LESION OF RIGHT SHOULDER, INITIAL ENCOUNTER: ICD-10-CM

## 2024-02-27 DIAGNOSIS — M75.41 SHOULDER IMPINGEMENT SYNDROME, RIGHT: ICD-10-CM

## 2024-02-27 DIAGNOSIS — S46.011A TRAUMATIC TEAR OF RIGHT ROTATOR CUFF, UNSPECIFIED TEAR EXTENT, INITIAL ENCOUNTER: Primary | ICD-10-CM

## 2024-02-27 PROCEDURE — 99213 OFFICE O/P EST LOW 20 MIN: CPT | Mod: ,,,

## 2024-02-27 PROCEDURE — 1159F MED LIST DOCD IN RCRD: CPT | Mod: CPTII,,,

## 2024-02-27 PROCEDURE — 3080F DIAST BP >= 90 MM HG: CPT | Mod: CPTII,,,

## 2024-02-27 PROCEDURE — 3077F SYST BP >= 140 MM HG: CPT | Mod: CPTII,,,

## 2024-02-27 PROCEDURE — 1160F RVW MEDS BY RX/DR IN RCRD: CPT | Mod: CPTII,,,

## 2024-02-27 PROCEDURE — 3008F BODY MASS INDEX DOCD: CPT | Mod: CPTII,,,

## 2024-02-27 RX ORDER — CELECOXIB 200 MG/1
200 CAPSULE ORAL DAILY
Qty: 30 CAPSULE | Refills: 0 | Status: SHIPPED | OUTPATIENT
Start: 2024-02-27

## 2024-02-27 NOTE — PROGRESS NOTES
Subjective:    CC: Pain of the Right Shoulder (R shoulder pain - pt is unable to complete PT due to pain in his shoulder. )       HPI:  Patient presents to clinic for repeat evaluation of right shoulder.  He is attended approximately 4 weeks of physical therapy and feels he is not progressing.  He states that therapy causes him significant pain and does not feel his range of motion is improving.  Currently taking Celebrex daily.  He rates his pain today as a 7/10 but states it as a 10/10 at its worse and particularly after physical therapy.  He has received a subacromial steroid shot in the past without any relief.  He has tried rest, ice, medication, exercise program, and injection all without significant relief.    ROS: Refer to HPI for pertinent ROS. All other 12 point systems negative.    Objective:    Vitals:    02/27/24 0755   BP: (!) 149/106   Pulse: 102        Physical Exam:  Patient is well-nourished and well-developed, in no apparent distress, pleasant and cooperative. Examination of the right upper extremity compartments are soft and warm.  Skin is intact. There are no signs or symptoms of DVT or infection.   Patient is tender to palpation along the  anterior lateral shoulder as well as the anterior shoulder about the biceps tendon region .  Patient is able to forward flex and abduct to 90° actively; 110° passively with discomfort.  Positive Michel and Neers, positive empty can, unable to perform drop arm test secondary to pain and patient hesitancy.  Positive [place] test. Negative sulcus sign. Stable to stressing. Neurovascularly intact distally.    Images:  Previous MRI Images Reviewed and discussed with patient.    Assessment:  1. Traumatic tear of right rotator cuff, unspecified tear extent, initial encounter  - celecoxib (CELEBREX) 200 MG capsule; Take 1 capsule (200 mg total) by mouth once daily.  Dispense: 30 capsule; Refill: 0    2. Shoulder impingement syndrome, right    3. Superior glenoid  labrum lesion of right shoulder, initial encounter       Plan:  Physical exam and previous imaging findings again reviewed and discussed with patient.  We continue to work towards getting his surgery approved by his insurance.  In the meantime continue to preserve range of motion as best as possible.  Refrain from heavy lifting.  Refill of Celebrex given with appropriate precautions.  We will plan to resubmit case request for right shoulder arthroscopy, debridement, possible biceps tenotomy, mini open rotator cuff repair with subacromial decompression.    Follow up: No follow-ups on file.

## 2024-02-28 ENCOUNTER — TELEPHONE (OUTPATIENT)
Dept: PREADMISSION TESTING | Facility: HOSPITAL | Age: 49
End: 2024-02-28
Payer: COMMERCIAL

## 2024-02-28 RX ORDER — SODIUM CHLORIDE, SODIUM GLUCONATE, SODIUM ACETATE, POTASSIUM CHLORIDE AND MAGNESIUM CHLORIDE 30; 37; 368; 526; 502 MG/100ML; MG/100ML; MG/100ML; MG/100ML; MG/100ML
INJECTION, SOLUTION INTRAVENOUS CONTINUOUS
Status: CANCELLED | OUTPATIENT
Start: 2024-02-28

## 2024-02-28 NOTE — TELEPHONE ENCOUNTER
Could you please reach out to Mr Jha?  He has questions about his insurance and location of surgery.  He stated he would like this surgery to be done in [place].

## 2024-03-04 ENCOUNTER — DOCUMENTATION ONLY (OUTPATIENT)
Dept: REHABILITATION | Facility: HOSPITAL | Age: 49
End: 2024-03-04
Payer: COMMERCIAL

## 2024-03-04 NOTE — PROGRESS NOTES
[facility]  Physical Therapy Discharge Note    Name: Amadeo Jha  Clinic Number: [id number]    Medical diagnosis:  S46.011A- Traumatic tear of Right Rotator Cuff, M75.41- Shoulder Impingement Syndrome, S43.431A- Superior Glenoid Labrum Lesion of R shoulder   PT diagnosis: R shoulder pain, decreased R shoulder ROM, strength, limited use of R UE    Date of Last visit: 2/21/24  Total Visits Received: 4 plus initial eval    Assessment      Amadeo Jha did not return to physical therapy today for final assessment for discharge.  Amadeo goals were address as listed below and met as stated.  Amadeo was issued a home exercise program with handouts throughout this episode of care to reference for continued wellness and physical fitness . Contact information was given at evaluation in case any questions arise in the future or if therapy is needed.    Discharge reason: patient did not return for formal physical therapy. Pt states PT is hurting his shoulder too much and he is waiting to get approved for R shoulder surgery     Plan   This patient is discharged from Physical Therapy.

## 2024-03-05 ENCOUNTER — TELEPHONE (OUTPATIENT)
Dept: ORTHOPEDICS | Facility: CLINIC | Age: 49
End: 2024-03-05
Payer: COMMERCIAL

## 2024-03-05 NOTE — TELEPHONE ENCOUNTER
Patient called in regards to his surgery.     Called patient and advised that his surgery is still pending. Advised that I would talk with the doctors tomorrow to see what the next steps are. Message was sent to [facility] for update on his surgery.           Email sent to prior auth as well:

## 2024-03-07 ENCOUNTER — PATIENT MESSAGE (OUTPATIENT)
Dept: ADMINISTRATIVE | Facility: OTHER | Age: 49
End: 2024-03-07
Payer: COMMERCIAL

## 2024-03-08 ENCOUNTER — TELEPHONE (OUTPATIENT)
Dept: ORTHOPEDICS | Facility: CLINIC | Age: 49
End: 2024-03-08
Payer: COMMERCIAL

## 2024-03-08 DIAGNOSIS — S43.431A SUPERIOR GLENOID LABRUM LESION OF RIGHT SHOULDER, INITIAL ENCOUNTER: Primary | ICD-10-CM

## 2024-03-08 DIAGNOSIS — S46.011A TRAUMATIC TEAR OF RIGHT ROTATOR CUFF, UNSPECIFIED TEAR EXTENT, INITIAL ENCOUNTER: ICD-10-CM

## 2024-03-08 DIAGNOSIS — M75.41 SHOULDER IMPINGEMENT SYNDROME, RIGHT: ICD-10-CM

## 2024-03-08 NOTE — TELEPHONE ENCOUNTER
Attempted to call patient to see if he sees a cardiologist. Per anesthesia, he needs cardio clearance before proceeding with surgery.     No answer. Left vm.

## 2024-03-08 NOTE — TELEPHONE ENCOUNTER
Patient returned call. He states that he does not have a cardiologist.     Spoke to patient and advised that I would send a referral to Dr. Ruggiero and david it [facility] along with clearance letter so we can get him in ASAP.     Advised that once we have his clearance we can get him back on the books, patient states that he would like to have his surgery in [place] if possible when we reschedule him.

## 2024-03-15 ENCOUNTER — TELEPHONE (OUTPATIENT)
Dept: ORTHOPEDICS | Facility: CLINIC | Age: 49
End: 2024-03-15
Payer: COMMERCIAL

## 2024-03-15 NOTE — TELEPHONE ENCOUNTER
Patient called to see if we received his calcium test from cardiology.     Called patient and advised that we do not have those results he would have to call his cardiologist office.     Pt verbalized understanding and will call with any questions or concerns.

## 2024-03-22 ENCOUNTER — TELEPHONE (OUTPATIENT)
Dept: ORTHOPEDICS | Facility: CLINIC | Age: 49
End: 2024-03-22
Payer: COMMERCIAL

## 2024-03-22 NOTE — TELEPHONE ENCOUNTER
Patient called stating that he is cleared for surgery.     Advised that as soon as I have his clearance. We can get him back on the surgery book.     Pt verbalized understanding and will call with any questions or concerns.

## 2024-03-22 NOTE — TELEPHONE ENCOUNTER
Patient called to see if we have gotten any results from his cardiology clearance.     Called patient and advised that we have not gotten anything, but I will resend a clearance request to see if that gets the ball rolling.     Pt verbalized understanding and will call with any questions or concerns.

## 2024-03-26 ENCOUNTER — ANESTHESIA EVENT (OUTPATIENT)
Dept: SURGERY | Facility: HOSPITAL | Age: 49
End: 2024-03-26
Payer: COMMERCIAL

## 2024-03-26 DIAGNOSIS — S46.011A TRAUMATIC TEAR OF RIGHT ROTATOR CUFF, UNSPECIFIED TEAR EXTENT, INITIAL ENCOUNTER: ICD-10-CM

## 2024-03-26 DIAGNOSIS — M75.101 UNSPECIFIED ROTATOR CUFF TEAR OR RUPTURE OF RIGHT SHOULDER, NOT SPECIFIED AS TRAUMATIC: ICD-10-CM

## 2024-03-26 DIAGNOSIS — S43.431A SUPERIOR GLENOID LABRUM LESION OF RIGHT SHOULDER, INITIAL ENCOUNTER: Primary | ICD-10-CM

## 2024-03-26 DIAGNOSIS — M75.41 SHOULDER IMPINGEMENT SYNDROME, RIGHT: ICD-10-CM

## 2024-03-26 DIAGNOSIS — Z01.818 PREOP TESTING: ICD-10-CM

## 2024-03-26 RX ORDER — SODIUM CHLORIDE, SODIUM GLUCONATE, SODIUM ACETATE, POTASSIUM CHLORIDE AND MAGNESIUM CHLORIDE 30; 37; 368; 526; 502 MG/100ML; MG/100ML; MG/100ML; MG/100ML; MG/100ML
INJECTION, SOLUTION INTRAVENOUS CONTINUOUS
Status: CANCELLED | OUTPATIENT
Start: 2024-03-26

## 2024-03-26 NOTE — H&P
Admission History & Physical    Subjective:    CC: Pain of the Right Shoulder (R shoulder pain - pt is unable to complete PT due to pain in his shoulder. )       HPI:  Amadeo Jha presents today for preoperative evaluation for right shoulder ATS, debridement, biceps tenotomy, mini-open RTC repair with SAD. I reviewed the indications for surgery. The risks and benefits of the proposed and alternative treatments were discussed with the patient. Questions pertinent to the procedure were solicited and answered. Patient was given instruction to call clinic with any further questions.No assurances were given. Informed consent was obtained. The patient expressed good understanding and wished to proceed with scheduling the procedure.     ROS:   Constitutional: No fever, weakness, or fatigue.   Ear/Nose/Mouth/Throat: No nasal congestion or sore throat.   Respiratory: No shortness of breath or cough.   Cardiovascular: No chest pain, palpitations, or peripheral edema.   Gastrointestinal: No nausea, vomiting, or abdominal pain.   Genitourinary: No dysuria.  Musculoskeletal: Right shoulder pain, swelling, loss of motion.     Past Surgical History:   Procedure Laterality Date    CERVICAL FUSION      EXCISION, NASAL TURBINATE, SUBMUCOSAL Bilateral 12/19/2023    Procedure: EXCISION, NASAL TURBINATE, SUBMUCOSAL;  Surgeon: Olayinka Harper MD;  Location: [facility] OR;  Service: ENT;  Laterality: Bilateral;    KNEE ARTHROSCOPY W/ ACL RECONSTRUCTION Right     left elbow      NASAL SEPTOPLASTY N/A 12/19/2023    Procedure: SEPTOPLASTY, NOSE;  Surgeon: Olayinka Harper MD;  Location: [facility] OR;  Service: ENT;  Laterality: N/A;    REPAIR OF LIGAMENT OF WRIST Right 7/25/2022    Procedure: REPAIR, LIGAMENT, WRIST;  Surgeon: Garry Gray MD;  Location: [facility] OR;  Service: Orthopedics;  Laterality: Right;        Past Medical History:   Diagnosis Date    ADD (attention deficit disorder)     Deviated nasal septum     Hypertension     PINKY  (obstructive sleep apnea)     no cpap    Pain, joint, shoulder     Painful swallowing     Traumatic tear of right rotator cuff         Objective:    Vitals:    02/27/24 0755   BP: (!) 149/106   Pulse: 102        Physical Exam:    Appearance: No distress, good color on room air. Alert and cooperative.  HEENT: Normocephalic. PERRLA EOM intact.   Lungs: Breathing unlabored.  Heart: Regular rate and rhythm.  Abdomen: Soft, non-tender.  No rebound tenderness.  Extremities: Examination of the right upper extremity compartments are soft and warm.  Skin is intact. There are no signs or symptoms of DVT or infection.   Patient is tender to palpation along the  anterior lateral shoulder as well as the anterior shoulder about the biceps tendon region .  Patient is able to forward flex and abduct to 90° actively; 110° passively with discomfort.  Positive Michel and Neers, positive empty can, unable to perform drop arm test secondary to pain and patient hesitancy.  Positive [place] test. Negative sulcus sign. Stable to stressing. Neurovascularly intact distally.   Skin: No rashes or open wounds.        Assessment:  1. Traumatic tear of right rotator cuff, unspecified tear extent, initial encounter  - celecoxib (CELEBREX) 200 MG capsule; Take 1 capsule (200 mg total) by mouth once daily.  Dispense: 30 capsule; Refill: 0  - Case Request Operating Room: DEBRIDEMENT, SHOULDER, ARTHROSCOPIC, REPAIR, ROTATOR CUFF, ARTHROSCOPIC    2. Shoulder impingement syndrome, right  - Case Request Operating Room: DEBRIDEMENT, SHOULDER, ARTHROSCOPIC, REPAIR, ROTATOR CUFF, ARTHROSCOPIC    3. Superior glenoid labrum lesion of right shoulder, initial encounter    4. Preop testing  - Case Request Operating Room: DEBRIDEMENT, SHOULDER, ARTHROSCOPIC, REPAIR, ROTATOR CUFF, ARTHROSCOPIC       Plan:  Plan for right shoulder ATS, debridement, biceps tenotomy, mini-open RTC repair with SAD. The patient has been given preoperative instructions. Post-operative  appointment is scheduled for 2 weeks.

## 2024-03-26 NOTE — ANESTHESIA PREPROCEDURE EVALUATION
03/26/2024  Amadeo Jha is a 48 y.o., male.      Pre-op Assessment    I have reviewed the Patient Summary Reports.     I have reviewed the Nursing Notes. I have reviewed the NPO Status.   I have reviewed the Medications.     Review of Systems  Anesthesia Hx:  No problems with previous Anesthesia  Documented difficult intubation requiring Glidescope History of prior surgery of interest to airway management or planning: cervical fusion.            Hematology/Oncology:  Hematology Normal   Oncology Normal                                   EENT/Dental:  EENT/Dental Normal  Eyes:                 Nasal Problems:  Recent septoplasty  Throat Disease:        Cardiovascular:  Exercise tolerance: good   Hypertension           hyperlipidemia                       Hypertension         Pulmonary:        Sleep Apnea, CPAP     Obstructive Sleep Apnea (PINKY), CPAP used.           Renal/:  Renal/ Normal                 Hepatic/GI:  Hepatic/GI Normal                 Musculoskeletal:     Right shoulder pain Musculoskeletal General/Symptoms: joint pain. Functional capacity is unlimited. Right shoulder pain           Neurological:  Neurology Normal                                      Endocrine:  Endocrine Normal            Psych:  Psychiatric History     Attention Deficit Disorder.           Physical Exam  General: Well nourished, Cooperative, Alert and Oriented    Airway:  Mallampati: III   Mouth Opening: Normal  TM Distance: Normal  Tongue: Normal  Neck ROM: Flexion Decreased, Extension Decreased    Dental:  Intact    Chest/Lungs:  Clear to auscultation, Normal Respiratory Rate    Heart:  Rate: Normal  Rhythm: Regular Rhythm    Musculoskeletal:  Right shoulder pain      Anesthesia Plan  Type of Anesthesia, risks & benefits discussed:    Anesthesia Type: Gen ETT, Regional  Intra-op Monitoring Plan: Standard ASA  Monitors  Post Op Pain Control Plan: multimodal analgesia, IV/PO Opioids PRN and peripheral nerve block  Induction:  IV  Airway Plan: Video, Post-Induction  Informed Consent: Informed consent signed with the Patient and all parties understand the risks and agree with anesthesia plan.  All questions answered. Patient consented to blood products? No  ASA Score: 2  Day of Surgery Review of History & Physical: H&P Update referred to the surgeon/provider.I have interviewed and examined the patient. I have reviewed the patient's H&P dated: 3/27/24. There are no significant changes.   Anesthesia Plan Notes: Documented difficult with Glidescope required.    Ready For Surgery From Anesthesia Perspective.     .

## 2024-03-26 NOTE — H&P (VIEW-ONLY)
Admission History & Physical    Subjective:    CC: Pain of the Right Shoulder (R shoulder pain - pt is unable to complete PT due to pain in his shoulder. )       HPI:  Amadeo Jha presents today for preoperative evaluation for right shoulder ATS, debridement, biceps tenotomy, mini-open RTC repair with SAD. I reviewed the indications for surgery. The risks and benefits of the proposed and alternative treatments were discussed with the patient. Questions pertinent to the procedure were solicited and answered. Patient was given instruction to call clinic with any further questions.No assurances were given. Informed consent was obtained. The patient expressed good understanding and wished to proceed with scheduling the procedure.     ROS:   Constitutional: No fever, weakness, or fatigue.   Ear/Nose/Mouth/Throat: No nasal congestion or sore throat.   Respiratory: No shortness of breath or cough.   Cardiovascular: No chest pain, palpitations, or peripheral edema.   Gastrointestinal: No nausea, vomiting, or abdominal pain.   Genitourinary: No dysuria.  Musculoskeletal: Right shoulder pain, swelling, loss of motion.     Past Surgical History:   Procedure Laterality Date    CERVICAL FUSION      EXCISION, NASAL TURBINATE, SUBMUCOSAL Bilateral 12/19/2023    Procedure: EXCISION, NASAL TURBINATE, SUBMUCOSAL;  Surgeon: Olayinka Harper MD;  Location: [facility] OR;  Service: ENT;  Laterality: Bilateral;    KNEE ARTHROSCOPY W/ ACL RECONSTRUCTION Right     left elbow      NASAL SEPTOPLASTY N/A 12/19/2023    Procedure: SEPTOPLASTY, NOSE;  Surgeon: Olayinka Harper MD;  Location: [facility] OR;  Service: ENT;  Laterality: N/A;    REPAIR OF LIGAMENT OF WRIST Right 7/25/2022    Procedure: REPAIR, LIGAMENT, WRIST;  Surgeon: Garry Gray MD;  Location: [facility] OR;  Service: Orthopedics;  Laterality: Right;        Past Medical History:   Diagnosis Date    ADD (attention deficit disorder)     Deviated nasal septum     Hypertension     PINKY  (obstructive sleep apnea)     no cpap    Pain, joint, shoulder     Painful swallowing     Traumatic tear of right rotator cuff         Objective:    Vitals:    02/27/24 0755   BP: (!) 149/106   Pulse: 102        Physical Exam:    Appearance: No distress, good color on room air. Alert and cooperative.  HEENT: Normocephalic. PERRLA EOM intact.   Lungs: Breathing unlabored.  Heart: Regular rate and rhythm.  Abdomen: Soft, non-tender.  No rebound tenderness.  Extremities: Examination of the right upper extremity compartments are soft and warm.  Skin is intact. There are no signs or symptoms of DVT or infection.   Patient is tender to palpation along the  anterior lateral shoulder as well as the anterior shoulder about the biceps tendon region .  Patient is able to forward flex and abduct to 90° actively; 110° passively with discomfort.  Positive Michel and Neers, positive empty can, unable to perform drop arm test secondary to pain and patient hesitancy.  Positive [place] test. Negative sulcus sign. Stable to stressing. Neurovascularly intact distally.   Skin: No rashes or open wounds.        Assessment:  1. Traumatic tear of right rotator cuff, unspecified tear extent, initial encounter  - celecoxib (CELEBREX) 200 MG capsule; Take 1 capsule (200 mg total) by mouth once daily.  Dispense: 30 capsule; Refill: 0  - Case Request Operating Room: DEBRIDEMENT, SHOULDER, ARTHROSCOPIC, REPAIR, ROTATOR CUFF, ARTHROSCOPIC    2. Shoulder impingement syndrome, right  - Case Request Operating Room: DEBRIDEMENT, SHOULDER, ARTHROSCOPIC, REPAIR, ROTATOR CUFF, ARTHROSCOPIC    3. Superior glenoid labrum lesion of right shoulder, initial encounter    4. Preop testing  - Case Request Operating Room: DEBRIDEMENT, SHOULDER, ARTHROSCOPIC, REPAIR, ROTATOR CUFF, ARTHROSCOPIC       Plan:  Plan for right shoulder ATS, debridement, biceps tenotomy, mini-open RTC repair with SAD. The patient has been given preoperative instructions. Post-operative  appointment is scheduled for 2 weeks.

## 2024-03-27 ENCOUNTER — HOSPITAL ENCOUNTER (OUTPATIENT)
Facility: HOSPITAL | Age: 49
Discharge: HOME | End: 2024-03-27
Attending: ORTHOPAEDIC SURGERY | Admitting: ORTHOPAEDIC SURGERY
Payer: COMMERCIAL

## 2024-03-27 ENCOUNTER — ANESTHESIA (OUTPATIENT)
Dept: SURGERY | Facility: HOSPITAL | Age: 49
End: 2024-03-27
Payer: COMMERCIAL

## 2024-03-27 DIAGNOSIS — Z01.818 PREOP TESTING: ICD-10-CM

## 2024-03-27 DIAGNOSIS — S46.011A TRAUMATIC TEAR OF RIGHT ROTATOR CUFF, UNSPECIFIED TEAR EXTENT, INITIAL ENCOUNTER: ICD-10-CM

## 2024-03-27 DIAGNOSIS — M75.101 UNSPECIFIED ROTATOR CUFF TEAR OR RUPTURE OF RIGHT SHOULDER, NOT SPECIFIED AS TRAUMATIC: ICD-10-CM

## 2024-03-27 DIAGNOSIS — M75.41 SHOULDER IMPINGEMENT SYNDROME, RIGHT: ICD-10-CM

## 2024-03-27 DIAGNOSIS — S43.431A SUPERIOR GLENOID LABRUM LESION OF RIGHT SHOULDER, INITIAL ENCOUNTER: ICD-10-CM

## 2024-03-27 PROCEDURE — 63600175 PHARM REV CODE 636 W HCPCS: Performed by: ORTHOPAEDIC SURGERY

## 2024-03-27 PROCEDURE — 63600175 PHARM REV CODE 636 W HCPCS: Performed by: NURSE ANESTHETIST, CERTIFIED REGISTERED

## 2024-03-27 PROCEDURE — 37000009 HC ANESTHESIA EA ADD 15 MINS: Performed by: ORTHOPAEDIC SURGERY

## 2024-03-27 PROCEDURE — 25000003 PHARM REV CODE 250: Performed by: ORTHOPAEDIC SURGERY

## 2024-03-27 PROCEDURE — 27201423 OPTIME MED/SURG SUP & DEVICES STERILE SUPPLY: Performed by: ORTHOPAEDIC SURGERY

## 2024-03-27 PROCEDURE — 36000710: Performed by: ORTHOPAEDIC SURGERY

## 2024-03-27 PROCEDURE — D9220A PRA ANESTHESIA: Mod: ,,, | Performed by: NURSE ANESTHETIST, CERTIFIED REGISTERED

## 2024-03-27 PROCEDURE — 23410 REPAIR ROTATOR CUFF ACUTE: CPT | Mod: RT,,, | Performed by: ORTHOPAEDIC SURGERY

## 2024-03-27 PROCEDURE — 37000008 HC ANESTHESIA 1ST 15 MINUTES: Performed by: ORTHOPAEDIC SURGERY

## 2024-03-27 PROCEDURE — C1889 IMPLANT/INSERT DEVICE, NOC: HCPCS | Performed by: ORTHOPAEDIC SURGERY

## 2024-03-27 PROCEDURE — 71000033 HC RECOVERY, INTIAL HOUR: Performed by: ORTHOPAEDIC SURGERY

## 2024-03-27 PROCEDURE — 64415 NJX AA&/STRD BRCH PLXS IMG: CPT | Performed by: NURSE ANESTHETIST, CERTIFIED REGISTERED

## 2024-03-27 PROCEDURE — 29822 SHO ARTHRS SRG LMTD DBRDMT: CPT | Mod: 59,51,RT, | Performed by: ORTHOPAEDIC SURGERY

## 2024-03-27 PROCEDURE — C1713 ANCHOR/SCREW BN/BN,TIS/BN: HCPCS | Performed by: ORTHOPAEDIC SURGERY

## 2024-03-27 PROCEDURE — 36000711: Performed by: ORTHOPAEDIC SURGERY

## 2024-03-27 PROCEDURE — 71000015 HC POSTOP RECOV 1ST HR: Performed by: ORTHOPAEDIC SURGERY

## 2024-03-27 PROCEDURE — 25000003 PHARM REV CODE 250: Performed by: NURSE ANESTHETIST, CERTIFIED REGISTERED

## 2024-03-27 DEVICE — IMPLANTABLE DEVICE
Type: IMPLANTABLE DEVICE | Site: SHOULDER | Status: FUNCTIONAL
Brand: VENTIX® LINK KNOTLESS ANCHOR

## 2024-03-27 RX ORDER — PHENYLEPHRINE HYDROCHLORIDE 10 MG/ML
INJECTION INTRAVENOUS
Status: DISCONTINUED | OUTPATIENT
Start: 2024-03-27 | End: 2024-03-27

## 2024-03-27 RX ORDER — HYDROCODONE BITARTRATE AND ACETAMINOPHEN 5; 325 MG/1; MG/1
1 TABLET ORAL
Status: DISCONTINUED | OUTPATIENT
Start: 2024-03-27 | End: 2024-03-27 | Stop reason: HOSPADM

## 2024-03-27 RX ORDER — CALCIUM CARBONATE 200(500)MG
500 TABLET,CHEWABLE ORAL 3 TIMES DAILY PRN
Status: DISCONTINUED | OUTPATIENT
Start: 2024-03-27 | End: 2024-03-27 | Stop reason: HOSPADM

## 2024-03-27 RX ORDER — ALUMINUM HYDROXIDE, MAGNESIUM HYDROXIDE, AND SIMETHICONE 1200; 120; 1200 MG/30ML; MG/30ML; MG/30ML
30 SUSPENSION ORAL EVERY 6 HOURS PRN
Status: DISCONTINUED | OUTPATIENT
Start: 2024-03-27 | End: 2024-03-27 | Stop reason: HOSPADM

## 2024-03-27 RX ORDER — ONDANSETRON HYDROCHLORIDE 2 MG/ML
4 INJECTION, SOLUTION INTRAVENOUS DAILY PRN
Status: DISCONTINUED | OUTPATIENT
Start: 2024-03-27 | End: 2024-03-27 | Stop reason: HOSPADM

## 2024-03-27 RX ORDER — EPINEPHRINE 1 MG/ML
INJECTION INTRAMUSCULAR; INTRAVENOUS; SUBCUTANEOUS
Status: DISCONTINUED | OUTPATIENT
Start: 2024-03-27 | End: 2024-03-27 | Stop reason: HOSPADM

## 2024-03-27 RX ORDER — FENTANYL CITRATE 50 UG/ML
50 INJECTION, SOLUTION INTRAMUSCULAR; INTRAVENOUS
Status: DISCONTINUED | OUTPATIENT
Start: 2024-03-27 | End: 2024-03-27 | Stop reason: HOSPADM

## 2024-03-27 RX ORDER — OXYCODONE AND ACETAMINOPHEN 10; 325 MG/1; MG/1
1 TABLET ORAL EVERY 8 HOURS PRN
Qty: 21 TABLET | Refills: 0 | Status: SHIPPED | OUTPATIENT
Start: 2024-03-27 | End: 2024-04-01 | Stop reason: SDUPTHER

## 2024-03-27 RX ORDER — SODIUM CHLORIDE 9 MG/ML
INJECTION, SOLUTION INTRAVENOUS CONTINUOUS
Status: DISCONTINUED | OUTPATIENT
Start: 2024-03-27 | End: 2024-03-27 | Stop reason: HOSPADM

## 2024-03-27 RX ORDER — HYDROCODONE BITARTRATE AND ACETAMINOPHEN 5; 325 MG/1; MG/1
1 TABLET ORAL EVERY 4 HOURS PRN
Status: DISCONTINUED | OUTPATIENT
Start: 2024-03-27 | End: 2024-03-27 | Stop reason: HOSPADM

## 2024-03-27 RX ORDER — SUCCINYLCHOLINE CHLORIDE 20 MG/ML
INJECTION INTRAMUSCULAR; INTRAVENOUS
Status: DISCONTINUED | OUTPATIENT
Start: 2024-03-27 | End: 2024-03-27

## 2024-03-27 RX ORDER — EPHEDRINE SULFATE 50 MG/ML
INJECTION, SOLUTION INTRAVENOUS
Status: DISCONTINUED | OUTPATIENT
Start: 2024-03-27 | End: 2024-03-27

## 2024-03-27 RX ORDER — ONDANSETRON HYDROCHLORIDE 2 MG/ML
INJECTION, SOLUTION INTRAVENOUS
Status: DISCONTINUED | OUTPATIENT
Start: 2024-03-27 | End: 2024-03-27

## 2024-03-27 RX ORDER — LIDOCAINE HYDROCHLORIDE 20 MG/ML
INJECTION INTRAVENOUS
Status: DISCONTINUED | OUTPATIENT
Start: 2024-03-27 | End: 2024-03-27

## 2024-03-27 RX ORDER — METOCLOPRAMIDE HYDROCHLORIDE 5 MG/ML
10 INJECTION INTRAMUSCULAR; INTRAVENOUS EVERY 6 HOURS PRN
Status: DISCONTINUED | OUTPATIENT
Start: 2024-03-27 | End: 2024-03-27 | Stop reason: HOSPADM

## 2024-03-27 RX ORDER — EPINEPHRINE 0.1 MG/ML
INJECTION INTRAVENOUS
Status: DISCONTINUED | OUTPATIENT
Start: 2024-03-27 | End: 2024-03-27

## 2024-03-27 RX ORDER — ONDANSETRON HYDROCHLORIDE 2 MG/ML
4 INJECTION, SOLUTION INTRAVENOUS EVERY 6 HOURS PRN
Status: DISCONTINUED | OUTPATIENT
Start: 2024-03-27 | End: 2024-03-27 | Stop reason: HOSPADM

## 2024-03-27 RX ORDER — MUPIROCIN 20 MG/G
OINTMENT TOPICAL 2 TIMES DAILY
Status: DISCONTINUED | OUTPATIENT
Start: 2024-03-27 | End: 2024-03-27 | Stop reason: HOSPADM

## 2024-03-27 RX ORDER — FENTANYL CITRATE 50 UG/ML
25 INJECTION, SOLUTION INTRAMUSCULAR; INTRAVENOUS EVERY 5 MIN PRN
Status: DISCONTINUED | OUTPATIENT
Start: 2024-03-27 | End: 2024-03-27 | Stop reason: HOSPADM

## 2024-03-27 RX ORDER — ROPIVACAINE HYDROCHLORIDE 5 MG/ML
INJECTION, SOLUTION EPIDURAL; INFILTRATION; PERINEURAL
Status: COMPLETED | OUTPATIENT
Start: 2024-03-27 | End: 2024-03-27

## 2024-03-27 RX ORDER — MEPERIDINE HYDROCHLORIDE 25 MG/ML
12.5 INJECTION INTRAMUSCULAR; INTRAVENOUS; SUBCUTANEOUS ONCE AS NEEDED
Status: DISCONTINUED | OUTPATIENT
Start: 2024-03-27 | End: 2024-03-27 | Stop reason: HOSPADM

## 2024-03-27 RX ORDER — MORPHINE SULFATE 4 MG/ML
4 INJECTION, SOLUTION INTRAMUSCULAR; INTRAVENOUS
Status: DISCONTINUED | OUTPATIENT
Start: 2024-03-27 | End: 2024-03-27 | Stop reason: HOSPADM

## 2024-03-27 RX ORDER — MIDAZOLAM HYDROCHLORIDE 1 MG/ML
1 INJECTION INTRAMUSCULAR; INTRAVENOUS
Status: DISCONTINUED | OUTPATIENT
Start: 2024-03-27 | End: 2024-03-27 | Stop reason: HOSPADM

## 2024-03-27 RX ORDER — FENTANYL CITRATE 50 UG/ML
25 INJECTION, SOLUTION INTRAMUSCULAR; INTRAVENOUS
Status: DISCONTINUED | OUTPATIENT
Start: 2024-03-27 | End: 2024-03-27

## 2024-03-27 RX ORDER — PROPOFOL 10 MG/ML
VIAL (ML) INTRAVENOUS
Status: DISCONTINUED | OUTPATIENT
Start: 2024-03-27 | End: 2024-03-27

## 2024-03-27 RX ORDER — SODIUM CHLORIDE, SODIUM GLUCONATE, SODIUM ACETATE, POTASSIUM CHLORIDE AND MAGNESIUM CHLORIDE 30; 37; 368; 526; 502 MG/100ML; MG/100ML; MG/100ML; MG/100ML; MG/100ML
INJECTION, SOLUTION INTRAVENOUS CONTINUOUS
Status: DISCONTINUED | OUTPATIENT
Start: 2024-03-27 | End: 2024-03-27 | Stop reason: HOSPADM

## 2024-03-27 RX ORDER — ROCURONIUM BROMIDE 10 MG/ML
INJECTION, SOLUTION INTRAVENOUS
Status: DISCONTINUED | OUTPATIENT
Start: 2024-03-27 | End: 2024-03-27

## 2024-03-27 RX ORDER — ROPIVACAINE HYDROCHLORIDE 5 MG/ML
30 INJECTION, SOLUTION EPIDURAL; INFILTRATION; PERINEURAL ONCE
Status: COMPLETED | OUTPATIENT
Start: 2024-03-27 | End: 2024-03-27

## 2024-03-27 RX ORDER — DEXAMETHASONE SODIUM PHOSPHATE 4 MG/ML
INJECTION, SOLUTION INTRA-ARTICULAR; INTRALESIONAL; INTRAMUSCULAR; INTRAVENOUS; SOFT TISSUE
Status: DISCONTINUED | OUTPATIENT
Start: 2024-03-27 | End: 2024-03-27

## 2024-03-27 RX ORDER — DIPHENHYDRAMINE HYDROCHLORIDE 50 MG/ML
12.5 INJECTION INTRAMUSCULAR; INTRAVENOUS ONCE AS NEEDED
Status: DISCONTINUED | OUTPATIENT
Start: 2024-03-27 | End: 2024-03-27 | Stop reason: HOSPADM

## 2024-03-27 RX ORDER — METHOCARBAMOL 500 MG/1
500 TABLET, FILM COATED ORAL EVERY 6 HOURS PRN
Status: DISCONTINUED | OUTPATIENT
Start: 2024-03-27 | End: 2024-03-27 | Stop reason: HOSPADM

## 2024-03-27 RX ORDER — MIDAZOLAM HYDROCHLORIDE 1 MG/ML
2 INJECTION INTRAMUSCULAR; INTRAVENOUS
Status: DISCONTINUED | OUTPATIENT
Start: 2024-03-27 | End: 2024-03-27 | Stop reason: HOSPADM

## 2024-03-27 RX ADMIN — ONDANSETRON 4 MG: 2 INJECTION INTRAMUSCULAR; INTRAVENOUS at 01:03

## 2024-03-27 RX ADMIN — EPHEDRINE SULFATE 15 MG: 50 INJECTION INTRAVENOUS at 01:03

## 2024-03-27 RX ADMIN — ROCURONIUM BROMIDE 5 MG: 10 SOLUTION INTRAVENOUS at 12:03

## 2024-03-27 RX ADMIN — EPINEPHRINE 10 MCG: 0.1 INJECTION INTRACARDIAC; INTRAVENOUS at 01:03

## 2024-03-27 RX ADMIN — EPHEDRINE SULFATE 10 MG: 50 INJECTION INTRAVENOUS at 01:03

## 2024-03-27 RX ADMIN — PHENYLEPHRINE HYDROCHLORIDE 100 MCG: 10 INJECTION INTRAVENOUS at 01:03

## 2024-03-27 RX ADMIN — ROPIVACAINE HYDROCHLORIDE 30 ML: 5 INJECTION, SOLUTION EPIDURAL; INFILTRATION; PERINEURAL at 12:03

## 2024-03-27 RX ADMIN — SUCCINYLCHOLINE CHLORIDE 120 MG: 20 INJECTION, SOLUTION INTRAMUSCULAR; INTRAVENOUS at 12:03

## 2024-03-27 RX ADMIN — FENTANYL CITRATE 50 MCG: 50 INJECTION, SOLUTION INTRAMUSCULAR; INTRAVENOUS at 12:03

## 2024-03-27 RX ADMIN — LIDOCAINE HYDROCHLORIDE 50 MG: 20 INJECTION, SOLUTION INTRAVENOUS at 12:03

## 2024-03-27 RX ADMIN — SODIUM CHLORIDE: 9 INJECTION, SOLUTION INTRAVENOUS at 12:03

## 2024-03-27 RX ADMIN — PROPOFOL 200 MG: 10 INJECTION, EMULSION INTRAVENOUS at 12:03

## 2024-03-27 RX ADMIN — ROPIVACAINE HYDROCHLORIDE 30 ML: 5 INJECTION EPIDURAL; INFILTRATION; PERINEURAL at 12:03

## 2024-03-27 RX ADMIN — EPINEPHRINE 5 MCG: 0.1 INJECTION INTRACARDIAC; INTRAVENOUS at 01:03

## 2024-03-27 RX ADMIN — DEXAMETHASONE SODIUM PHOSPHATE 4 MG: 4 INJECTION, SOLUTION INTRA-ARTICULAR; INTRALESIONAL; INTRAMUSCULAR; INTRAVENOUS; SOFT TISSUE at 01:03

## 2024-03-27 RX ADMIN — MIDAZOLAM 2 MG: 1 INJECTION INTRAMUSCULAR; INTRAVENOUS at 12:03

## 2024-03-27 RX ADMIN — CEFAZOLIN 2 G: 2 INJECTION, POWDER, FOR SOLUTION INTRAMUSCULAR; INTRAVENOUS at 12:03

## 2024-03-27 NOTE — PLAN OF CARE
Vitals signs stable. Pain and nausea well controlled. Discharge criteria/Radha met.Ok for transfer to phase II per  STEPHANIE Curran CRNA.

## 2024-03-27 NOTE — DISCHARGE INSTRUCTIONS
Call MD for:  difficulty breathing, headache or visual disturbances  Call MD for:  extreme fatigue  Call MD for:  hives  Call MD for:  persistent dizziness or light-headedness  Call MD for:  persistent nausea and vomiting  Call MD for:  redness, tenderness, or signs of infection (pain, swelling, redness, odor or green/yellow discharge around incision site)  Call MD for:  severe uncontrolled pain  Call MD for:  temperature >100.4  Lifting restrictions  No driving, operating heavy equipment or signing legal documents while taking pain medication.  Other restrictions (specify):  Ok to come out of sling for pendulum exercises, otherwise continue sling. No heavy lifting.  Remove dressing in 72 hours  SLING ORTHOPEDIC MEDIUM FOR HOME USE  Shower on day dressing removed (No bath)  Wound care routine (specify)  Wound care routine: keep dressing clean, dry, and intact. Ok to remove in 3 days and shower. No submersion.        You use the movement of your body to move your arm. Do NOT use your shoulder muscles to move your arm. Hold onto a table with one hand and bend forward at the waist until your back is level with the table. Let your sore arm hang in front of you. Do each exercise for 1 minute.  Pendulum exercises:  Circles ? Move your body in large circles one way. After you move a few times, your arm should start gently moving in circles. Now, move your body in circles the other way until your arm moves the other way.  Swinging side-to-side ? Move your body side to side. After you move a few times, your arm should start gently moving side-to-side.  Swinging back and forth ? Move your body forwards and then backwards. After you move a few times, your arm should start gently moving back and forth.

## 2024-03-27 NOTE — ANESTHESIA PROCEDURE NOTES
Peripheral Block    Patient location during procedure: holding area   Block not for primary anesthetic.  Reason for block: at surgeon's request and post-op pain management   Post-op Pain Location: Rotator cuff tear right shoulder   Start time: 3/27/2024 12:21 PM  Timeout: 3/27/2024 12:13 PM   End time: 3/27/2024 12:23 PM    Staffing  Authorizing Provider: Iván Curran CRNA  Performing Provider: Iván Curran CRNA    Staffing  Performed by: Iván Curran CRNA  Authorized by: Iván Curran CRNA    Preanesthetic Checklist  Completed: patient identified, IV checked, site marked, risks and benefits discussed, surgical consent, monitors and equipment checked, pre-op evaluation and timeout performed  Peripheral Block  Patient position: supine  Prep: ChloraPrep and site prepped and draped  Patient monitoring: heart rate, cardiac monitor, continuous pulse ox, continuous capnometry and frequent blood pressure checks  Block type: interscalene  Laterality: right  Injection technique: single shot  Needle  Needle type: Stimuplex   Needle gauge: 20 G  Needle localization: anatomical landmarks, paresthesias, ultrasound guidance and nerve stimulator  Catheter type: stimulating  Catheter size: 20 G     Assessment  Injection assessment: negative aspiration, negative parasthesia and local visualized surrounding nerve  Paresthesia pain: none  Heart rate change: no  Slow fractionated injection: yes  Pain Tolerance: comfortable throughout block and no complaints  Medications:    Medications: ropivacaine (NAROPIN) injection 0.5% - Perineural   30 mL - 3/27/2024 12:23:00 PM

## 2024-03-27 NOTE — BRIEF OP NOTE
[facility] - Periop Services  Brief Operative Note    Surgery Date: 3/27/2024     Surgeon(s) and Role:     * Eric Foy MD - Primary    Assisting Surgeon: None    Pre-op Diagnosis:  Superior glenoid labrum lesion of right shoulder, initial encounter [S43.431A]  Shoulder impingement syndrome, right [M75.41]  Traumatic tear of right rotator cuff, unspecified tear extent, initial encounter [S46.011A]  Preop testing [Z01.818]    Post-op Diagnosis:  Post-Op Diagnosis Codes:     * Superior glenoid labrum lesion of right shoulder, initial encounter [S43.431A]     * Shoulder impingement syndrome, right [M75.41]     * Traumatic tear of right rotator cuff, unspecified tear extent, initial encounter [S46.011A]     * Preop testing [Z01.818]    Procedure(s) (LRB):  DEBRIDEMENT, SHOULDER, ARTHROSCOPIC (Right)  REPAIR, ROTATOR CUFF, ARTHROSCOPIC (Right)    Anesthesia: General    Operative Findings: R shoulder scope, dinora, biceps, mo rtc sad    Estimated Blood Loss: * No values recorded between 3/27/2024  1:12 PM and 3/27/2024  2:03 PM *         Specimens:   Specimen (24h ago, onward)      None              Discharge Note    OUTCOME: Patient tolerated treatment/procedure well without complication and is now ready for discharge.    DISPOSITION: Home or Self Care    FINAL DIAGNOSIS:  Traumatic tear of right rotator cuff    FOLLOWUP: In clinic    DISCHARGE INSTRUCTIONS:    Discharge Procedure Orders   SLING ORTHOPEDIC MEDIUM FOR HOME USE     Diet general     Ice to affected area     Lifting restrictions     No driving, operating heavy equipment or signing legal documents while taking pain medication.     Other restrictions (specify):   Order Comments: Ok to come out of sling for pendulum exercises, otherwise continue sling. No heavy lifting.     Remove dressing in 72 hours     Wound care routine (specify)   Order Comments: Wound care routine: keep dressing clean, dry, and intact. Ok to remove in 3 days and shower. No  submersion.     Call MD for:  temperature >100.4     Call MD for:  persistent nausea and vomiting     Call MD for:  severe uncontrolled pain     Call MD for:  difficulty breathing, headache or visual disturbances     Call MD for:  redness, tenderness, or signs of infection (pain, swelling, redness, odor or green/yellow discharge around incision site)     Call MD for:  hives     Call MD for:  persistent dizziness or light-headedness     Call MD for:  extreme fatigue     Shower on day dressing removed (No bath)        Clinical Reference Documents Added to Patient Instructions         Document    HOW TO PREVENT SURGICAL SITE INFECTIONS (ENGLISH)    OXYCODONE AND ACETAMINOPHEN, ADULT (ENGLISH)    ROTATOR CUFF REPAIR DISCHARGE INSTRUCTIONS (ENGLISH)    SHOULDER REHAB EXERCISES, PHASE 1 (ENGLISH)

## 2024-03-27 NOTE — ANESTHESIA POSTPROCEDURE EVALUATION
Anesthesia Post Evaluation    Patient: Amadeo Jha    Procedure(s) Performed: Procedure(s) (LRB):  DEBRIDEMENT, SHOULDER, ARTHROSCOPIC (Right)  REPAIR, ROTATOR CUFF, ARTHROSCOPIC (Right)    Final Anesthesia Type: general      Patient location during evaluation: PACU  Patient participation: Yes- Able to Participate  Level of consciousness: awake and alert and oriented  Post-procedure vital signs: reviewed and stable  Pain management: adequate  Airway patency: patent    PONV status at discharge: No PONV  Anesthetic complications: no      Cardiovascular status: stable  Respiratory status: unassisted and spontaneous ventilation  Hydration status: euvolemic  Follow-up not needed.              Vitals Value Taken Time   /100 03/27/24 1425   Temp 36 °C (96.8 °F) 03/27/24 1404   Pulse 75 03/27/24 1429   Resp 18 03/27/24 1429   SpO2 97 % 03/27/24 1429   Vitals shown include unvalidated device data.      No case tracking events are documented in the log.      Pain/Radha Score: Radha Score: 8 (3/27/2024  2:04 PM)

## 2024-03-27 NOTE — TRANSFER OF CARE
Anesthesia Transfer of Care Note    Patient: Amadeo Jha    Procedure(s) Performed: Procedure(s) (LRB):  DEBRIDEMENT, SHOULDER, ARTHROSCOPIC (Right)  REPAIR, ROTATOR CUFF, ARTHROSCOPIC (Right)    Patient location: PACU    Anesthesia Type: general    Transport from OR: Transported from OR on room air with adequate spontaneous ventilation    Post pain: adequate analgesia    Post assessment: no apparent anesthetic complications    Post vital signs: stable    Level of consciousness: sedated    Nausea/Vomiting: no nausea/vomiting    Complications: none    Transfer of care protocol was followedComments: /86  HR 87  RR 20  O2 Sat 95  Temp 36      Last vitals: Visit Vitals  BP (!) 151/105 (BP Location: Left arm, Patient Position: Lying)   Pulse (!) 57   Temp 36.6 °C (97.9 °F) (Oral)   Resp 18   Ht 6' (1.829 m)   Wt 116.4 kg (256 lb 9.9 oz)   SpO2 98%   BMI 34.80 kg/m²

## 2024-03-27 NOTE — ANESTHESIA PROCEDURE NOTES
Intubation    Date/Time: 3/27/2024 12:39 PM    Performed by: Iván Curran CRNA  Authorized by: Iván Curran CRNA    Intubation:     Induction:  Intravenous    Intubated:  Postinduction    Mask Ventilation:  Not attempted    Attempts:  1    Attempted By:  CRNA    Method of Intubation:  Video laryngoscopy    Blade:  Glidescope 3    Laryngeal View Grade: Grade IIA - cords partially seen      Difficult Airway Encountered?: No      Complications:  None    Airway Device:  Oral endotracheal tube    Airway Device Size:  8.0    Style/Cuff Inflation:  Cuffed    Inflation Amount (mL):  10    Tube secured:  23    Secured at:  The teeth    Placement Verified By:  Capnometry    Complicating Factors:  None    Findings Post-Intubation:  BS equal bilateral and atraumatic/condition of teeth unchanged  Notes:      Known difficult airway requiring Glidescope intubation on previous sinus surgery.  Glidescope intubation X 1 successful attempt.  +/= Bilateral Breath Sounds.  Teeth in preop condition.

## 2024-03-27 NOTE — OP NOTE
DATE OF SURGERY:    3/27/2024      SURGEON:  Eric Foy MD    HOSPITAL:  [facility]     SERVICE:  Orthopedics      PREOPERATIVE DIAGNOSIS:  Right shoulder labral tear, right shoulder subacromial impingement, right shoulder full-thickness rotator cuff tear, right shoulder bicipital tendonitis and tearing      POSTOPERATIVE DIAGNOSIS:  Same as above.      PROCEDURE:  1. Right shoulder arthroscopy with debridement of labral tear 2. Right shoulder arthroscopic biceps tenotomy 3. Right shoulder mini open full-thickness rotator cuff repair 4. Right shoulder mini open subacromial decompression      ANESTHESIA:  LMA.      IV FLUIDS:  See Anesthesia.      ESTIMATED BLOOD LOSS:  Less than 20 cc.      COUNTS:  Correct.      COMPLICATIONS:  None.      IMPLANTS:    Implant Name Type Inv. Item Serial No.  Lot No. LRB No. Used Action   Ventix Link Knotless Adams 4.75mm PEEK Adams    BIOMET [id number] Right 1 Implanted         DISPOSITION:  Stable to PACU.      INDICATIONS FOR PROCEDURE: Amadeo Jha is a 48 y.o. old male  with continued pain and loss of motion of the right shoulder.  The patient has failed multiple conservative treatments. Risks, benefits, and alternatives discussed with the patient as well as patient's family in detail.  All questions were answered.  Informed consent was obtained.      PROCEDURE IN DETAIL: The patient was found in preoperative holding by Anesthesia and found fit for surgery.  Patient was taken to the operating room and placed on the operating table in supine position.  All bony prominences were well padded.  Time-out was called to identify correct patient, correct procedure, and correct site, and all were in agreement.  The patient underwent LMA anesthesia without complications.  The patient was then prepped and draped in normal sterile fashion, leaving the right upper extremity exposed for surgery.  The patient was in the modified beach chair position.  Preoperative antibiotics  wer given. Next, through the posterior portal with good backflow, a 1 cm incision was made.  A camera was introduced into the glenohumeral joint.  After this was done, the anterior portal was then made through an incision just lateral to the tip of the coracoid.  Next, examination of the glenohumeral joint demonstrated significant changes both on the humeral head and glenoid side.  He had multiple loose bodies which was removed with a 3.5 shaver.  Feel recess was inspected no loose bodies otherwise.  Attention was turned to the biceps tendon ankle where patient had an unstable anterior labrum.  With use of 3.5 shaver.  Patient when a debridement of the unstable labrum.  He also had tearing throughout the biceps tendon in the intra-articular and groove portion.  Given this patient when a arthroscopic biceps tenotomy.  This took a lot of pressure off of the anchor as well.  After complete biceps tenotomy attention was turned laterally where patient had a tear in the rotator cuff.  Further inspection demonstrated a full-thickness tear of the supraspinatus tendon off of the insertion area given this a mini open incision was made, 3 cm of the anterolateral aspect of the right shoulder soft tissue dissection was taken down to the fascia were split in line with its fibers.  Next a large amount of bursal tissue was removed in his area.  He did have scraping of the undersurface of the acromion.  Next after the subacromial decompression the rotator cuff was identified, it was repaired with 4 strands and 1 anchor, covering the humeral head.  There was no additional tears appreciated.  After this was done, hemostasis was achieved.  Copious irrigation was used to wash the wound.  The fascia was closed with 0 Vicryl, subcutaneous tissues closed with 2-0 Vicryl, skin was closed with 3-0 Monocryl sutures.  Mastisol, Steri-Strips, Xeroform, 4 x 4's, soft tissue dressing, and a shoulder abduction sling were placed on the affected  upper extremity.  The patient was awoken by Anesthesia and brought to PACU in stable condition.    ______________________________  Eric Foy MD

## 2024-04-01 VITALS
SYSTOLIC BLOOD PRESSURE: 147 MMHG | WEIGHT: 256.63 LBS | TEMPERATURE: 98 F | OXYGEN SATURATION: 97 % | DIASTOLIC BLOOD PRESSURE: 94 MMHG | RESPIRATION RATE: 20 BRPM | HEART RATE: 77 BPM | HEIGHT: 72 IN | BODY MASS INDEX: 34.76 KG/M2

## 2024-04-02 ENCOUNTER — HOSPITAL ENCOUNTER (OUTPATIENT)
Dept: RADIOLOGY | Facility: HOSPITAL | Age: 49
Discharge: HOME | End: 2024-04-02
Attending: INTERNAL MEDICINE
Payer: COMMERCIAL

## 2024-04-02 DIAGNOSIS — K22.2 STRICTURE AND STENOSIS OF ESOPHAGUS: ICD-10-CM

## 2024-04-02 PROCEDURE — 74220 X-RAY XM ESOPHAGUS 1CNTRST: CPT | Mod: TC

## 2024-04-02 PROCEDURE — A9698 NON-RAD CONTRAST MATERIALNOC: HCPCS | Performed by: RADIOLOGY

## 2024-04-02 PROCEDURE — 25500020 PHARM REV CODE 255: Performed by: RADIOLOGY

## 2024-04-02 RX ADMIN — BARIUM SULFATE 100 ML: 0.6 SUSPENSION ORAL at 09:04

## 2024-04-02 RX ADMIN — BARIUM SULFATE 100 ML: 980 POWDER, FOR SUSPENSION ORAL at 09:04

## 2024-04-03 RX ORDER — OXYCODONE AND ACETAMINOPHEN 10; 325 MG/1; MG/1
1 TABLET ORAL EVERY 8 HOURS PRN
Qty: 21 TABLET | Refills: 0 | Status: SHIPPED | OUTPATIENT
Start: 2024-04-03 | End: 2024-04-10 | Stop reason: ALTCHOICE

## 2024-04-04 ENCOUNTER — PATIENT MESSAGE (OUTPATIENT)
Dept: ADMINISTRATIVE | Facility: OTHER | Age: 49
End: 2024-04-04
Payer: COMMERCIAL

## 2024-04-10 ENCOUNTER — OFFICE VISIT (OUTPATIENT)
Dept: ORTHOPEDICS | Facility: CLINIC | Age: 49
End: 2024-04-10
Payer: COMMERCIAL

## 2024-04-10 VITALS
HEIGHT: 72 IN | DIASTOLIC BLOOD PRESSURE: 91 MMHG | SYSTOLIC BLOOD PRESSURE: 134 MMHG | WEIGHT: 256 LBS | HEART RATE: 109 BPM | BODY MASS INDEX: 34.67 KG/M2

## 2024-04-10 DIAGNOSIS — S46.011A TRAUMATIC TEAR OF RIGHT ROTATOR CUFF, UNSPECIFIED TEAR EXTENT, INITIAL ENCOUNTER: ICD-10-CM

## 2024-04-10 DIAGNOSIS — S43.431A SUPERIOR GLENOID LABRUM LESION OF RIGHT SHOULDER, INITIAL ENCOUNTER: Primary | ICD-10-CM

## 2024-04-10 DIAGNOSIS — S46.111D LABRAL TEAR OF LONG HEAD OF RIGHT BICEPS TENDON, SUBSEQUENT ENCOUNTER: ICD-10-CM

## 2024-04-10 PROCEDURE — 1159F MED LIST DOCD IN RCRD: CPT | Mod: CPTII,,,

## 2024-04-10 PROCEDURE — 3080F DIAST BP >= 90 MM HG: CPT | Mod: CPTII,,,

## 2024-04-10 PROCEDURE — 99024 POSTOP FOLLOW-UP VISIT: CPT | Mod: ,,,

## 2024-04-10 PROCEDURE — 3075F SYST BP GE 130 - 139MM HG: CPT | Mod: CPTII,,,

## 2024-04-10 RX ORDER — HYDROCODONE BITARTRATE AND ACETAMINOPHEN 10; 325 MG/1; MG/1
TABLET ORAL
COMMUNITY
Start: 2024-02-28 | End: 2024-04-10 | Stop reason: ALTCHOICE

## 2024-04-10 RX ORDER — MONTELUKAST SODIUM 10 MG/1
TABLET ORAL
COMMUNITY

## 2024-04-10 RX ORDER — OLMESARTAN MEDOXOMIL AND HYDROCHLOROTHIAZIDE 40/25 40; 25 MG/1; MG/1
1 TABLET ORAL DAILY
COMMUNITY
Start: 2024-02-28

## 2024-04-10 RX ORDER — OXYCODONE AND ACETAMINOPHEN 7.5; 325 MG/1; MG/1
1 TABLET ORAL EVERY 8 HOURS PRN
Qty: 21 TABLET | Refills: 0 | Status: SHIPPED | OUTPATIENT
Start: 2024-04-10 | End: 2024-04-18 | Stop reason: SDUPTHER

## 2024-04-10 NOTE — PROGRESS NOTES
Subjective:    CC: Post-op Evaluation of the Right Shoulder (Post op Rt shoulder bicep tenotomy/RTC/SAD 3/27/24  pt currently having pain, Taking pain meds.) and Post-op Evaluation       HPI:  Patient returns to clinic for post op visit. Status post right shoulder arthroscopy, labral debridement, biceps tenotomy and rotator cuff repair on 03/27/2024. Approximately 2 weeks out. The patients pain is still present but improving; well managed on current narcotic.  Patient would like a refill today. The patient states no signs of infection. Patient presents today wearing an abduction sling. No new complaints.    ROS: Refer to HPI for pertinent ROS. All other 12 point systems negative.    Objective:    Vitals:    04/10/24 0934   BP: (!) 134/91   Pulse: 109        Physical Exam:  Right upper extremity compartments are soft and warm. There are no signs or symptoms of DVT or infection. Incision sites are well healed, clean, and dry. Appropriately tender to palpation about anterior shoulder aspect. Passive range of motion shows that the patient has 150 degrees of abduction and forward flexion; 100 degrees active ROM. Neurovascularly intact distally.    Images:  Previous Images Reviewed and discussed with patient.    Assessment:  1. Superior glenoid labrum lesion of right shoulder, initial encounter  - Ambulatory referral/consult to Physical/Occupational Therapy; Future    2. Traumatic tear of right rotator cuff, unspecified tear extent, initial encounter  - oxyCODONE-acetaminophen (PERCOCET) 7.5-325 mg per tablet; Take 1 tablet by mouth every 8 (eight) hours as needed for Pain.  Dispense: 21 tablet; Refill: 0  - Ambulatory referral/consult to Physical/Occupational Therapy; Future    3. Labral tear of long head of right biceps tendon, subsequent encounter  - Ambulatory referral/consult to Physical/Occupational Therapy; Future       Plan:  Physical exam and intraoperative findings discussed with the patient. Wound care  instructions given.  Patient is progressing nicely.  We will start formal physical therapy to work on range of motion; hold off on strengthening at this time.  Okay to discontinue sling use.. The Patient was instructed to take pain medication as needed with appropriate precautions and to refrain from heavy lifting. I would like to see the patient back in 4 weeks to assess the patients progress.    Follow up: Follow up in about 4 weeks (around 5/8/2024).

## 2024-04-18 DIAGNOSIS — S46.011A TRAUMATIC TEAR OF RIGHT ROTATOR CUFF, UNSPECIFIED TEAR EXTENT, INITIAL ENCOUNTER: ICD-10-CM

## 2024-04-18 RX ORDER — OXYCODONE AND ACETAMINOPHEN 7.5; 325 MG/1; MG/1
1 TABLET ORAL EVERY 8 HOURS PRN
Qty: 21 TABLET | Refills: 0 | Status: SHIPPED | OUTPATIENT
Start: 2024-04-18 | End: 2024-04-25

## 2024-04-25 ENCOUNTER — CLINICAL SUPPORT (OUTPATIENT)
Dept: REHABILITATION | Facility: HOSPITAL | Age: 49
End: 2024-04-25
Payer: COMMERCIAL

## 2024-04-25 DIAGNOSIS — S46.011A TRAUMATIC TEAR OF RIGHT ROTATOR CUFF, UNSPECIFIED TEAR EXTENT, INITIAL ENCOUNTER: ICD-10-CM

## 2024-04-25 DIAGNOSIS — M75.41 IMPINGEMENT SYNDROME OF RIGHT SHOULDER: ICD-10-CM

## 2024-04-25 DIAGNOSIS — S43.431A SUPERIOR GLENOID LABRUM LESION OF RIGHT SHOULDER, INITIAL ENCOUNTER: ICD-10-CM

## 2024-04-25 DIAGNOSIS — M25.511 ACUTE PAIN OF RIGHT SHOULDER: ICD-10-CM

## 2024-04-25 DIAGNOSIS — S46.111D LABRAL TEAR OF LONG HEAD OF RIGHT BICEPS TENDON, SUBSEQUENT ENCOUNTER: ICD-10-CM

## 2024-04-25 DIAGNOSIS — M25.611 DECREASED ROM OF RIGHT SHOULDER: Primary | ICD-10-CM

## 2024-04-25 DIAGNOSIS — R29.898 DECREASED STRENGTH OF UPPER EXTREMITY: ICD-10-CM

## 2024-04-25 PROCEDURE — 97162 PT EVAL MOD COMPLEX 30 MIN: CPT

## 2024-04-25 NOTE — PLAN OF CARE
[facility]   Physical Therapy Initial Evaluation      Name: Amadeo Jha  [facility] Number: [id number]    Therapy Diagnosis:   Encounter Diagnoses   Name Primary?    Superior glenoid labrum lesion of right shoulder, initial encounter     Traumatic tear of right rotator cuff, unspecified tear extent, initial encounter     Labral tear of long head of right biceps tendon, subsequent encounter     Decreased ROM of right shoulder Yes    Decreased strength of upper extremity     Acute pain of right shoulder     Impingement syndrome of right shoulder         Physician: Sloane Escobedo PA*    Physician Orders: PT Eval and Treat   Medical Diagnosis from Referral: S43.431A - Superior glenoid labrum lesion of the right shoulder , S46.011A - Traumatic Tear of right rotator cuff unspecified tear extent, S46.111D - Labral tear of long head of right biceps tendon  Evaluation Date: 4/25/2024  Authorization Period Expiration: TBD  Plan of Care Expiration: TBD  Progress Note Due: 5/23/24  Date of Surgery: 3/27/24  Visit # / Visits authorized: TBD   FOTO: 1/ 3    Precautions: Standard and Weightbearing     Time In: 1106  Time Out: 1155  Total Billable Time: 49 minutes    Subjective     Date of onset: 3/27/24    History of current condition - Amadeo reports: that he underwent arthroscopic surgery to repair his Right shoulder labral tear, rotator cuff tear, biceps tear, and impingement syndrome. The surgery was performed by Dr. Foy. Patient reports that he has had a good recover so far with minimal discomfort. Amadeo states that Dr. Foy has discharged the use of his right shoulder sling. Patient presents today actively moving right upper extremity.     Falls: None    Imaging: MRI studies: 1/10/24  Impression:  - A high-grade partial thickness articular surface tear is present to the distal supraspinatus tendon and the anterior fibers of the distal infraspinatus tendon without significant retraction  or associated atrophy.  - Multifocal degeneration of the glenoid labrum.  A tear is present to the anterior and superior portions of the glenoid labrum and is best seen on image 15 of series 6.  - Osteoarthritic changes are present to the glenohumeral joint and are advanced for the patient's age.  - Long head biceps tenosynovitis.  - A 9 mm erosion or intraosseous geode is present to the greater tuberosity at the insertion of the supraspinatus tendon/footplate.  This is somewhat unusual given the patient's age and a superimposed inflammatory arthropathy may be considered under the appropriate clinical circumstances.    Prior Therapy: Patient received 7 therapy treatments prior to his surgery  Social History:  lives with their spouse  Occupation: Owner of meat [employer]   Prior Level of Function: Independent   Current Level of Function: modified independent due to increased time for completion of tasks.     Pain:  Current 3/10, worst 6/10, best 3/10   Location: right shoulder    Description: Aching  Aggravating Factors: Lifting  Easing Factors: rest    Patients goals: I want to return to my previous level of work with no pain      Medical History:   Past Medical History:   Diagnosis Date    ADD (attention deficit disorder)     Deviated nasal septum     Hypertension     PINKY (obstructive sleep apnea)     no cpap    Pain, joint, shoulder     Painful swallowing     Traumatic tear of right rotator cuff        Surgical History:   Amadeo Jha  has a past surgical history that includes left elbow; Cervical fusion; Knee arthroscopy w/ ACL reconstruction (Right); Repair of ligament of wrist (Right, 7/25/2022); Nasal septoplasty (N/A, 12/19/2023); excision, nasal turbinate, submucosal (Bilateral, 12/19/2023); Arthroscopic debridement of shoulder (Right, 3/27/2024); Arthroscopic repair of rotator cuff of shoulder (Right, 3/27/2024); Decompression of subacromial space (Right, 3/27/2024); and Arthroscopic tenotomy of biceps  tendon (Right, 3/27/2024).    Medications:   Amadeo has a current medication list which includes the following prescription(s): celecoxib, dextroamphetamine-amphetamine, febuxostat, lansoprazole, lisinopril-hydrochlorothiazide, montelukast, olmesartan, olmesartan-hydrochlorothiazide, oxycodone-acetaminophen, and vitamin d.    Allergies:   Review of patient's allergies indicates:  No Known Allergies     Objective      Posture: elevated right shoulder with moderate scapula abduction   Palpation: tenderness around Right shoulder joint   Sensation: light touch intact     Range of Motion/Strength:     Shoulder Right Pain/Dysfunction with Movement   PROM     flexion 140 deg    extension WFL    abduction 140 deg    adduction WFL    Internal rotation 55 deg    ER at 90° abd 60 deg    ER at 0° abd 35 deg        U/E MMT Right Left Pain/Dysfunction with Movement   Shoulder Flexion  Not tested  5/5    Shoulder Extension x 5/5    Shoulder Abduction x 5/5    Shoulder Adduction x 5/5    Shoulder Internal Rotation x 5/5    Shoulder External Rotation x 5/5    Shoulder ER  @ 90* Abduction x 5/5    Elbow Flexion  x 5/5    Elbow Extension x 5/5    Rhomboids x 5/5    Mid Traps x 5/5    Low Traps x 5/5      X : not tested due to restrictions        Intake Outcome Measure for FOTO Shoulder  Survey    Therapist reviewed FOTO scores for Amadeo Jha on 4/25/2024.   FOTO report - see Media section or FOTO account episode details.    Intake Score: 51 points         Treatment     Total Treatment time (time-based codes) separate from Evaluation: 10 minutes     Amadeo received the treatments listed below:      therapeutic exercises to develop strength and ROM for 10 minutes including:  Therapeutic Exercise   Therapeutic Exercise Grid     Exercise 1  Exercise 2  Exercise 3  Exercise 4    Exercise :    Scapular retraction  Table slides  AROM  Right elbow and wrist      Repetition/Time :    20   20 20      Resist or Assist :    5 sec  hold               Comment :                Done :    Y    Y  YES                      Patient Education and Home Exercises     Education provided:   - Patient instructed in Right shoulder restrictions ( no active RANGE OF MOTION ) and precautions   - Discussed importance of daily exercises     Written Home Exercises Provided: yes. Exercises were reviewed and Amadeo was able to demonstrate them prior to the end of the session.  Amadeo demonstrated good  understanding of the education provided. See  Patient Instructions for exercises provided during therapy sessions.    Assessment     Amadeo is a 48 y.o. male referred to outpatient Physical Therapy with a medical diagnosis of S43.431A - Superior glenoid labrum lesion of the right shoulder , S46.011A - Traumatic Tear of right rotator cuff unspecified tear extent, S46.111D - Labral tear of long head of right biceps tendon. Patient presents with   Superior glenoid labrum lesion of right shoulder, initial encounter   Traumatic tear of right rotator cuff, unspecified tear extent, initial encounter   Labral tear of long head of right biceps tendon, subsequent encounter   Decreased ROM of right shoulder   Decreased strength of upper extremity   Acute pain of right shoulder   Impingement syndrome of right shoulder       Patient prognosis is Excellent.   Patient will benefit from skilled outpatient Physical Therapy to address the deficits stated above and in the chart below, provide patient /family education, and to maximize patientt's level of independence.     Plan of care discussed with patient: Yes  Patient's spiritual, cultural and educational needs considered and patient is agreeable to the plan of care and goals as stated below:     Anticipated Barriers for therapy: chronic right shoulder pain     Medical Necessity is demonstrated by the following  History  Co-morbidities and personal factors that may impact the plan of care [] LOW: no personal factors / co-morbidities  [x] MODERATE:  1-2 personal factors / co-morbidities  [] HIGH: 3+ personal factors / co-morbidities    Moderate / High Support Documentation:   Co-morbidities affecting plan of care: see subjective     Personal Factors:   no deficits     Examination  Body Structures and Functions, activity limitations and participation restrictions that may impact the plan of care [] LOW: addressing 1-2 elements  [x] MODERATE: 3+ elements  [] HIGH: 4+ elements (please support below)    Moderate / High Support Documentation: see objectives      Clinical Presentation [] LOW: stable  [x] MODERATE: Evolving  [] HIGH: Unstable     Decision Making/ Complexity Score: moderate       Goals:  Short Term Goals (4 Weeks):   1. Patient will be compliant with home exercise program to assist therapy in restoring pain free motion of the shoulder.   2. Patient will improve impaired shoulder manual muscle tests 1/2 grade bilaterally to improve strength for functional tasks.  3. Patient will improve PROM R shoulder flexion >/= 155 degrees to improve functional mobility of upper extremities.  4. Patient will improve PROM R shoulder abduction >/=150 degrees to improve functional mobility of upper extremities.      Long Term Goals (8 Weeks):   1. Patient will improve FOTO score to >/= 71 points to demonstrate improvements in R UE carrying, moving, and handling objects  2. Patient will improve impaired shoulder manual muscle tests 1 grade bilaterally to improve strength for household duties.  3. Patient will improve R shoulder flexion to >/= 165 degrees to improve functional mobility of upper extremities.   4. Patient will improve R shoulder external rotation @ 90 degrees abduction  to >/= 70 degrees to improve functional mobility of upper extremities.    Plan     Plan of care Certification: 4/25/2024 to TBD.    Outpatient Physical Therapy 3 times weekly for 2 weeks  then 2 times per week for 4 weeks to include the following interventions: Manual Therapy, Neuromuscular  Re-ed, Patient Education, Therapeutic Activities, and Therapeutic Exercise.     Adam Chang, PT        Physician's Signature: _________________________________________ Date: ________________

## 2024-05-07 DIAGNOSIS — S43.431A SUPERIOR GLENOID LABRUM LESION OF RIGHT SHOULDER: Primary | ICD-10-CM

## 2024-05-07 DIAGNOSIS — S46.011A TRAUMATIC TEAR OF RIGHT ROTATOR CUFF, UNSPECIFIED TEAR EXTENT, INITIAL ENCOUNTER: ICD-10-CM

## 2024-05-07 DIAGNOSIS — S46.111D LABRAL TEAR OF LONG HEAD OF RIGHT BICEPS TENDON, SUBSEQUENT ENCOUNTER: ICD-10-CM

## 2024-05-08 ENCOUNTER — OFFICE VISIT (OUTPATIENT)
Dept: ORTHOPEDICS | Facility: CLINIC | Age: 49
End: 2024-05-08
Payer: COMMERCIAL

## 2024-05-08 DIAGNOSIS — S43.431A SUPERIOR GLENOID LABRUM LESION OF RIGHT SHOULDER, INITIAL ENCOUNTER: Primary | ICD-10-CM

## 2024-05-08 DIAGNOSIS — S46.011A TRAUMATIC TEAR OF RIGHT ROTATOR CUFF, UNSPECIFIED TEAR EXTENT, INITIAL ENCOUNTER: ICD-10-CM

## 2024-05-08 DIAGNOSIS — S46.111D LABRAL TEAR OF LONG HEAD OF RIGHT BICEPS TENDON, SUBSEQUENT ENCOUNTER: ICD-10-CM

## 2024-05-08 PROCEDURE — 1159F MED LIST DOCD IN RCRD: CPT | Mod: CPTII,,,

## 2024-05-08 PROCEDURE — 1160F RVW MEDS BY RX/DR IN RCRD: CPT | Mod: CPTII,,,

## 2024-05-08 PROCEDURE — 99024 POSTOP FOLLOW-UP VISIT: CPT | Mod: ,,,

## 2024-05-08 NOTE — PROGRESS NOTES
Subjective:    CC: Follow-up of the Right Shoulder (F/U for right shoulder. Shoulder is doing good. Having mild pain. Able to move it ok. No concerns with it. )       HPI:  Patient returns to clinic for post op visit. Status post right shoulder arthroscopy, labral debridement, biceps tenotomy and rotator cuff repair on 03/27/2024. Approximately 6 weeks out. The patients pain is still present but improving; no pain med needed.   The patient states no signs of infection. He recently had PT approved but missed his first session yesterday. No new complaints.     ROS: Refer to HPI for pertinent ROS. All other 12 point systems negative.    Objective:    There were no vitals filed for this visit.     Physical Exam:  Right upper extremity compartments are soft and warm. There are no signs or symptoms of DVT or infection. Incision sites are well healed, clean, and dry. Appropriately tender to palpation about anterolateral shoulder aspect. Passive range of motion shows that the patient has 180 degrees of abduction and forward flexion; 160 active ROM. Neurovascularly intact distally.    Images:  Previous Images Reviewed and discussed with patient.    Assessment:  1. Superior glenoid labrum lesion of right shoulder, initial encounter    2. Traumatic tear of right rotator cuff, unspecified tear extent, initial encounter    3. Labral tear of long head of right biceps tendon, subsequent encounter       Plan:  Physical exam and intraoperative findings discussed with the patient.  He is progressing nicely.  I have emphasized the importance of starting formal physical therapy to work on gaining strength. The Patient was instructed to take OTC pain medication as needed with appropriate precautions and to refrain from heavy lifting. I would like to see the patient back in 8 weeks to assess the patients progress.    Follow up: Follow up in about 8 weeks (around 7/3/2024).

## 2024-05-09 ENCOUNTER — CLINICAL SUPPORT (OUTPATIENT)
Dept: REHABILITATION | Facility: HOSPITAL | Age: 49
End: 2024-05-09
Payer: COMMERCIAL

## 2024-05-09 DIAGNOSIS — S43.431A SUPERIOR GLENOID LABRUM LESION OF RIGHT SHOULDER, INITIAL ENCOUNTER: Primary | ICD-10-CM

## 2024-05-09 PROCEDURE — 97110 THERAPEUTIC EXERCISES: CPT | Mod: CQ

## 2024-05-09 NOTE — PROGRESS NOTES
[facility]   Physical Therapy Treatment Note     Name: Amadeo Jha  [facility] Number: [id number]    Therapy Diagnosis: No diagnosis found.  Physician: Sloane Escobedo PA*    Visit Date: 5/9/2024    Physician Orders: PT Eval and Treat   Medical Diagnosis from Referral: S43.431A - Superior glenoid labrum lesion of the right shoulder , S46.011A - Traumatic Tear of right rotator cuff unspecified tear extent, S46.111D - Labral tear of long head of right biceps tendon  Evaluation Date: 4/25/2024  Authorization Period Expiration: 6/14/24  Plan of Care Expiration: 6/14/24  Progress Note Due: 5/23/24  Date of Surgery: 3/27/24  Visit # / Visits authorized: 1/14  FOTO: 1/ 3     Precautions: Standard and Weightbearin    PTA Visit #: 1/5     Time In: 1103  Time Out: 1134  Total Billable Time: 31 minutes    SUBJECTIVE     Pt reports: he has some slight soreness, but it's not too bad. It feels like the surgery has helped so far..  He was compliant with home exercise program      Pain: 3/10  Location: right shoulder      OBJECTIVE     Objective Measures updated at progress report unless specified.     Treatment     Amadeo received the treatments listed below:      therapeutic exercises to develop strength, ROM, and flexibility for 31 minutes including:  Therapeutic Exercise Grid     Exercise 1  Exercise 2  Exercise 3  Exercise 4    Exercise :    Scapular retraction  Table slides  AROM  Right elbow and wrist Rows,extension,     Repetition/Time :    15   20 20 15     Resist or Assist :    5 sec  hold         Red TB     Comment :                 Done :    yes    no  no  yes                     Exercise 5  Exercise 6  Exercise 7  Exercise 8    Exercise :    Lawnmower, Serratus punch, triceps press   Biceps curl   Supine salutes   Supine serratus punch     Repetition/Time :    15 ea   20   15   15     Resist or Assist :    Red TB   2 lb           Comment :                  Done :    yes   yes   yes   yes                      Exercise 9  Exercise 10  Exercise 11  Exercise 12    Exercise :    Supine AROM   Supine AAROM-flexion   Low rows        Repetition/Time :    15   15   15 x 5 ssec        Resist or Assist :                  Comment :    Flexion to <120 deg              Done :    yes   yes yes                         Exercise 13 Exercise 14  Exercise 15  Exercise 16   Exercise :                  Repetition/Time :                  Resist or Assist :                  Comment :                  Done :                                  Exercise 17 Exercise 18 Exercise 19 Exercise 20    Exercise :                  Repetition/Time :                  Resist or Assist :                  Comment :                  Done :                                         Patient Education and Home Exercises     Home Exercises Provided and Patient Education Provided       Written Home Exercises Provided: Patient instructed to cont prior HEP. Exercises were reviewed and Amadeo was able to demonstrate them prior to the end of the session.  Amadeo demonstrated good  understanding of the education provided. See EMR under Patient Instructions for exercises provided during therapy sessions    ASSESSMENT     Amadeo demonstrated positive results from treatment today as evidenced by having infrequent and mild reports of increased pain during session. He is making good progress toward goals as evidenced by ability to perform and complete all exercises within protocol. Moderate verbal and tactile cues required for form. Recommend continue with current POC and progress patient as tolerated.     Amadeo Is progressing well towards his goals.   Pt prognosis is Excellent.     Pt will continue to benefit from skilled outpatient physical therapy to address the deficits listed in the problem list box on initial evaluation, provide pt/family education and to maximize pt's level of independence in the home and community environment.     Pt's spiritual, cultural  and educational needs considered and pt agreeable to plan of care and goals.      Goals: Short Term Goals (4 Weeks):   1. Patient will be compliant with home exercise program to assist therapy in restoring pain free motion of the shoulder.   2. Patient will improve impaired shoulder manual muscle tests 1/2 grade bilaterally to improve strength for functional tasks.  3. Patient will improve PROM R shoulder flexion >/= 155 degrees to improve functional mobility of upper extremities.  4. Patient will improve PROM R shoulder abduction >/=150 degrees to improve functional mobility of upper extremities.      Long Term Goals (8 Weeks):   1. Patient will improve FOTO score to >/= 71 points to demonstrate improvements in R UE carrying, moving, and handling objects  2. Patient will improve impaired shoulder manual muscle tests 1 grade bilaterally to improve strength for household duties.  3. Patient will improve R shoulder flexion to >/= 165 degrees to improve functional mobility of upper extremities.   4. Patient will improve R shoulder external rotation @ 90 degrees abduction  to >/= 70 degrees to improve functional mobility of upper extremities.    PLAN     Outpatient Physical Therapy 3 times weekly for 2 weeks  then 2 times per week for 4 weeks to include the following interventions: Manual Therapy, Neuromuscular Re-ed, Patient Education, Therapeutic Activities, and Therapeutic Exercise.     Noe Madison, PTA

## 2024-05-14 ENCOUNTER — CLINICAL SUPPORT (OUTPATIENT)
Dept: REHABILITATION | Facility: HOSPITAL | Age: 49
End: 2024-05-14
Payer: COMMERCIAL

## 2024-05-14 DIAGNOSIS — S43.431A SUPERIOR GLENOID LABRUM LESION OF RIGHT SHOULDER, INITIAL ENCOUNTER: Primary | ICD-10-CM

## 2024-05-14 PROCEDURE — 97110 THERAPEUTIC EXERCISES: CPT | Mod: CQ

## 2024-05-14 NOTE — PROGRESS NOTES
[facility]   Physical Therapy Treatment Note     Name: Amadeo Jha  [facility] Number: [id number]    Therapy Diagnosis: No diagnosis found.  Physician: Sloane Escobedo PA*    Visit Date: 5/14/2024    Physician Orders: PT Eval and Treat   Medical Diagnosis from Referral: S43.431A - Superior glenoid labrum lesion of the right shoulder , S46.011A - Traumatic Tear of right rotator cuff unspecified tear extent, S46.111D - Labral tear of long head of right biceps tendon  Evaluation Date: 4/25/2024  Authorization Period Expiration: 6/14/24  Plan of Care Expiration: 6/14/24  Progress Note Due: 5/23/24  Date of Surgery: 3/27/24  Visit # / Visits authorized: 2/14  FOTO: 2/ 3     Precautions: Standard and Weightbearin    PTA Visit #: 2/5     Time In: 0900  Time Out: 0930  Total Billable Time: 30 minutes    SUBJECTIVE     Pt reports: no new issues. Requests red Thera-band for home use.  He was compliant with home exercise program    FOTO Score: 55 (5/14/24) improved from 51 on (4/25/24)    Pain: 2/10  Location: right shoulder      OBJECTIVE     Objective Measures updated at progress report unless specified.     Treatment     Amadeo received the treatments listed below:      therapeutic exercises to develop strength, ROM, and flexibility for 31 minutes including:  Therapeutic Exercise Grid     Exercise 1  Exercise 2  Exercise 3  Exercise 4    Exercise :    Scapular retraction  Table slides  AROM  Right elbow and wrist Rows,extension,     Repetition/Time :    15   20 20 20     Resist or Assist :    5 sec  hold         Red TB     Comment :                 Done :    yes    no  no  yes                     Exercise 5  Exercise 6  Exercise 7  Exercise 8    Exercise :    Lawnmower, Serratus punch, triceps press   Biceps curl   Supine salutes   Supine serratus punch     Repetition/Time :    20 ea   2 x 20   15   15     Resist or Assist :    Red TB   2 lb           Comment :                  Done :    yes    yes   yes   yes                     Exercise 9  Exercise 10  Exercise 11  Exercise 12    Exercise :    Supine AROM   Supine AAROM-flexion   Low rows        Repetition/Time :    15   15   15 x 5 ssec        Resist or Assist :                  Comment :    Flexion to <120 deg              Done :    yes   yes yes                         Exercise 13 Exercise 14  Exercise 15  Exercise 16   Exercise :                  Repetition/Time :                  Resist or Assist :                  Comment :                  Done :                                  Exercise 17 Exercise 18 Exercise 19 Exercise 20    Exercise :                  Repetition/Time :                  Resist or Assist :                  Comment :                  Done :                                         Patient Education and Home Exercises     Home Exercises Provided and Patient Education Provided       Written Home Exercises Provided: Patient instructed to cont prior HEP. Exercises were reviewed and Amadeo was able to demonstrate them prior to the end of the session.  Amadeo demonstrated good  understanding of the education provided. See EMR under Patient Instructions for exercises provided during therapy sessions    ASSESSMENT     Amadeo demonstrated positive results from treatment today as evidenced by having infrequent and mild reports of increased pain during session. He is making good progress toward goals as evidenced by improved FOTO score and ability to tolerate increased reps of most as noted exercises within protocol. Moderate verbal and tactile cues required for form. Recommend continue with current POC and progress patient as tolerated.     Amadeo Is progressing well towards his goals.   Pt prognosis is Excellent.     Pt will continue to benefit from skilled outpatient physical therapy to address the deficits listed in the problem list box on initial evaluation, provide pt/family education and to maximize pt's level of independence in the home  and community environment.     Pt's spiritual, cultural and educational needs considered and pt agreeable to plan of care and goals.      Goals: Short Term Goals (4 Weeks):   1. Patient will be compliant with home exercise program to assist therapy in restoring pain free motion of the shoulder.   2. Patient will improve impaired shoulder manual muscle tests 1/2 grade bilaterally to improve strength for functional tasks.  3. Patient will improve PROM R shoulder flexion >/= 155 degrees to improve functional mobility of upper extremities.  4. Patient will improve PROM R shoulder abduction >/=150 degrees to improve functional mobility of upper extremities.      Long Term Goals (8 Weeks):   1. Patient will improve FOTO score to >/= 71 points to demonstrate improvements in R UE carrying, moving, and handling objects  2. Patient will improve impaired shoulder manual muscle tests 1 grade bilaterally to improve strength for household duties.  3. Patient will improve R shoulder flexion to >/= 165 degrees to improve functional mobility of upper extremities.   4. Patient will improve R shoulder external rotation @ 90 degrees abduction  to >/= 70 degrees to improve functional mobility of upper extremities.    PLAN     Outpatient Physical Therapy 3 times weekly for 2 weeks  then 2 times per week for 4 weeks to include the following interventions: Manual Therapy, Neuromuscular Re-ed, Patient Education, Therapeutic Activities, and Therapeutic Exercise.     Noe Madison, PTA

## 2024-05-15 ENCOUNTER — CLINICAL SUPPORT (OUTPATIENT)
Dept: REHABILITATION | Facility: HOSPITAL | Age: 49
End: 2024-05-15
Payer: COMMERCIAL

## 2024-05-15 DIAGNOSIS — S43.431A SUPERIOR GLENOID LABRUM LESION OF RIGHT SHOULDER, INITIAL ENCOUNTER: Primary | ICD-10-CM

## 2024-05-15 PROCEDURE — 97110 THERAPEUTIC EXERCISES: CPT | Mod: CQ

## 2024-05-15 NOTE — PROGRESS NOTES
[facility]   Physical Therapy Treatment Note     Name: Amadeo Jha  [facility] Number: [id number]    Therapy Diagnosis: No diagnosis found.  Physician: Sloane Escobedo PA*    Visit Date: 5/15/2024    Physician Orders: PT Eval and Treat   Medical Diagnosis from Referral: S43.431A - Superior glenoid labrum lesion of the right shoulder , S46.011A - Traumatic Tear of right rotator cuff unspecified tear extent, S46.111D - Labral tear of long head of right biceps tendon  Evaluation Date: 4/25/2024  Authorization Period Expiration: 6/14/24  Plan of Care Expiration: 6/14/24  Progress Note Due: 5/23/24  Date of Surgery: 3/27/24  Visit # / Visits authorized: 3/14  FOTO: 2/ 3     Precautions: Standard and Weightbearing    PTA Visit #: 3/5     Time In: 0830  Time Out: 0935  Total Billable Time: 35 minutes    SUBJECTIVE     Pt reports: no new issues today.   He was compliant with home exercise program    FOTO Score: 55 (5/14/24) improved from 51 on (4/25/24)    Pain: 2/10  Location: anterior aspect of right shoulder      OBJECTIVE     Objective Measures updated at progress report unless specified.     Treatment     Amadeo received the treatments listed below:      therapeutic exercises to develop strength, ROM, and flexibility for 35 minutes including:  Therapeutic Exercise Grid     Exercise 1  Exercise 2  Exercise 3  Exercise 4    Exercise :    Scapular retraction  Table slides  AROM  Right elbow and wrist Rows,extension,     Repetition/Time :    20   20 20 20     Resist or Assist :    5 sec  hold         Red TB     Comment :                 Done :    yes    no  no  yes                     Exercise 5  Exercise 6  Exercise 7  Exercise 8    Exercise :    Lawnmower, Serratus punch, triceps press   Biceps curl   Supine salutes   Supine serratus punch     Repetition/Time :    20 ea   2 x 20   2x10   2x10     Resist or Assist :    Red TB   2 lb           Comment :             Assist      Done :    yes    yes   yes   yes                     Exercise 9  Exercise 10  Exercise 11  Exercise 12    Exercise :    Supine AROM   Supine AAROM-flexion   Low rows   Ice pack     Repetition/Time :    15   15   2x10, 5 ssec   5'     Resist or Assist :                  Comment :    Flexion to <120 deg              Done :    no   yes yes   yes                      Exercise 13 Exercise 14  Exercise 15  Exercise 16   Exercise :                  Repetition/Time :                  Resist or Assist :                  Comment :                  Done :                                  Exercise 17 Exercise 18 Exercise 19 Exercise 20    Exercise :                  Repetition/Time :                  Resist or Assist :                  Comment :                  Done :                                  Patient Education and Home Exercises     Education provided:   - Patient instructed in Right shoulder restrictions ( no active RANGE OF MOTION ) and precautions   - Discussed importance of daily exercises      Written Home Exercises Provided: yes. Exercises were reviewed and Amadeo was able to demonstrate them prior to the end of the session.  Amadeo demonstrated good  understanding of the education provided. See  Patient Instructions for exercises provided during therapy sessions.    ASSESSMENT     Amadeo demonstrated (+) results from treatment today as evidenced by reporting pain relief after icing for 5 minutes after session. He reported an increase in pain during lawnmower exercises and AROM shoulder flexion; activities were ceased. He is making good progress toward goals as evidenced by ability to increase in repetitions for periscapular strengthening exercises and triceps exercises. Moderate tactile cues required for form during serratus press. Recommend continue with current POC and progress patient as tolerated.     Amadeo Is progressing well towards his goals.   Pt prognosis is Excellent.     Pt will continue to benefit from skilled  outpatient physical therapy to address the deficits listed in the problem list box on initial evaluation, provide pt/family education and to maximize pt's level of independence in the home and community environment.     Pt's spiritual, cultural and educational needs considered and pt agreeable to plan of care and goals.      Goals: Short Term Goals (4 Weeks):   1. Patient will be compliant with home exercise program to assist therapy in restoring pain free motion of the shoulder.   2. Patient will improve impaired shoulder manual muscle tests 1/2 grade bilaterally to improve strength for functional tasks.  3. Patient will improve PROM R shoulder flexion >/= 155 degrees to improve functional mobility of upper extremities.  4. Patient will improve PROM R shoulder abduction >/=150 degrees to improve functional mobility of upper extremities.      Long Term Goals (8 Weeks):   1. Patient will improve FOTO score to >/= 71 points to demonstrate improvements in R UE carrying, moving, and handling objects  2. Patient will improve impaired shoulder manual muscle tests 1 grade bilaterally to improve strength for household duties.  3. Patient will improve R shoulder flexion to >/= 165 degrees to improve functional mobility of upper extremities.   4. Patient will improve R shoulder external rotation @ 90 degrees abduction  to >/= 70 degrees to improve functional mobility of upper extremities.    PLAN     Outpatient Physical Therapy 3 times weekly for 2 weeks  then 2 times per week for 4 weeks to include the following interventions: Manual Therapy, Neuromuscular Re-ed, Patient Education, Therapeutic Activities, and Therapeutic Exercise.     Caro Finney PTA

## 2024-05-15 NOTE — PROGRESS NOTES
[facility]   Physical Therapy Treatment Note     Name: Amadeo Jha  [facility] Number: [id number]    Therapy Diagnosis: No diagnosis found.  Physician: Sloane Escobedo PA*    Visit Date: 5/16/2024    Physician Orders: PT Eval and Treat   Medical Diagnosis from Referral: S43.431A - Superior glenoid labrum lesion of the right shoulder , S46.011A - Traumatic Tear of right rotator cuff unspecified tear extent, S46.111D - Labral tear of long head of right biceps tendon  Evaluation Date: 4/25/2024  Authorization Period Expiration: 6/14/24  Plan of Care Expiration: 6/14/24  Progress Note Due: 5/23/24  Date of Surgery: 3/27/24  Visit # / Visits authorized: 3/14  FOTO: 2/ 3     Precautions: Standard and Weightbearing    PTA Visit #: 3/5     Time In: 0830  Time Out: 0935  Total Billable Time: 35 minutes    SUBJECTIVE     Pt reports: no new issues today.   He was compliant with home exercise program    FOTO Score: 55 (5/14/24) improved from 51 on (4/25/24)    Pain: 2/10  Location: anterior aspect of right shoulder      OBJECTIVE     Objective Measures updated at progress report unless specified.     Treatment     Amadeo received the treatments listed below:      therapeutic exercises to develop strength, ROM, and flexibility for 35 minutes including:  Therapeutic Exercise Grid     Exercise 1  Exercise 2  Exercise 3  Exercise 4    Exercise :    Scapular retraction  Table slides  AROM  Right elbow and wrist Rows,extension,     Repetition/Time :    20   20 20 20     Resist or Assist :    5 sec  hold         Red TB     Comment :                 Done :    yes    no  no  yes                     Exercise 5  Exercise 6  Exercise 7  Exercise 8    Exercise :    Lawnmower, Serratus punch, triceps press   Biceps curl   Supine salutes   Supine serratus punch     Repetition/Time :    20 ea   2 x 20   2x10   2x10     Resist or Assist :    Red TB   2 lb           Comment :             Assist      Done :    yes    yes   yes   yes                     Exercise 9  Exercise 10  Exercise 11  Exercise 12    Exercise :    Supine AROM   Supine AAROM-flexion   Low rows   Ice pack     Repetition/Time :    15   15   2x10, 5 ssec   5'     Resist or Assist :                  Comment :    Flexion to <120 deg              Done :    no   yes yes   yes                      Exercise 13 Exercise 14  Exercise 15  Exercise 16   Exercise :                  Repetition/Time :                  Resist or Assist :                  Comment :                  Done :                                  Exercise 17 Exercise 18 Exercise 19 Exercise 20    Exercise :                  Repetition/Time :                  Resist or Assist :                  Comment :                  Done :                                  Patient Education and Home Exercises     Education provided:   - Patient instructed in Right shoulder restrictions ( no active RANGE OF MOTION ) and precautions   - Discussed importance of daily exercises      Written Home Exercises Provided: yes. Exercises were reviewed and Amadeo was able to demonstrate them prior to the end of the session.  Amadeo demonstrated good  understanding of the education provided. See  Patient Instructions for exercises provided during therapy sessions.    ASSESSMENT     Amadeo demonstrated (+) results from treatment today as evidenced by reporting pain relief after icing for 5 minutes after session. He reported an increase in pain during lawnmower exercises and AROM shoulder flexion; activities were ceased. He is making good progress toward goals as evidenced by ability to increase in repetitions for periscapular strengthening exercises and triceps exercises. Moderate tactile cues required for form during serratus press. Recommend continue with current POC and progress patient as tolerated.     Amadeo Is progressing well towards his goals.   Pt prognosis is Excellent.     Pt will continue to benefit from skilled  outpatient physical therapy to address the deficits listed in the problem list box on initial evaluation, provide pt/family education and to maximize pt's level of independence in the home and community environment.     Pt's spiritual, cultural and educational needs considered and pt agreeable to plan of care and goals.      Goals: Short Term Goals (4 Weeks):   1. Patient will be compliant with home exercise program to assist therapy in restoring pain free motion of the shoulder.   2. Patient will improve impaired shoulder manual muscle tests 1/2 grade bilaterally to improve strength for functional tasks.  3. Patient will improve PROM R shoulder flexion >/= 155 degrees to improve functional mobility of upper extremities.  4. Patient will improve PROM R shoulder abduction >/=150 degrees to improve functional mobility of upper extremities.      Long Term Goals (8 Weeks):   1. Patient will improve FOTO score to >/= 71 points to demonstrate improvements in R UE carrying, moving, and handling objects  2. Patient will improve impaired shoulder manual muscle tests 1 grade bilaterally to improve strength for household duties.  3. Patient will improve R shoulder flexion to >/= 165 degrees to improve functional mobility of upper extremities.   4. Patient will improve R shoulder external rotation @ 90 degrees abduction  to >/= 70 degrees to improve functional mobility of upper extremities.    PLAN     Outpatient Physical Therapy 3 times weekly for 2 weeks  then 2 times per week for 4 weeks to include the following interventions: Manual Therapy, Neuromuscular Re-ed, Patient Education, Therapeutic Activities, and Therapeutic Exercise.     Noe Madison, PTA

## 2024-05-16 ENCOUNTER — CLINICAL SUPPORT (OUTPATIENT)
Dept: REHABILITATION | Facility: HOSPITAL | Age: 49
End: 2024-05-16
Payer: COMMERCIAL

## 2024-05-16 DIAGNOSIS — S46.011A TRAUMATIC TEAR OF RIGHT ROTATOR CUFF, UNSPECIFIED TEAR EXTENT, INITIAL ENCOUNTER: ICD-10-CM

## 2024-05-16 DIAGNOSIS — S43.431A SUPERIOR GLENOID LABRUM LESION OF RIGHT SHOULDER, INITIAL ENCOUNTER: Primary | ICD-10-CM

## 2024-05-16 DIAGNOSIS — S46.111D LABRAL TEAR OF LONG HEAD OF RIGHT BICEPS TENDON, SUBSEQUENT ENCOUNTER: ICD-10-CM

## 2024-05-16 PROCEDURE — 97110 THERAPEUTIC EXERCISES: CPT | Mod: CQ

## 2024-05-16 NOTE — PROGRESS NOTES
[facility]   Physical Therapy Treatment Note     Name: Amadeo Jha  Clinic Number: [id number]    Therapy Diagnosis:        Encounter Diagnoses   Name Primary?    Superior glenoid labrum lesion of right shoulder, initial encounter      Traumatic tear of right rotator cuff, unspecified tear extent, initial encounter      Labral tear of long head of right biceps tendon, subsequent encounter      Decreased ROM of right shoulder Yes    Decreased strength of upper extremity      Acute pain of right shoulder      Impingement syndrome of right shoulder      Physician: Sloane Escobedo PA*    Visit Date: 5/16/2024    Physician Orders: PT Eval and Treat   Medical Diagnosis from Referral: S43.431A - Superior glenoid labrum lesion of the right shoulder , S46.011A - Traumatic Tear of right rotator cuff unspecified tear extent, S46.111D - Labral tear of long head of right biceps tendon  Evaluation Date: 4/25/2024  Authorization Period Expiration: 6/14/24  Plan of Care Expiration: 6/14/24  Progress Note Due: 5/23/24  Date of Surgery: 3/27/24  Visit # / Visits authorized: 4/14  FOTO: 2/ 3     Precautions: Standard and Weightbearing    PTA Visit #: 4/5     Time In: 1035  Time Out: 1100  Total Billable Time: 25 minutes    SUBJECTIVE     Pt reports: he has been using the right arm a lot recently and having to reach overhead for things.   He was compliant with home exercise program    FOTO Score: 55 (5/14/24) improved from 51 on (4/25/24)    Pain: 5/10 with meds  Location: anterior aspect of right shoulder      OBJECTIVE     Objective Measures updated at progress report unless specified.     Treatment     Amadeo received the treatments listed below:      therapeutic exercises to develop strength, ROM, and flexibility for 25 minutes including:  Therapeutic Exercise Grid     Exercise 1  Exercise 2  Exercise 3  Exercise 4    Exercise :    Scapular retraction  Table slides  AROM  Right elbow and wrist  Rows,extension,     Repetition/Time :    20   20 20 20     Resist or Assist :    5 sec  hold         Red TB     Comment :                 Done :    yes    no  no  yes                     Exercise 5  Exercise 6  Exercise 7  Exercise 8    Exercise :    Lawnmower, Serratus punch, triceps press   Biceps curl   Supine salutes   Supine serratus punch     Repetition/Time :    15 ea   2 x 20   2x10   2x10     Resist or Assist :    Red TB   2 lb           Comment :    Tricep press only         Assist      Done :    yes   yes   yes   yes                     Exercise 9  Exercise 10  Exercise 11  Exercise 12    Exercise :    Supine AROM   Supine AAROM-flexion   Low rows   Ice pack     Repetition/Time :    10   10   15, 5 ssec   5'     Resist or Assist :                  Comment :    Flexion to 90 deg              Done :    yes   yes yes                       Exercise 13 Exercise 14  Exercise 15  Exercise 16   Exercise :                  Repetition/Time :                  Resist or Assist :                  Comment :                  Done :                                  Exercise 17 Exercise 18 Exercise 19 Exercise 20    Exercise :                  Repetition/Time :                  Resist or Assist :                  Comment :                  Done :                                  Patient Education and Home Exercises     Education provided:   - Patient instructed in Right shoulder restrictions ( no active RANGE OF MOTION ) and precautions   - Discussed importance of daily exercises      Written Home Exercises Provided: yes. Exercises were reviewed and Amadeo was able to demonstrate them prior to the end of the session.  Amadeo demonstrated good  understanding of the education provided. See  Patient Instructions for exercises provided during therapy sessions.    ASSESSMENT     Amadeo demonstrated (fair to poor) results from treatment today as evidenced by inability to complete all repetitions and report of pain during AROM of  right shoulder and scapular exercises. Exercises were modified and reps were decreased. He is making fair progress toward goals as evidenced by decrease in reps during PT session.   Moderate verbal and tactile cues required to isolate specific muscles and facilitate contraction of muscles for proper execution of exercises.  He continues to exhibit deficits with periscapular strength, overcompensation of certain muscles, AROM, and stability of R shoulder. Educated patient on post op protocol. Recommend continue with current POC.     Amadeo Is progressing well towards his goals.   Pt prognosis is Excellent.     Pt will continue to benefit from skilled outpatient physical therapy to address the deficits listed in the problem list box on initial evaluation, provide pt/family education and to maximize pt's level of independence in the home and community environment.     Pt's spiritual, cultural and educational needs considered and pt agreeable to plan of care and goals.      Goals: Short Term Goals (4 Weeks):   1. Patient will be compliant with home exercise program to assist therapy in restoring pain free motion of the shoulder.   2. Patient will improve impaired shoulder manual muscle tests 1/2 grade bilaterally to improve strength for functional tasks.  3. Patient will improve PROM R shoulder flexion >/= 155 degrees to improve functional mobility of upper extremities.  4. Patient will improve PROM R shoulder abduction >/=150 degrees to improve functional mobility of upper extremities.      Long Term Goals (8 Weeks):   1. Patient will improve FOTO score to >/= 71 points to demonstrate improvements in R UE carrying, moving, and handling objects  2. Patient will improve impaired shoulder manual muscle tests 1 grade bilaterally to improve strength for household duties.  3. Patient will improve R shoulder flexion to >/= 165 degrees to improve functional mobility of upper extremities.   4. Patient will improve R shoulder  external rotation @ 90 degrees abduction  to >/= 70 degrees to improve functional mobility of upper extremities.    PLAN     Outpatient Physical Therapy 3 times weekly for 2 weeks  then 2 times per week for 4 weeks to include the following interventions: Manual Therapy, Neuromuscular Re-ed, Patient Education, Therapeutic Activities, and Therapeutic Exercise.     Caro Finney, PTA

## 2024-05-21 ENCOUNTER — CLINICAL SUPPORT (OUTPATIENT)
Dept: REHABILITATION | Facility: HOSPITAL | Age: 49
End: 2024-05-21
Payer: COMMERCIAL

## 2024-05-21 DIAGNOSIS — S43.431A SUPERIOR GLENOID LABRUM LESION OF RIGHT SHOULDER, INITIAL ENCOUNTER: Primary | ICD-10-CM

## 2024-05-21 PROCEDURE — 97110 THERAPEUTIC EXERCISES: CPT | Mod: CQ

## 2024-05-21 NOTE — PROGRESS NOTES
[facility]   Physical Therapy Treatment Note     Name: Amadeo Jha  Clinic Number: [id number]    Therapy Diagnosis:        Encounter Diagnoses   Name Primary?    Superior glenoid labrum lesion of right shoulder, initial encounter      Traumatic tear of right rotator cuff, unspecified tear extent, initial encounter      Labral tear of long head of right biceps tendon, subsequent encounter      Decreased ROM of right shoulder Yes    Decreased strength of upper extremity      Acute pain of right shoulder      Impingement syndrome of right shoulder      Physician: Sloane Escobedo PA*    Visit Date: 5/21/2024    Physician Orders: PT Eval and Treat   Medical Diagnosis from Referral: S43.431A - Superior glenoid labrum lesion of the right shoulder , S46.011A - Traumatic Tear of right rotator cuff unspecified tear extent, S46.111D - Labral tear of long head of right biceps tendon  Evaluation Date: 4/25/2024  Authorization Period Expiration: 6/14/24  Plan of Care Expiration: 6/14/24  Progress Note Due: 5/23/24  Date of Surgery: 3/27/24  Visit # / Visits authorized: 4/14  FOTO: 2/ 3     Precautions: Standard and Weightbearing    PTA Visit #: 5/5     Time In: 0803  Time Out: 0830  Total Billable Time: 27 minutes    SUBJECTIVE     Pt reports: he is feeling a lot better today. States the increased pain last session may have been because he had three therapy sessions back to back..   He was compliant with home exercise program    FOTO Score: 55 (5/14/24) improved from 51 on (4/25/24)    Pain: 2/10 with meds  Location: anterior aspect of right shoulder      OBJECTIVE     Objective Measures updated at progress report unless specified.     Treatment     Amadeo received the treatments listed below:      therapeutic exercises to develop strength, ROM, and flexibility for 27 minutes including:  Therapeutic Exercise Grid     Exercise 1  Exercise 2  Exercise 3  Exercise 4    Exercise :    Scapular  retraction  Table slides  AROM  Right elbow and wrist Rows,extension,     Repetition/Time :    20   20 20 20     Resist or Assist :    5 sec  hold         Red TB     Comment :                 Done :    yes    no  no  yes                     Exercise 5  Exercise 6  Exercise 7  Exercise 8    Exercise :     triceps press   Biceps curl   Supine salutes   Supine serratus punch     Repetition/Time :    15 ea   2 x 25   2x10   2x10     Resist or Assist :    Red TB   2 lb           Comment :    Tricep press only              Done :    yes   yes   yes   yes                     Exercise 9  Exercise 10  Exercise 11  Exercise 12    Exercise :    Supine AROM   Supine AAROM-flexion   Low rows   Ice pack     Repetition/Time :    10   2 x 10   20 x 5 ssec   5'     Resist or Assist :                  Comment :    Flexion to 90 deg              Done :    yes   yes yes                       Exercise 13 Exercise 14  Exercise 15  Exercise 16   Exercise :                  Repetition/Time :                  Resist or Assist :                  Comment :                  Done :                                  Exercise 17 Exercise 18 Exercise 19 Exercise 20    Exercise :                  Repetition/Time :                  Resist or Assist :                  Comment :                  Done :                                  Patient Education and Home Exercises     Education provided:   - Patient instructed in Right shoulder restrictions ( no active RANGE OF MOTION ) and precautions   - Discussed importance of daily exercises      Written Home Exercises Provided: yes. Exercises were reviewed and Amadeo was able to demonstrate them prior to the end of the session.  Amadeo demonstrated good  understanding of the education provided. See  Patient Instructions for exercises provided during therapy sessions.    ASSESSMENT     Amadeo demonstrated positive results after treatment today as evidenced by ability to complete all repetitions with report of  only mild increased pain with R shoulder flexion to 90 degrees in supine. He is making good progress toward goals as evidenced by ability to resume exercises at previous intensity with minimal report of pain. Moderate verbal and tactile cues required to isolate specific muscles and facilitate contraction of muscles for proper execution of exercises.  He continues to exhibit deficits with periscapular strength, overcompensation of certain muscles, AROM, and stability of R shoulder. Educated patient on post op protocol. Recommend continue with current POC, and increase resistance next session.    Amadeo Is progressing well towards his goals.   Pt prognosis is Excellent.     Pt will continue to benefit from skilled outpatient physical therapy to address the deficits listed in the problem list box on initial evaluation, provide pt/family education and to maximize pt's level of independence in the home and community environment.     Pt's spiritual, cultural and educational needs considered and pt agreeable to plan of care and goals.      Goals: Short Term Goals (4 Weeks):   1. Patient will be compliant with home exercise program to assist therapy in restoring pain free motion of the shoulder.   2. Patient will improve impaired shoulder manual muscle tests 1/2 grade bilaterally to improve strength for functional tasks.  3. Patient will improve PROM R shoulder flexion >/= 155 degrees to improve functional mobility of upper extremities.  4. Patient will improve PROM R shoulder abduction >/=150 degrees to improve functional mobility of upper extremities.      Long Term Goals (8 Weeks):   1. Patient will improve FOTO score to >/= 71 points to demonstrate improvements in R UE carrying, moving, and handling objects  2. Patient will improve impaired shoulder manual muscle tests 1 grade bilaterally to improve strength for household duties.  3. Patient will improve R shoulder flexion to >/= 165 degrees to improve functional  mobility of upper extremities.   4. Patient will improve R shoulder external rotation @ 90 degrees abduction  to >/= 70 degrees to improve functional mobility of upper extremities.    PLAN     Outpatient Physical Therapy 3 times weekly for 2 weeks  then 2 times per week for 4 weeks to include the following interventions: Manual Therapy, Neuromuscular Re-ed, Patient Education, Therapeutic Activities, and Therapeutic Exercise.     Noe Madison, PTA

## 2024-05-22 ENCOUNTER — DOCUMENTATION ONLY (OUTPATIENT)
Dept: REHABILITATION | Facility: HOSPITAL | Age: 49
End: 2024-05-22
Payer: COMMERCIAL

## 2024-05-22 NOTE — PROGRESS NOTES
Performed face to face conference with Caro Finney PTA, regarding pt's POC and progress thus far

## 2024-05-23 ENCOUNTER — CLINICAL SUPPORT (OUTPATIENT)
Dept: REHABILITATION | Facility: HOSPITAL | Age: 49
End: 2024-05-23
Payer: COMMERCIAL

## 2024-05-23 ENCOUNTER — DOCUMENTATION ONLY (OUTPATIENT)
Dept: REHABILITATION | Facility: HOSPITAL | Age: 49
End: 2024-05-23

## 2024-05-23 DIAGNOSIS — M25.511 ACUTE PAIN OF RIGHT SHOULDER: ICD-10-CM

## 2024-05-23 DIAGNOSIS — S43.431A SUPERIOR GLENOID LABRUM LESION OF RIGHT SHOULDER, INITIAL ENCOUNTER: ICD-10-CM

## 2024-05-23 DIAGNOSIS — R29.898 DECREASED STRENGTH OF UPPER EXTREMITY: ICD-10-CM

## 2024-05-23 DIAGNOSIS — M75.41 IMPINGEMENT SYNDROME OF RIGHT SHOULDER: ICD-10-CM

## 2024-05-23 DIAGNOSIS — M25.611 DECREASED ROM OF RIGHT SHOULDER: Primary | ICD-10-CM

## 2024-05-23 DIAGNOSIS — S46.011A TRAUMATIC COMPLETE TEAR OF RIGHT ROTATOR CUFF, INITIAL ENCOUNTER: ICD-10-CM

## 2024-05-23 PROCEDURE — 97110 THERAPEUTIC EXERCISES: CPT

## 2024-05-23 NOTE — PROGRESS NOTES
[facility]   Reassessment / Physical Therapy Treatment Note     Name: Amadeo Jha  Clinic Number: [id number]    Therapy Diagnosis:        Encounter Diagnoses   Name Primary?    Superior glenoid labrum lesion of right shoulder, initial encounter      Traumatic tear of right rotator cuff, unspecified tear extent, initial encounter      Labral tear of long head of right biceps tendon, subsequent encounter      Decreased ROM of right shoulder Yes    Decreased strength of upper extremity      Acute pain of right shoulder      Impingement syndrome of right shoulder      Physician: Sloane Escobedo PA*    Visit Date: 5/23/2024    Physician Orders: PT Eval and Treat   Medical Diagnosis from Referral: S43.431A - Superior glenoid labrum lesion of the right shoulder , S46.011A - Traumatic Tear of right rotator cuff unspecified tear extent, S46.111D - Labral tear of long head of right biceps tendon  Evaluation Date: 4/25/2024  Authorization Period Expiration: 6/14/24  Plan of Care Expiration: 6/14/24  Reassessment / Plan of Care completed: RA- 5/23/24  Next Reassessment / Plan of Care due: 6/4/24  Date of Surgery: 3/27/24  Visit # / Visits authorized: 6/14  FOTO: 2/ 3     Precautions: Standard and Weightbearing    PTA Visit #: 0/5     Time In: 1100  Time Out: 1130  Total Billable Time: 30 minutes    SUBJECTIVE     Pt reports: he is ready to use his R arm without limitations. Next follow-up appointment with orthopedic MD is 7/3/24.   He was compliant with home exercise program    FOTO Score: 55 (5/14/24) improved from 51 on (4/25/24)    Pain: 2/10 with meds  Location: anterior aspect of right shoulder      OBJECTIVE     Range of Motion/Strength:      Shoulder Right Pain/Dysfunction with Movement   PROM       flexion 145 deg     extension WFL     abduction 130 deg     adduction WFL     Internal rotation 90 deg     ER at 90° abd 80 deg                 U/E MMT Right Left Pain/Dysfunction with  Movement   Shoulder Flexion 3-/5 5/5     Shoulder Extension 3+/5 5/5     Shoulder Abduction 3-/5 5/5     Shoulder Adduction 3+/5 5/5     Shoulder Internal Rotation 3+/5 5/5     Shoulder External Rotation 3-/5 5/5     Shoulder ER  @ 90* Abduction x 5/5     Elbow Flexion  3+/5 5/5     Elbow Extension 3+/5 5/5                            Treatment     Amadeo received the treatments listed below:      therapeutic exercises to develop strength, ROM, and flexibility for 30 minutes including:    Therapeutic Exercise Grid     Exercise 1  Exercise 2  Exercise 3  Exercise 4    Exercise :    Scapular retraction  Table slides  TB: adduction,  Lawnmower,  Serratus punches TB: Rows,  extension     Repetition/Time :    20   20 20 20     Resist or Assist :    5 sec  hold      Red TB Red TB     Comment :                Done :    no no  yes yes                     Exercise 5  Exercise 6  Exercise 7  Exercise 8    Exercise :    TB: triceps press   Biceps curl   Supine salutes   Supine serratus punch     Repetition/Time :    20  25 with each resistance   20 20   Resist or Assist :    Red TB   3 lb  Red TB           Comment :                Done :    yes   yes   yes   yes                     Exercise 9  Exercise 10  Exercise 11  Exercise 12    Exercise :    Supine AROM with dowel   R ER (in left side-lying) Low rows   Ice pack     Repetition/Time :    20   20 20 x 5 ssec   5'     Resist or Assist :                Comment :              Done :    yes   yes yes   no                    Exercise 13 Exercise 14  Exercise 15  Exercise 16   Exercise :                  Repetition/Time :                  Resist or Assist :                  Comment :                  Done :                                  Exercise 17 Exercise 18 Exercise 19 Exercise 20    Exercise :                  Repetition/Time :                  Resist or Assist :                  Comment :                  Done :                                  Patient Education and Home  Exercises     Education provided:   - progress POC per MD orders on last appointment on 5/8/24 including physical therapy to work on gaining strength    - Discussed importance of daily exercises      Written Home Exercises Provided: yes. Exercises were reviewed and Amadeo was able to demonstrate them prior to the end of the session.  Amadeo demonstrated good  understanding of the education provided. See  Patient Instructions for exercises provided during therapy sessions.    ASSESSMENT     Pt has completed 6 PT visits since initial eval. Pt progressing well with reduction of R shoulder pain, R shoulder ROM and strength, and overall use of R UE with daily activities based on improvements with functional shoulder FOTO score. Discussed continued limitations due to protocol following surgery due to pt s/p 8 weeks at this point. Pt able to perform all R shoulder strengthening exercises using light resistance with increased reps. New upgraded written HEP issued to continue outside of therapy. Pt to continue with current POC, progress per pt's tolerance, and reassess pt at later date to determine need for additional therapy    Amadeo Is progressing well towards his goals.   Pt prognosis is Excellent.     Pt will continue to benefit from skilled outpatient physical therapy to address the deficits listed in the problem list box on initial evaluation, provide pt/family education and to maximize pt's level of independence in the home and community environment.     Pt's spiritual, cultural and educational needs considered and pt agreeable to plan of care and goals.    Goals: Short Term Goals (4 Weeks):     1. Patient will be compliant with home exercise program to assist therapy in restoring pain free motion of the shoulder. - progressing  2. Patient will improve impaired shoulder manual muscle tests 1/2 grade bilaterally to improve strength for functional tasks.- met (R shoulder 3-/5 to 3+/5 MMT throughout)  3. Patient will  improve PROM R shoulder flexion >/= 155 degrees to improve functional mobility of upper extremities.- progressing (145 degrees)  4. Patient will improve PROM R shoulder abduction >/=150 degrees to improve functional mobility of upper extremities. - progressing (130 degrees)     Long Term Goals (8 Weeks):     1. Patient will improve FOTO score to >/= 71 points to demonstrate improvements in R UE carrying, moving, and handling objects- progressing (55 (5/14/24) improved from 51 on (4/25/24))  2. Patient will improve impaired shoulder manual muscle tests 1 grade bilaterally to improve strength for household duties.- progressing (R shoulder 3-/5 to 3+/5 MMT throughout)  3. Patient will improve R shoulder flexion to >/= 165 degrees to improve functional mobility of upper extremities. - progressing (145 degrees)  4. Patient will improve R shoulder external rotation @ 90 degrees abduction  to >/= 70 degrees to improve functional mobility of upper extremities.- met (80 degrees)    PLAN      Pt to continue with current POC, progress per pt's tolerance, and reassess pt at later date to determine need for additional therapy    Outpatient Physical Therapy 3 times weekly for 2 weeks  then 2 times per week for 4 weeks to include the following interventions: Manual Therapy, Neuromuscular Re-ed, Patient Education, Therapeutic Activities, and Therapeutic Exercise.     Lanie Saunders, PT

## 2024-05-23 NOTE — PROGRESS NOTES
Performed face to face conference with Noe Madison PTA, regarding pt's POC and progress thus far

## 2024-05-28 ENCOUNTER — CLINICAL SUPPORT (OUTPATIENT)
Dept: REHABILITATION | Facility: HOSPITAL | Age: 49
End: 2024-05-28
Payer: COMMERCIAL

## 2024-05-28 DIAGNOSIS — M25.611 DECREASED ROM OF RIGHT SHOULDER: Primary | ICD-10-CM

## 2024-05-28 DIAGNOSIS — R29.898 DECREASED STRENGTH OF UPPER EXTREMITY: ICD-10-CM

## 2024-05-28 PROCEDURE — 97110 THERAPEUTIC EXERCISES: CPT | Mod: CQ

## 2024-05-28 NOTE — PROGRESS NOTES
[facility]   Physical Therapy Treatment Note     Name: Amadeo Jha  Clinic Number: [id number]    Therapy Diagnosis:        Encounter Diagnoses   Name Primary?    Superior glenoid labrum lesion of right shoulder, initial encounter      Traumatic tear of right rotator cuff, unspecified tear extent, initial encounter      Labral tear of long head of right biceps tendon, subsequent encounter      Decreased ROM of right shoulder Yes    Decreased strength of upper extremity      Acute pain of right shoulder      Impingement syndrome of right shoulder      Physician: Sloane Escobedo PA*    Visit Date: 5/28/2024    Physician Orders: PT Eval and Treat   Medical Diagnosis from Referral: S43.431A - Superior glenoid labrum lesion of the right shoulder , S46.011A - Traumatic Tear of right rotator cuff unspecified tear extent, S46.111D - Labral tear of long head of right biceps tendon  Evaluation Date: 4/25/2024  Authorization Period Expiration: 6/14/24  Plan of Care Expiration: 6/14/24  Reassessment / Plan of Care completed: RA- 5/23/24  Next Reassessment / Plan of Care due: 6/4/24  Date of Surgery: 3/27/24  Visit # / Visits authorized: 7/14  FOTO: 2/ 3     Precautions: Standard and Weightbearing    PTA Visit #: 1/5     Time In: 0830  Time Out: 0835  Total Billable Time: 35 minutes    SUBJECTIVE     Pt reports: he had a busy weekend due to a lot of graduation dinners. Did  heavy pans of fried chicken.   He was compliant with home exercise program    FOTO Score: 67 (5/28/24) improved from 55 (5/14/24) improved from 51 on (4/25/24)    Pain: 4/10 no meds  Location: anterior aspect of right shoulder      OBJECTIVE     N/A       Treatment     Amadeo received the treatments listed below:      therapeutic exercises to develop strength, ROM, and flexibility for 35 minutes including:    Therapeutic Exercise Grid     Exercise 1  Exercise 2  Exercise 3  Exercise 4    Exercise :    Scapular  retraction  Table slides  TB: adduction,  Lawnmower,  Serratus punches TB: Rows,  extension     Repetition/Time :    20   20 20 20     Resist or Assist :    5 sec  hold      Red TB Red TB     Comment :          No lawnmower      Done :    no no  yes yes                     Exercise 5  Exercise 6  Exercise 7  Exercise 8    Exercise :    TB: triceps press   Biceps curl   Supine salutes   Supine serratus punch     Repetition/Time :    20  25 with each resistance   20 20   Resist or Assist :    Red TB   3 lb  Red TB           Comment :                Done :    yes   yes   yes   yes                     Exercise 9  Exercise 10  Exercise 11  Exercise 12    Exercise :    Supine AAROM with dowel   R ER (in left side-lying) Low rows   Ice pack     Repetition/Time :    20   20 20 x 5 ssec   5'     Resist or Assist :                Comment :              Done :    yes   yes yes   no                    Exercise 13 Exercise 14  Exercise 15  Exercise 16   Exercise :    Reverse pendulums              Repetition/Time :                  Resist or Assist :                  Comment :                  Done :    yes                              Exercise 17 Exercise 18 Exercise 19 Exercise 20    Exercise :                  Repetition/Time :                  Resist or Assist :                  Comment :                  Done :                                  Patient Education and Home Exercises     Education provided:   - progress POC per MD orders on last appointment on 5/8/24 including physical therapy to work on gaining strength    - Discussed importance of daily exercises      Written Home Exercises Provided: yes. Exercises were reviewed and Amadeo was able to demonstrate them prior to the end of the session.  Amadeo demonstrated good  understanding of the education provided. See  Patient Instructions for exercises provided during therapy sessions.    ASSESSMENT     Amadeo demonstrated a fair outcome after today's session as evidenced by  report of mild muscle ache in the upper/middle traps during exercises which is an expected response. He required moderate verbal and tactile cues for proper execution and to isolate specific muscles during mobility. He is making a good progression toward goals as evidenced by improvement in functional shoulder FOTO score (see above). Pt asking when he can start lifting overhead. Educated pt on 9 weeks post op protocol and precautions within phase 2 with verbal understanding. He continues to demonstrate deficits with right shoulder strength, stability, ROM, periscapular mobility/strength, and pain with activity. Recommend pt continue with POC and HEP to address deficits above.     Amadeo Is progressing well towards his goals.   Pt prognosis is Excellent.     Pt will continue to benefit from skilled outpatient physical therapy to address the deficits listed in the problem list box on initial evaluation, provide pt/family education and to maximize pt's level of independence in the home and community environment.     Pt's spiritual, cultural and educational needs considered and pt agreeable to plan of care and goals.    Goals: Short Term Goals (4 Weeks):     1. Patient will be compliant with home exercise program to assist therapy in restoring pain free motion of the shoulder. - progressing  2. Patient will improve impaired shoulder manual muscle tests 1/2 grade bilaterally to improve strength for functional tasks.- met (R shoulder 3-/5 to 3+/5 MMT throughout)  3. Patient will improve PROM R shoulder flexion >/= 155 degrees to improve functional mobility of upper extremities.- progressing (145 degrees)  4. Patient will improve PROM R shoulder abduction >/=150 degrees to improve functional mobility of upper extremities. - progressing (130 degrees)     Long Term Goals (8 Weeks):     1. Patient will improve FOTO score to >/= 71 points to demonstrate improvements in R UE carrying, moving, and handling objects- progressing  (55 (5/14/24) improved from 51 on (4/25/24))  2. Patient will improve impaired shoulder manual muscle tests 1 grade bilaterally to improve strength for household duties.- progressing (R shoulder 3-/5 to 3+/5 MMT throughout)  3. Patient will improve R shoulder flexion to >/= 165 degrees to improve functional mobility of upper extremities. - progressing (145 degrees)  4. Patient will improve R shoulder external rotation @ 90 degrees abduction  to >/= 70 degrees to improve functional mobility of upper extremities.- met (80 degrees)    PLAN      Pt to continue with current POC, progress per pt's tolerance, and reassess pt at later date to determine need for additional therapy    Outpatient Physical Therapy 3 times weekly for 2 weeks  then 2 times per week for 4 weeks to include the following interventions: Manual Therapy, Neuromuscular Re-ed, Patient Education, Therapeutic Activities, and Therapeutic Exercise.     Caro Finney, PTA

## 2024-05-29 NOTE — PROGRESS NOTES
[facility]   Physical Therapy Treatment Note     Name: Amadeo Jha  Clinic Number: [id number]    Therapy Diagnosis:        Encounter Diagnoses   Name Primary?    Superior glenoid labrum lesion of right shoulder, initial encounter      Traumatic tear of right rotator cuff, unspecified tear extent, initial encounter      Labral tear of long head of right biceps tendon, subsequent encounter      Decreased ROM of right shoulder Yes    Decreased strength of upper extremity      Acute pain of right shoulder      Impingement syndrome of right shoulder      Physician: Sloane Escobedo PA*    Visit Date: 5/30/2024    Physician Orders: PT Eval and Treat   Medical Diagnosis from Referral: S43.431A - Superior glenoid labrum lesion of the right shoulder , S46.011A - Traumatic Tear of right rotator cuff unspecified tear extent, S46.111D - Labral tear of long head of right biceps tendon  Evaluation Date: 4/25/2024  Authorization Period Expiration: 6/14/24  Plan of Care Expiration: 6/14/24  Reassessment / Plan of Care completed: RA- 5/23/24  Next Reassessment / Plan of Care due: 6/4/24  Date of Surgery: 3/27/24  Visit # / Visits authorized: 8/14  FOTO: 2/ 3     Precautions: Standard and Weightbearing    PTA Visit #: 2/5     Time In: 0800  Time Out: 0835  Total Billable Time: 35 minutes    SUBJECTIVE     Pt reports: his pain level is lower today.    He was compliant with home exercise program    FOTO Score: 67 (5/28/24) improved from 55 (5/14/24) improved from 51 on (4/25/24)    Pain: 2/10 no meds  Location: anterior aspect of right shoulder      OBJECTIVE     N/A       Treatment     Amadeo received the treatments listed below:      therapeutic exercises to develop strength, ROM, and flexibility for 35 minutes including:    Therapeutic Exercise Grid     Exercise 1  Exercise 2  Exercise 3  Exercise 4    Exercise :    R shoulder Isometrics in all planes  Table slides  TB: adduction,  Serratus punches  TB: Rows,  extension     Repetition/Time :    10 x 5 sec   20 20 20     Resist or Assist :    5 sec  hold      GreenTB Green TB     Comment :                Done :    yes no  yes yes                     Exercise 5  Exercise 6  Exercise 7  Exercise 8    Exercise :    TB: triceps press   Biceps curl   Supine salutes   Supine serratus punch     Repetition/Time :    20  25   20 20   Resist or Assist :    Green TB   3 lb           Comment :                Done :    yes   yes   yes   yes                     Exercise 9  Exercise 10  Exercise 11  Exercise 12    Exercise :    Supine AAROM with dowel   R ER (in left side-lying) Low rows   Ice pack     Repetition/Time :    20   20 20 x 5 ssec   5'     Resist or Assist :                Comment :              Done :    yes   yes no   no                    Exercise 13 Exercise 14  Exercise 15  Exercise 16   Exercise :    Reverse pendulums              Repetition/Time :                  Resist or Assist :                  Comment :                  Done :    yes                              Exercise 17 Exercise 18 Exercise 19 Exercise 20    Exercise :                  Repetition/Time :                  Resist or Assist :                  Comment :                  Done :                                  Patient Education and Home Exercises     Education provided:   - progress POC per MD orders on last appointment on 5/8/24 including physical therapy to work on gaining strength    - Discussed importance of daily exercises      Written Home Exercises Provided: yes. Exercises were reviewed and Amadeo was able to demonstrate them prior to the end of the session.  Amadeo demonstrated good  understanding of the education provided. See  Patient Instructions for exercises provided during therapy sessions.    ASSESSMENT     Amadeo demonstrated a positive outcome after today's session as evidenced by only mild report of increased pain in the anterior shoulder after session. He required  moderate verbal and tactile cues for proper execution and to isolate specific muscles during mobility. He is making a good progression toward goals as evidenced by good tolerance with addition of R shoulder isometric strengthening exercises. Pt again asking when he can start lifting overhead. Educated pt on 9 weeks post op protocol and precautions within phase 2 with verbal understanding. He continues to demonstrate deficits with right shoulder strength, stability, ROM, periscapular mobility/strength, and pain with activity. Recommend pt continue with POC and HEP to address deficits above.     Amadeo Is progressing well towards his goals.   Pt prognosis is Excellent.     Pt will continue to benefit from skilled outpatient physical therapy to address the deficits listed in the problem list box on initial evaluation, provide pt/family education and to maximize pt's level of independence in the home and community environment.     Pt's spiritual, cultural and educational needs considered and pt agreeable to plan of care and goals.    Goals: Short Term Goals (4 Weeks):     1. Patient will be compliant with home exercise program to assist therapy in restoring pain free motion of the shoulder. - progressing  2. Patient will improve impaired shoulder manual muscle tests 1/2 grade bilaterally to improve strength for functional tasks.- met (R shoulder 3-/5 to 3+/5 MMT throughout)  3. Patient will improve PROM R shoulder flexion >/= 155 degrees to improve functional mobility of upper extremities.- progressing (145 degrees)  4. Patient will improve PROM R shoulder abduction >/=150 degrees to improve functional mobility of upper extremities. - progressing (130 degrees)     Long Term Goals (8 Weeks):     1. Patient will improve FOTO score to >/= 71 points to demonstrate improvements in R UE carrying, moving, and handling objects- progressing (55 (5/14/24) improved from 51 on (4/25/24))  2. Patient will improve impaired shoulder  manual muscle tests 1 grade bilaterally to improve strength for household duties.- progressing (R shoulder 3-/5 to 3+/5 MMT throughout)  3. Patient will improve R shoulder flexion to >/= 165 degrees to improve functional mobility of upper extremities. - progressing (145 degrees)  4. Patient will improve R shoulder external rotation @ 90 degrees abduction  to >/= 70 degrees to improve functional mobility of upper extremities.- met (80 degrees)    PLAN      Pt to continue with current POC, progress per pt's tolerance, and reassess pt at later date to determine need for additional therapy    Outpatient Physical Therapy 3 times weekly for 2 weeks  then 2 times per week for 4 weeks to include the following interventions: Manual Therapy, Neuromuscular Re-ed, Patient Education, Therapeutic Activities, and Therapeutic Exercise.     Noe Madison, PTA

## 2024-05-30 ENCOUNTER — CLINICAL SUPPORT (OUTPATIENT)
Dept: REHABILITATION | Facility: HOSPITAL | Age: 49
End: 2024-05-30
Payer: COMMERCIAL

## 2024-05-30 DIAGNOSIS — Z98.890 S/P RIGHT ROTATOR CUFF REPAIR: Primary | ICD-10-CM

## 2024-05-30 PROCEDURE — 97110 THERAPEUTIC EXERCISES: CPT | Mod: CQ

## 2024-06-04 ENCOUNTER — DOCUMENTATION ONLY (OUTPATIENT)
Dept: REHABILITATION | Facility: HOSPITAL | Age: 49
End: 2024-06-04

## 2024-06-04 ENCOUNTER — CLINICAL SUPPORT (OUTPATIENT)
Dept: REHABILITATION | Facility: HOSPITAL | Age: 49
End: 2024-06-04
Payer: COMMERCIAL

## 2024-06-04 DIAGNOSIS — M25.611 DECREASED ROM OF RIGHT SHOULDER: ICD-10-CM

## 2024-06-04 DIAGNOSIS — S43.431A SUPERIOR GLENOID LABRUM LESION OF RIGHT SHOULDER, INITIAL ENCOUNTER: ICD-10-CM

## 2024-06-04 DIAGNOSIS — S46.011A TRAUMATIC COMPLETE TEAR OF RIGHT ROTATOR CUFF, INITIAL ENCOUNTER: Primary | ICD-10-CM

## 2024-06-04 DIAGNOSIS — M75.41 IMPINGEMENT SYNDROME OF RIGHT SHOULDER: ICD-10-CM

## 2024-06-04 DIAGNOSIS — R29.898 DECREASED STRENGTH OF UPPER EXTREMITY: ICD-10-CM

## 2024-06-04 DIAGNOSIS — M25.511 ACUTE PAIN OF RIGHT SHOULDER: ICD-10-CM

## 2024-06-04 PROCEDURE — 97110 THERAPEUTIC EXERCISES: CPT

## 2024-06-04 NOTE — PLAN OF CARE
[facility]  PT Plan of Care Note     Name: Amadeo Jha  [facility] Number: [id number]    Therapy Diagnosis:   Encounter Diagnoses   Name Primary?    Decreased ROM of right shoulder     Decreased strength of upper extremity     Impingement syndrome of right shoulder     Acute pain of right shoulder     Superior glenoid labrum lesion of right shoulder, initial encounter     Traumatic complete tear of right rotator cuff, initial encounter Yes     Physician: Sloane Escobedo PA*    Visit Date: 6/4/2024  Time In: 0900  Time Out: 0940  Total Billable Time: 40 minutes    Physician Orders: PT EVAL and TREAT  Medical Diagnosis from Referral: S43.431A - Superior glenoid labrum lesion of the right shoulder , S46.011A - Traumatic Tear of right rotator cuff unspecified tear extent, S46.111D - Labral tear of long head of right biceps tendon  Evaluation Date: 4/25/2024  Authorization Period Expiration: 6/14/24  Plan of Care Expiration: 6/14/24  Reassessment / Plan of Care completed: RA- 5/23/24, POC- 6/4/24  Next Reassessment / Plan of Care due: 7/2/24  Date of Surgery: 3/27/24  Visit # / Visits authorized: 9/14    Precautions: Standard, s/p shoulder surgery protocol     SUBJECTIVE     Pt reports: he has some pain in his R shoulder but his low back pain is worse and needs therapy on his back. He has been using his R arm more with daily activities  Pt reports compliance with HEP outside of therapy since last session    Pain level: 3/10 without medication for pain  Location of pain: R shoulder    FOTO Score: 67 (5/28/24) improved from 55 (5/14/24) improved from 51 on (4/25/24)    OBJECTIVE     Update:  Range of Motion/Strength:      Shoulder Right Pain/Dysfunction with Movement   AROM       flexion 160 deg     extension WFL     abduction 150 deg     adduction WFL     Internal rotation 90 deg     ER at 90° abd 80 deg                   U/E MMT Right Left Pain/Dysfunction with Movement   Shoulder Flexion  4-/5 5/5     Shoulder Extension 4-/5 5/5     Shoulder Abduction 3+/5 5/5     Shoulder Adduction 4-/5 5/5     Shoulder Internal Rotation 4-/5 5/5     Shoulder External Rotation 4-/5 5/5     Shoulder ER  @ 90* Abduction x 5/5     Elbow Flexion  4-/5 5/5     Elbow Extension 4-/5 5/5                                      Treatment:     therapeutic exercises to develop strength, ROM, and flexibility for 40 minutes including:     Therapeutic Exercise Grid     Exercise 1  Exercise 2  Exercise 3  Exercise 4    Exercise :    Shelf raises: flexion,  abduction  Wall ROM: flexion,  Semi-circles  TB: adduction,  Lawnmower,  Serratus punches TB: Rows,  Extension,  Horizontal abduction, IR,  ER     Repetition/Time :    Flexion-20  Abd- 2 x 10   20 20 20     Resist or Assist :    Flexion- 3lbs  Abd- 2lbs      green TB green TB     Comment :                 Done :    yes yes  yes yes                      Exercise 5  Exercise 6  Exercise 7  Exercise 8    Exercise :    TB: triceps press   Biceps curl   Supine salutes   Supine rhythmic stabilization      Repetition/Time :    20  25  20 3 x 30 sec   Resist or Assist :    green TB   Double green TB           Comment :                 Done :    yes   yes   no   yes                    Exercise 9  Exercise 10  Exercise 11  Exercise 12    Exercise :    Supine AROM with dowel   R ER (in left side-lying) Low rows   Ice pack     Repetition/Time :    20   20 20 x 5 ssec   5'     Resist or Assist :                 Comment :                Done :    no   no no no                    Exercise 13 Exercise 14  Exercise 15  Exercise 16   Exercise :                  Repetition/Time :                  Resist or Assist :                  Comment :                  Done :                                  ASSESSMENT     Update: Pt has completed 9 PT visits since initial eval. Pt s/p R shoulder surgery 10 weeks. Pt progressing well with reduction of R shoulder pain, shoulder ROM in all directions, and UE  strength allowing pt to be less limited with daily activities as evidence by improved functional shoulder FOTO score. Pt's LBP limiting his tolerance with some of his shoulder exercises with exercises and core strengthening exercises issued as well as green TB to progress HEP. Discussed continued healing in shoulder and importance of not lifting heavy objects. Pt would benefit from continued PT to further address pt's deficits    Previous Short Term Goals Status:  met 4/8, progressing towards all  Long Term Goal Status: continue per initial plan of care.  Reasons for Recertification of Therapy: continued R shoulder pain and weakness affecting use of R UE with daily activities    GOALS  Short Term Goals (4 Weeks):      1. Patient will be compliant with home exercise program to assist therapy in restoring pain free motion of the shoulder. - progressing  2. Patient will improve impaired shoulder manual muscle tests 1/2 grade bilaterally to improve strength for functional tasks.- met (R shoulder 3+/5 to 4-/5 MMT throughout)  3. Patient will improve PROM R shoulder flexion >/= 155 degrees to improve functional mobility of upper extremities.- met (AROM 160 degrees)  4. Patient will improve PROM R shoulder abduction >/=150 degrees to improve functional mobility of upper extremities. - met (AROM 150 degrees)     Long Term Goals (8 Weeks):      1. Patient will improve FOTO score to >/= 71 points to demonstrate improvements in R UE carrying, moving, and handling objects- progressing (67 (5/28/24) improved from 55 (5/14/24) improved from 51 on (4/25/24))  2. Patient will improve impaired shoulder manual muscle tests 1 grade bilaterally to improve strength for household duties.- progressing (R shoulder 3+/5 to 4-/5 MMT throughout)  3. Patient will improve R shoulder flexion to >/= 165 degrees to improve functional mobility of upper extremities. - progressing (AROM 160 degrees)  4. Patient will improve R shoulder external  rotation @ 90 degrees abduction  to >/= 70 degrees to improve functional mobility of upper extremities.- met (80 degrees)    PLAN     Updated Certification Period: TBD  Recommended Treatment Plan: 2 times per week for 4 weeks:  Patient Education, Therapeutic Exercise, and pain management      Lanie Saunders PT       I CERTIFY THE NEED FOR THESE SERVICES FURNISHED UNDER THIS PLAN OF TREATMENT AND WHILE UNDER MY CARE   Physician's comments:      Physician's Signature: ___________________________________________________

## 2024-06-06 NOTE — PROGRESS NOTES
[facility]   Physical Therapy Treatment Note     Name: Amadeo Jha  Clinic Number: [id number]    Therapy Diagnosis:        Encounter Diagnoses   Name Primary?    Superior glenoid labrum lesion of right shoulder, initial encounter      Traumatic tear of right rotator cuff, unspecified tear extent, initial encounter      Labral tear of long head of right biceps tendon, subsequent encounter      Decreased ROM of right shoulder Yes    Decreased strength of upper extremity      Acute pain of right shoulder      Impingement syndrome of right shoulder      Physician: Sloane Escobedo PA*    Visit Date: 6/7/2024    Physician Orders: PT EVAL and TREAT  Medical Diagnosis from Referral: S43.431A - Superior glenoid labrum lesion of the right shoulder , S46.011A - Traumatic Tear of right rotator cuff unspecified tear extent, S46.111D - Labral tear of long head of right biceps tendon  Evaluation Date: 4/25/2024  Authorization Period Expiration: 6/14/24  Plan of Care Expiration: 6/14/24  Reassessment / Plan of Care completed: RA- 5/23/24, POC- 6/4/24  Next Reassessment / Plan of Care due: 7/2/24  Date of Surgery: 3/27/24  Visit # / Visits authorized: 10/14     Precautions: Standard, s/p shoulder surgery protocol     PTA Visit #: 1/5     Time In: 0800  Time Out: 0839  Total Billable Time: 39 minutes    SUBJECTIVE     Pt reports: his pain level continues to be lower overall.   He was compliant with home exercise program    FOTO Score: 67 (5/28/24) improved from 55 (5/14/24) improved from 51 on (4/25/24)  Pain: 2/10 no meds  Location: anterior aspect of right shoulder      OBJECTIVE     N/A       Treatment     Amadeo received the treatments listed below:      therapeutic exercises to develop strength, ROM, and flexibility for 39 minutes including:    Therapeutic Exercise Grid     Exercise 1  Exercise 2  Exercise 3  Exercise 4    Exercise :    R shoulder Isometrics in all planes  Table slides  TB:  adduction,  Serratus punches TB: Rows,  extension     Repetition/Time :    10 x 5 sec   20 20 20     Resist or Assist :    5 sec  hold      GreenTB Green TB     Comment :                Done :    yes no  yes yes                     Exercise 5  Exercise 6  Exercise 7  Exercise 8    Exercise :    TB: triceps press   Biceps curl   Supine salutes   Supine serratus punch     Repetition/Time :    20  25   20 20   Resist or Assist :    Green TB   3 lb           Comment :                Done :    yes   yes   yes   yes                     Exercise 9  Exercise 10  Exercise 11  Exercise 12    Exercise :    Supine AAROM with dowel   R ER (in left side-lying) Low rows   Ice pack     Repetition/Time :    20   20 20 x 5 ssec   5'     Resist or Assist :                Comment :              Done :    yes   yes no   no                    Exercise 13 Exercise 14  Exercise 15  Exercise 16   Exercise :    Reverse pendulums              Repetition/Time :                  Resist or Assist :                  Comment :                  Done :    yes                              Exercise 17 Exercise 18 Exercise 19 Exercise 20    Exercise :                  Repetition/Time :                  Resist or Assist :                  Comment :                  Done :                                  Patient Education and Home Exercises     Education provided:   - progress POC per MD orders on last appointment on 5/8/24 including physical therapy to work on gaining strength    - Discussed importance of daily exercises      Written Home Exercises Provided: yes. Exercises were reviewed and Amadeo was able to demonstrate them prior to the end of the session.  Amadeo demonstrated good  understanding of the education provided. See  Patient Instructions for exercises provided during therapy sessions.    ASSESSMENT     Amadeo demonstrated a positive outcome after today's session as evidenced by only mild report of increased pain in the anterior shoulder  during triceps press. He is making a good progression toward goals as evidenced by good tolerance with continuation of increased intensity with strengthening exercises.  He continues to demonstrate deficits with right shoulder strength, stability, ROM, periscapular mobility/strength, and pain with activity. Recommend pt continue with POC and HEP to address deficits above.     Amadeo Is progressing well towards his goals.   Pt prognosis is Excellent.     Pt will continue to benefit from skilled outpatient physical therapy to address the deficits listed in the problem list box on initial evaluation, provide pt/family education and to maximize pt's level of independence in the home and community environment.     Pt's spiritual, cultural and educational needs considered and pt agreeable to plan of care and goals.    Goals: Short Term Goals (4 Weeks):      1. Patient will be compliant with home exercise program to assist therapy in restoring pain free motion of the shoulder. - progressing  2. Patient will improve impaired shoulder manual muscle tests 1/2 grade bilaterally to improve strength for functional tasks.- met (R shoulder 3+/5 to 4-/5 MMT throughout)  3. Patient will improve PROM R shoulder flexion >/= 155 degrees to improve functional mobility of upper extremities.- met (AROM 160 degrees)  4. Patient will improve PROM R shoulder abduction >/=150 degrees to improve functional mobility of upper extremities. - met (AROM 150 degrees)     Long Term Goals (8 Weeks):      1. Patient will improve FOTO score to >/= 71 points to demonstrate improvements in R UE carrying, moving, and handling objects- progressing (67 (5/28/24) improved from 55 (5/14/24) improved from 51 on (4/25/24))  2. Patient will improve impaired shoulder manual muscle tests 1 grade bilaterally to improve strength for household duties.- progressing (R shoulder 3+/5 to 4-/5 MMT throughout)  3. Patient will improve R shoulder flexion to >/= 165 degrees to  improve functional mobility of upper extremities. - progressing (AROM 160 degrees)  4. Patient will improve R shoulder external rotation @ 90 degrees abduction  to >/= 70 degrees to improve functional mobility of upper extremities.- met (80 degrees)  PLAN      Pt to continue with current POC, progress per pt's tolerance, and reassess pt at later date to determine need for additional therapy    Outpatient Physical Therapy 3 times weekly for 2 weeks  then 2 times per week for 4 weeks to include the following interventions: Manual Therapy, Neuromuscular Re-ed, Patient Education, Therapeutic Activities, and Therapeutic Exercise.     Noe Madison, PTA

## 2024-06-07 ENCOUNTER — CLINICAL SUPPORT (OUTPATIENT)
Dept: REHABILITATION | Facility: HOSPITAL | Age: 49
End: 2024-06-07
Payer: COMMERCIAL

## 2024-06-07 DIAGNOSIS — Z98.890 S/P RIGHT ROTATOR CUFF REPAIR: Primary | ICD-10-CM

## 2024-06-07 PROCEDURE — 97110 THERAPEUTIC EXERCISES: CPT | Mod: CQ

## 2024-06-10 NOTE — PROGRESS NOTES
[facility]   Physical Therapy Treatment Note     Name: Amadeo Jha  Clinic Number: [id number]    Therapy Diagnosis:        Encounter Diagnoses   Name Primary?    Superior glenoid labrum lesion of right shoulder, initial encounter      Traumatic tear of right rotator cuff, unspecified tear extent, initial encounter      Labral tear of long head of right biceps tendon, subsequent encounter      Decreased ROM of right shoulder Yes    Decreased strength of upper extremity      Acute pain of right shoulder      Impingement syndrome of right shoulder      Physician: Sloane Escobedo PA*    Visit Date: 6/11/2024    Physician Orders: PT EVAL and TREAT  Medical Diagnosis from Referral: S43.431A - Superior glenoid labrum lesion of the right shoulder , S46.011A - Traumatic Tear of right rotator cuff unspecified tear extent, S46.111D - Labral tear of long head of right biceps tendon  Evaluation Date: 4/25/2024  Authorization Period Expiration: 6/14/24  Plan of Care Expiration: 6/14/24  Reassessment / Plan of Care completed: RA- 5/23/24, POC- 6/4/24  Next Reassessment / Plan of Care due: 7/2/24  Date of Surgery: 3/27/24  Visit # / Visits authorized: 11/14     Precautions: Standard, s/p shoulder surgery protocol     PTA Visit #: 2/5     Time In: 0800  Time Out: 0843  Total Billable Time: 43 minutes    SUBJECTIVE     Pt reports: he feels a lot of stiffness today. Exercises are a little more difficult today for some reason.  He was compliant with home exercise program    FOTO Score: 66 (6/11/24) decreased from  67 (5/28/24)     Pain: 3/10 no meds  Location: anterior aspect of right shoulder      OBJECTIVE     N/A       Treatment     Amadeo received the treatments listed below:      therapeutic exercises to develop strength, ROM, and flexibility for 43 minutes including:    Therapeutic Exercise Grid     Exercise 1  Exercise 2  Exercise 3  Exercise 4    Exercise :    R shoulder Isometrics in all  planes  Table slides  TB: adduction,  Serratus punches TB: Rows,  extension     Repetition/Time :    10 x 5 sec   20 25 25   Resist or Assist :    5 sec  hold      GreenTB Green TB     Comment :                Done :    yes no  yes yes                     Exercise 5  Exercise 6  Exercise 7  Exercise 8    Exercise :    TB: triceps press   Biceps curl   Supine salutes   Supine serratus punch     Repetition/Time :    25  25   20 20   Resist or Assist :    Green TB   Dbl Green TB           Comment :                Done :    yes   yes   no   no                   Exercise 9  Exercise 10  Exercise 11  Exercise 12    Exercise :    Supine AAROM with dowel   R ER (in left side-lying) Low rows   Ice pack     Repetition/Time :    20   20 20 x 5 ssec   5'     Resist or Assist :                Comment :              Done :    no no no   no                    Exercise 13 Exercise 14  Exercise 15  Exercise 16   Exercise :    Reverse pendulums   Shelf raises   Upper extremity bike   Rhythmic stabilization     Repetition/Time :       3 x 10   3 min fwd  3 min rev   3 x 30 sec     Resist or Assist :       Flexion - 3 lb  Scaption - 2 lb           Comment :                  Done :    no yes   yes                        Exercise 17 Exercise 18 Exercise 19 Exercise 20    Exercise :                  Repetition/Time :                  Resist or Assist :                  Comment :                  Done :                                  Patient Education and Home Exercises     Education provided:   - progress POC per MD orders on last appointment on 5/8/24 including physical therapy to work on gaining strength    - Discussed importance of daily exercises      Written Home Exercises Provided: yes. Exercises were reviewed and Amadeo was able to demonstrate them prior to the end of the session.  Amadeo demonstrated good  understanding of the education provided. See  Patient Instructions for exercises provided during therapy  sessions.    ASSESSMENT     Amadeo demonstrated a fair outcome after today's session as evidenced by report of increased pain in the anterior shoulder during shelf raises. He is making a fair progression toward goals as evidenced by good tolerance with continuation of increased intensity with strengthening exercises, but decreased FOTO score.  He continues to demonstrate deficits with right shoulder strength, stability, ROM, periscapular mobility/strength, and pain with activity. Recommend pt continue with POC and HEP to address deficits above.     Amadeo Is progressing well towards his goals.   Pt prognosis is Excellent.     Pt will continue to benefit from skilled outpatient physical therapy to address the deficits listed in the problem list box on initial evaluation, provide pt/family education and to maximize pt's level of independence in the home and community environment.     Pt's spiritual, cultural and educational needs considered and pt agreeable to plan of care and goals.    Goals: Short Term Goals (4 Weeks):      1. Patient will be compliant with home exercise program to assist therapy in restoring pain free motion of the shoulder. - progressing  2. Patient will improve impaired shoulder manual muscle tests 1/2 grade bilaterally to improve strength for functional tasks.- met (R shoulder 3+/5 to 4-/5 MMT throughout)  3. Patient will improve PROM R shoulder flexion >/= 155 degrees to improve functional mobility of upper extremities.- met (AROM 160 degrees)  4. Patient will improve PROM R shoulder abduction >/=150 degrees to improve functional mobility of upper extremities. - met (AROM 150 degrees)     Long Term Goals (8 Weeks):      1. Patient will improve FOTO score to >/= 71 points to demonstrate improvements in R UE carrying, moving, and handling objects- progressing (67 (5/28/24) improved from 55 (5/14/24) improved from 51 on (4/25/24))  2. Patient will improve impaired shoulder manual muscle tests 1  grade bilaterally to improve strength for household duties.- progressing (R shoulder 3+/5 to 4-/5 MMT throughout)  3. Patient will improve R shoulder flexion to >/= 165 degrees to improve functional mobility of upper extremities. - progressing (AROM 160 degrees)  4. Patient will improve R shoulder external rotation @ 90 degrees abduction  to >/= 70 degrees to improve functional mobility of upper extremities.- met (80 degrees)  PLAN      Pt to continue with current POC, progress per pt's tolerance, and reassess pt at later date to determine need for additional therapy    Outpatient Physical Therapy 3 times weekly for 2 weeks  then 2 times per week for 4 weeks to include the following interventions: Manual Therapy, Neuromuscular Re-ed, Patient Education, Therapeutic Activities, and Therapeutic Exercise.     Noe Madison, PTA

## 2024-06-11 ENCOUNTER — CLINICAL SUPPORT (OUTPATIENT)
Dept: REHABILITATION | Facility: HOSPITAL | Age: 49
End: 2024-06-11
Payer: COMMERCIAL

## 2024-06-11 DIAGNOSIS — Z98.890 S/P RIGHT ROTATOR CUFF REPAIR: Primary | ICD-10-CM

## 2024-06-11 PROCEDURE — 97110 THERAPEUTIC EXERCISES: CPT | Mod: CQ

## 2024-06-12 NOTE — PROGRESS NOTES
[facility]   Physical Therapy Treatment Note     Name: Amadeo Jha  Clinic Number: [id number]    Therapy Diagnosis:        Encounter Diagnoses   Name Primary?    Superior glenoid labrum lesion of right shoulder, initial encounter      Traumatic tear of right rotator cuff, unspecified tear extent, initial encounter      Labral tear of long head of right biceps tendon, subsequent encounter      Decreased ROM of right shoulder Yes    Decreased strength of upper extremity      Acute pain of right shoulder      Impingement syndrome of right shoulder      Physician: Sloane Escobedo PA*    Visit Date: 6/13/2024    Physician Orders: PT EVAL and TREAT  Medical Diagnosis from Referral: S43.431A - Superior glenoid labrum lesion of the right shoulder , S46.011A - Traumatic Tear of right rotator cuff unspecified tear extent, S46.111D - Labral tear of long head of right biceps tendon  Evaluation Date: 4/25/2024  Authorization Period Expiration: 6/14/24  Plan of Care Expiration: 6/14/24  Reassessment / Plan of Care completed: RA- 5/23/24, POC- 6/4/24  Next Reassessment / Plan of Care due: 7/2/24  Date of Surgery: 3/27/24  Visit # / Visits authorized: 13/14     Precautions: Standard, s/p shoulder surgery protocol     PTA Visit #: 3/5     Time In: 0800  Time Out: 0829  Total Billable Time: 29 minutes    SUBJECTIVE     Pt reports: continues to feel stiffness in the morning, but feels improvement. Has been careful about movements that he makes while working. Requests to leave a bit early this session.  He was compliant with home exercise program    FOTO Score: 66 (6/11/24) decreased from  67 (5/28/24)     Pain: 1-2/10 no meds  Location: anterior aspect of right shoulder      OBJECTIVE     N/A       Treatment     Amadeo received the treatments listed below:      therapeutic exercises to develop strength, ROM, and flexibility for 43 minutes including:    Therapeutic Exercise Grid     Exercise 1   Exercise 2  Exercise 3  Exercise 4    Exercise :    R shoulder Isometrics in all planes  Table slides  TB: adduction,  Serratus punches TB: Rows,  extension     Repetition/Time :    10 x 5 sec   20 25 25   Resist or Assist :    5 sec  hold      GreenTB Green TB     Comment :                Done :    no no  yes yes                     Exercise 5  Exercise 6  Exercise 7  Exercise 8    Exercise :    TB: triceps press   Biceps curl   Supine salutes   Supine serratus punch     Repetition/Time :    25  25   20 20   Resist or Assist :    Green TB   Dbl Green TB           Comment :                Done :    yes   yes   no   no                   Exercise 9  Exercise 10  Exercise 11  Exercise 12    Exercise :    Supine AAROM with dowel   R ER (in left side-lying) Low rows   Ice pack     Repetition/Time :    20   20 20 x 5 ssec   5'     Resist or Assist :                Comment :              Done :    no no no   no                    Exercise 13 Exercise 14  Exercise 15  Exercise 16   Exercise :    Reverse pendulums   Shelf raises   Upper extremity bike   Rhythmic stabilization     Repetition/Time :       3 x 10   3 min fwd  3 min rev   3 x 30 sec     Resist or Assist :       Flexion - 3 lb  Scaption - 2 lb           Comment :                  Done :    no yes   yes                        Exercise 17 Exercise 18 Exercise 19 Exercise 20    Exercise :                  Repetition/Time :                  Resist or Assist :                  Comment :                  Done :                                  Patient Education and Home Exercises     Education provided:   - progress POC per MD orders on last appointment on 5/8/24 including physical therapy to work on gaining strength    - Discussed importance of daily exercises      Written Home Exercises Provided: yes. Exercises were reviewed and Amadeo was able to demonstrate them prior to the end of the session.  Amadeo demonstrated good  understanding of the education provided. See   Patient Instructions for exercises provided during therapy sessions.    ASSESSMENT     Amadeo demonstrated a positive outcome after today's session as evidenced by minimal report of pain during shelf raises. He is making a good progression toward goals as evidenced by good tolerance with continuation of increased intensity with strengthening exercises, and awareness of safe movement when at work.  He continues to demonstrate deficits with right shoulder strength, stability, ROM, periscapular mobility/strength, and pain with activity. Recommend pt continue with POC and HEP to address deficits above.     Amadeo Is progressing well towards his goals.   Pt prognosis is Excellent.     Pt will continue to benefit from skilled outpatient physical therapy to address the deficits listed in the problem list box on initial evaluation, provide pt/family education and to maximize pt's level of independence in the home and community environment.     Pt's spiritual, cultural and educational needs considered and pt agreeable to plan of care and goals.    Goals: Short Term Goals (4 Weeks):      1. Patient will be compliant with home exercise program to assist therapy in restoring pain free motion of the shoulder. - progressing  2. Patient will improve impaired shoulder manual muscle tests 1/2 grade bilaterally to improve strength for functional tasks.- met (R shoulder 3+/5 to 4-/5 MMT throughout)  3. Patient will improve PROM R shoulder flexion >/= 155 degrees to improve functional mobility of upper extremities.- met (AROM 160 degrees)  4. Patient will improve PROM R shoulder abduction >/=150 degrees to improve functional mobility of upper extremities. - met (AROM 150 degrees)     Long Term Goals (8 Weeks):      1. Patient will improve FOTO score to >/= 71 points to demonstrate improvements in R UE carrying, moving, and handling objects- progressing (67 (5/28/24) improved from 55 (5/14/24) improved from 51 on (4/25/24))  2. Patient  will improve impaired shoulder manual muscle tests 1 grade bilaterally to improve strength for household duties.- progressing (R shoulder 3+/5 to 4-/5 MMT throughout)  3. Patient will improve R shoulder flexion to >/= 165 degrees to improve functional mobility of upper extremities. - progressing (AROM 160 degrees)  4. Patient will improve R shoulder external rotation @ 90 degrees abduction  to >/= 70 degrees to improve functional mobility of upper extremities.- met (80 degrees)  PLAN      Pt to continue with current POC, progress per pt's tolerance, and reassess pt at later date to determine need for additional therapy    Outpatient Physical Therapy 3 times weekly for 2 weeks  then 2 times per week for 4 weeks to include the following interventions: Manual Therapy, Neuromuscular Re-ed, Patient Education, Therapeutic Activities, and Therapeutic Exercise.     Noe Madison, PTA

## 2024-06-13 ENCOUNTER — CLINICAL SUPPORT (OUTPATIENT)
Dept: REHABILITATION | Facility: HOSPITAL | Age: 49
End: 2024-06-13
Payer: COMMERCIAL

## 2024-06-13 DIAGNOSIS — S43.431A SUPERIOR GLENOID LABRUM LESION OF RIGHT SHOULDER: Primary | ICD-10-CM

## 2024-06-13 DIAGNOSIS — Z98.890 S/P RIGHT ROTATOR CUFF REPAIR: Primary | ICD-10-CM

## 2024-06-13 DIAGNOSIS — S46.011A TRAUMATIC TEAR OF RIGHT ROTATOR CUFF, UNSPECIFIED TEAR EXTENT, INITIAL ENCOUNTER: ICD-10-CM

## 2024-06-13 DIAGNOSIS — S46.111D RUPTURE LONG HEAD BICEPS TENDON, RIGHT, SUBSEQUENT ENCOUNTER: ICD-10-CM

## 2024-06-13 PROCEDURE — 97110 THERAPEUTIC EXERCISES: CPT | Mod: CQ

## 2024-06-17 NOTE — PROGRESS NOTES
[facility]   Physical Therapy Treatment Note     Name: Amadeo Jha  Clinic Number: [id number]    Therapy Diagnosis:        Encounter Diagnoses   Name Primary?    Superior glenoid labrum lesion of right shoulder, initial encounter      Traumatic tear of right rotator cuff, unspecified tear extent, initial encounter      Labral tear of long head of right biceps tendon, subsequent encounter      Decreased ROM of right shoulder Yes    Decreased strength of upper extremity      Acute pain of right shoulder      Impingement syndrome of right shoulder      Physician: Sloane Escobedo PA*    Visit Date: 6/18/2024    Physician Orders: PT EVAL and TREAT  Medical Diagnosis from Referral: S43.431A - Superior glenoid labrum lesion of the right shoulder , S46.011A - Traumatic Tear of right rotator cuff unspecified tear extent, S46.111D - Labral tear of long head of right biceps tendon  Evaluation Date: 4/25/2024  Authorization Period Expiration: 6/14/24  Plan of Care Expiration: 6/14/24  Reassessment / Plan of Care completed: RA- 5/23/24, POC- 6/4/24  Next Reassessment / Plan of Care due: 7/2/24  Date of Surgery: 3/27/24  Visit # / Visits authorized: 1/8     Precautions: Standard, s/p shoulder surgery protocol     PTA Visit #: 4/5     Time In: 0800  Time Out: 0844  Total Billable Time: 44 minutes    SUBJECTIVE     Pt reports: has slightly elevated pain in the shoulder today, but his lower back is really what's bothering him.  He was compliant with home exercise program    FOTO Score: 66 (6/11/24) decreased from  67 (5/28/24)     Pain: 3/10 no meds  Location: anterior aspect of right shoulder      OBJECTIVE     N/A       Treatment     Amadeo received the treatments listed below:      therapeutic exercises to develop strength, ROM, and flexibility for 44 minutes including:    Therapeutic Exercise Grid     Exercise 1  Exercise 2  Exercise 3  Exercise 4    Exercise :    R shoulder Isometrics in all  planes  Table slides  TB: adduction,  Serratus punches TB: Rows,  extension     Repetition/Time :    10 x 5 sec   20 20 20   Resist or Assist :    5 sec  hold      BlueTB Blue TB     Comment :                Done :    no no  yes yes                     Exercise 5  Exercise 6  Exercise 7  Exercise 8    Exercise :    TB: triceps press   Biceps curl   Supine salutes   Supine serratus punch     Repetition/Time :    20  20 20 20   Resist or Assist :    Blue TB   Blue TB         Comment :                Done :    yes   yes   yes   no                   Exercise 9  Exercise 10  Exercise 11  Exercise 12    Exercise :    Supine AAROM with dowel   R ER (in left side-lying) Low rows   Ice pack     Repetition/Time :    20   20 20 x 5 ssec   5'     Resist or Assist :                Comment :              Done :    yes no no   no                    Exercise 13 Exercise 14  Exercise 15  Exercise 16   Exercise :    Reverse pendulums   Shelf raises   Upper extremity bike   Rhythmic stabilization     Repetition/Time :       3 x 10   3 min fwd  3 min rev   3 x 30 sec     Resist or Assist :       Flexion - 3 lb  Scaption - 2 lb           Comment :                  Done :    no yes   yes   yes                     Exercise 17 Exercise 18 Exercise 19 Exercise 20    Exercise :    PNF D1, D2              Repetition/Time :    20 ea              Resist or Assist :                  Comment :                  Done :    yes                              Patient Education and Home Exercises     Education provided:   - progress POC per MD orders on last appointment on 5/8/24 including physical therapy to work on gaining strength    - Discussed importance of daily exercises      Written Home Exercises Provided: yes. Exercises were reviewed and Amadeo was able to demonstrate them prior to the end of the session.  Amadeo demonstrated good  understanding of the education provided. See  Patient Instructions for exercises provided during therapy  sessions.    ASSESSMENT     Amadeo demonstrated a positive outcome after today's session as evidenced by ability to perform exercises with minimal increase in pain. He is making a good progression toward goals as evidenced by ability to tolerate progression to blue Thera-band for strengthening exercises.  He continues to demonstrate deficits with right shoulder strength, stability, ROM, periscapular mobility/strength, and pain with activity. Recommend pt continue with POC and HEP to address deficits above.     Amadeo Is progressing well towards his goals.   Pt prognosis is Excellent.     Pt will continue to benefit from skilled outpatient physical therapy to address the deficits listed in the problem list box on initial evaluation, provide pt/family education and to maximize pt's level of independence in the home and community environment.     Pt's spiritual, cultural and educational needs considered and pt agreeable to plan of care and goals.    Goals: Short Term Goals (4 Weeks):      1. Patient will be compliant with home exercise program to assist therapy in restoring pain free motion of the shoulder. - progressing  2. Patient will improve impaired shoulder manual muscle tests 1/2 grade bilaterally to improve strength for functional tasks.- met (R shoulder 3+/5 to 4-/5 MMT throughout)  3. Patient will improve PROM R shoulder flexion >/= 155 degrees to improve functional mobility of upper extremities.- met (AROM 160 degrees)  4. Patient will improve PROM R shoulder abduction >/=150 degrees to improve functional mobility of upper extremities. - met (AROM 150 degrees)     Long Term Goals (8 Weeks):      1. Patient will improve FOTO score to >/= 71 points to demonstrate improvements in R UE carrying, moving, and handling objects- progressing (67 (5/28/24) improved from 55 (5/14/24) improved from 51 on (4/25/24))  2. Patient will improve impaired shoulder manual muscle tests 1 grade bilaterally to improve strength for  household duties.- progressing (R shoulder 3+/5 to 4-/5 MMT throughout)  3. Patient will improve R shoulder flexion to >/= 165 degrees to improve functional mobility of upper extremities. - progressing (AROM 160 degrees)  4. Patient will improve R shoulder external rotation @ 90 degrees abduction  to >/= 70 degrees to improve functional mobility of upper extremities.- met (80 degrees)    PLAN      Pt to continue with current POC, progress per pt's tolerance, and reassess pt at later date to determine need for additional therapy    Outpatient Physical Therapy 3 times weekly for 2 weeks  then 2 times per week for 4 weeks to include the following interventions: Manual Therapy, Neuromuscular Re-ed, Patient Education, Therapeutic Activities, and Therapeutic Exercise.     Noe Madison, PTA

## 2024-06-18 ENCOUNTER — CLINICAL SUPPORT (OUTPATIENT)
Dept: REHABILITATION | Facility: HOSPITAL | Age: 49
End: 2024-06-18
Payer: COMMERCIAL

## 2024-06-18 DIAGNOSIS — Z98.890 S/P RIGHT ROTATOR CUFF REPAIR: Primary | ICD-10-CM

## 2024-06-18 PROCEDURE — 97110 THERAPEUTIC EXERCISES: CPT | Mod: CQ

## 2024-06-20 ENCOUNTER — CLINICAL SUPPORT (OUTPATIENT)
Dept: REHABILITATION | Facility: HOSPITAL | Age: 49
End: 2024-06-20
Payer: COMMERCIAL

## 2024-06-20 DIAGNOSIS — Z98.890 S/P RIGHT ROTATOR CUFF REPAIR: Primary | ICD-10-CM

## 2024-06-20 PROCEDURE — 97110 THERAPEUTIC EXERCISES: CPT | Mod: CQ

## 2024-06-20 NOTE — PROGRESS NOTES
[facility]   Physical Therapy Treatment Note     Name: Amadeo Jha  Clinic Number: [id number]    Therapy Diagnosis:        Encounter Diagnoses   Name Primary?    Superior glenoid labrum lesion of right shoulder, initial encounter      Traumatic tear of right rotator cuff, unspecified tear extent, initial encounter      Labral tear of long head of right biceps tendon, subsequent encounter      Decreased ROM of right shoulder Yes    Decreased strength of upper extremity      Acute pain of right shoulder      Impingement syndrome of right shoulder      Physician: Sloane Escobedo PA*    Visit Date: 6/20/2024    Physician Orders: PT EVAL and TREAT  Medical Diagnosis from Referral: S43.431A - Superior glenoid labrum lesion of the right shoulder , S46.011A - Traumatic Tear of right rotator cuff unspecified tear extent, S46.111D - Labral tear of long head of right biceps tendon  Evaluation Date: 4/25/2024  Authorization Period Expiration: 6/14/24  Plan of Care Expiration: 6/14/24  Reassessment / Plan of Care completed: RA- 5/23/24, POC- 6/4/24  Next Reassessment / Plan of Care due: 7/2/24  Date of Surgery: 3/27/24  Visit # / Visits authorized: 2/8     Precautions: Standard, s/p shoulder surgery protocol     PTA Visit #: 5/5     Time In: 0930  Time Out: 1012  Total Billable Time: 42 minutes    SUBJECTIVE     Pt reports: his back and shoulder are both feeling better today..  He was compliant with home exercise program    FOTO Score: 66 (6/11/24) decreased from  67 (5/28/24)     Pain: 3/10 no meds  Location: anterior aspect of right shoulder      OBJECTIVE     N/A       Treatment     Amadeo received the treatments listed below:      therapeutic exercises to develop strength, ROM, and flexibility for 42 minutes including:    Therapeutic Exercise Grid     Exercise 1  Exercise 2  Exercise 3  Exercise 4    Exercise :    R shoulder Isometrics in all planes  Table slides  TB: adduction,  Serratus  punches TB: Rows,  extension     Repetition/Time :    10 x 5 sec   20 20 20   Resist or Assist :    5 sec  hold      BlueTB Blue TB     Comment :                Done :    no no  yes yes                     Exercise 5  Exercise 6  Exercise 7  Exercise 8    Exercise :    TB: triceps press   Biceps curl   Supine salutes   Supine serratus punch     Repetition/Time :    20  20 20 20   Resist or Assist :    Blue TB   Blue TB         Comment :                Done :    yes   yes   yes   no                   Exercise 9  Exercise 10  Exercise 11  Exercise 12    Exercise :    Supine AAROM with dowel   R ER (in left side-lying) Low rows   Ice pack     Repetition/Time :    20   20 20 x 5 ssec   5'     Resist or Assist :                Comment :              Done :    yes no no   no                    Exercise 13 Exercise 14  Exercise 15  Exercise 16   Exercise :    Reverse pendulums   Shelf raises   Upper extremity bike   Rhythmic stabilization     Repetition/Time :       3 x 10   3 min fwd  3 min rev   3 x 30 sec     Resist or Assist :       Flexion - 3 lb  Scaption - 2 lb           Comment :                  Done :    no yes   yes   yes                     Exercise 17 Exercise 18 Exercise 19 Exercise 20    Exercise :    PNF D1, D2              Repetition/Time :    20 ea              Resist or Assist :                  Comment :                  Done :    yes                              Patient Education and Home Exercises     Education provided:   - progress POC per MD orders on last appointment on 5/8/24 including physical therapy to work on gaining strength    - Discussed importance of daily exercises      Written Home Exercises Provided: yes. Exercises were reviewed and Amadeo was able to demonstrate them prior to the end of the session.  Amadeo demonstrated good  understanding of the education provided. See  Patient Instructions for exercises provided during therapy sessions.    ASSESSMENT     Amadeo demonstrated a positive  outcome after today's session as evidenced by ability to perform exercises with minimal increase in pain. He is making a good progression toward goals as evidenced by lower pain and improved tolerance of shelf raise exercise. He continues to demonstrate deficits with right shoulder strength, stability, ROM, periscapular mobility/strength, and pain with activity. Recommend pt continue with POC and HEP to address deficits above.     Amadeo Is progressing well towards his goals.   Pt prognosis is Excellent.     Pt will continue to benefit from skilled outpatient physical therapy to address the deficits listed in the problem list box on initial evaluation, provide pt/family education and to maximize pt's level of independence in the home and community environment.     Pt's spiritual, cultural and educational needs considered and pt agreeable to plan of care and goals.    Goals: Short Term Goals (4 Weeks):      1. Patient will be compliant with home exercise program to assist therapy in restoring pain free motion of the shoulder. - progressing  2. Patient will improve impaired shoulder manual muscle tests 1/2 grade bilaterally to improve strength for functional tasks.- met (R shoulder 3+/5 to 4-/5 MMT throughout)  3. Patient will improve PROM R shoulder flexion >/= 155 degrees to improve functional mobility of upper extremities.- met (AROM 160 degrees)  4. Patient will improve PROM R shoulder abduction >/=150 degrees to improve functional mobility of upper extremities. - met (AROM 150 degrees)     Long Term Goals (8 Weeks):      1. Patient will improve FOTO score to >/= 71 points to demonstrate improvements in R UE carrying, moving, and handling objects- progressing (67 (5/28/24) improved from 55 (5/14/24) improved from 51 on (4/25/24))  2. Patient will improve impaired shoulder manual muscle tests 1 grade bilaterally to improve strength for household duties.- progressing (R shoulder 3+/5 to 4-/5 MMT throughout)  3.  Patient will improve R shoulder flexion to >/= 165 degrees to improve functional mobility of upper extremities. - progressing (AROM 160 degrees)  4. Patient will improve R shoulder external rotation @ 90 degrees abduction  to >/= 70 degrees to improve functional mobility of upper extremities.- met (80 degrees)    PLAN      Pt to continue with current POC, progress per pt's tolerance, and reassess pt at later date to determine need for additional therapy    Outpatient Physical Therapy 3 times weekly for 2 weeks  then 2 times per week for 4 weeks to include the following interventions: Manual Therapy, Neuromuscular Re-ed, Patient Education, Therapeutic Activities, and Therapeutic Exercise.     Noe Madison, PTA

## 2024-06-24 ENCOUNTER — DOCUMENTATION ONLY (OUTPATIENT)
Dept: REHABILITATION | Facility: HOSPITAL | Age: 49
End: 2024-06-24
Payer: COMMERCIAL

## 2024-06-24 NOTE — PROGRESS NOTES
[facility]   Physical Therapy Treatment Note     Name: Amadeo Jha  [facility] Number: [id number]    Therapy Diagnosis:        Encounter Diagnoses   Name Primary?    Superior glenoid labrum lesion of right shoulder, initial encounter      Traumatic tear of right rotator cuff, unspecified tear extent, initial encounter      Labral tear of long head of right biceps tendon, subsequent encounter      Decreased ROM of right shoulder Yes    Decreased strength of upper extremity      Acute pain of right shoulder      Impingement syndrome of right shoulder      Physician: Sloane Escobedo PA*    Visit Date: 6/25/2024    Physician Orders: PT EVAL and TREAT  Medical Diagnosis from Referral: S43.431A - Superior glenoid labrum lesion of the right shoulder , S46.011A - Traumatic Tear of right rotator cuff unspecified tear extent, S46.111D - Labral tear of long head of right biceps tendon  Evaluation Date: 4/25/2024  Authorization Period Expiration: 6/14/24  Plan of Care Expiration: 6/14/24  Reassessment / Plan of Care completed: RA- 5/23/24, POC- 6/4/24  Next Reassessment / Plan of Care due: 7/2/24  Date of Surgery: 3/27/24  Visit # / Visits authorized: 3/8     Precautions: Standard, s/p shoulder surgery protocol     PTA Visit #: 0/5     Time In: 0800  Time Out: 0840  Total Billable Time: 40 minutes    SUBJECTIVE     Pt reports: he only has trouble with raising his R arm up overhead to the side due to weakness  He was partially compliant with home exercise program    FOTO Score: 66 (6/11/24) decreased from  67 (5/28/24)     Pain: 3/10 no meds  Location: anterior aspect of right shoulder      OBJECTIVE     N/A     Treatment     Amadeo received the treatments listed below:      therapeutic exercises to develop strength, ROM, and flexibility for 40 minutes including:    Therapeutic Exercise Grid     Exercise 1  Exercise 2  Exercise 3  Exercise 4    Exercise :    BOSU: push-ups Mechanical: rows,  Lat  pull down,  Chest press TB: adduction,  Serratus punches TB: Rows,  extension     Repetition/Time :    3 x 10 10 20 20   Resist or Assist :    Inclined end of table 60 lbs,  60 lbs,  50 lbs BlueTB Blue TB     Comment :            Done :    yes  yes yes                     Exercise 5  Exercise 6  Exercise 7  Exercise 8    Exercise :    TB: triceps press   Biceps curl   Supine salutes   Supine serratus punch     Repetition/Time :    20  20 20 20   Resist or Assist :    Blue TB   Blue TB         Comment :                Done :    yes   yes   no   no                   Exercise 9  Exercise 10  Exercise 11  Exercise 12    Exercise :     R ER (in left side-lying)  Ice pack     Repetition/Time :     20  5'     Resist or Assist :            Comment :            Done :     no  no                    Exercise 13 Exercise 14  Exercise 15  Exercise 16   Exercise :     Shelf raises   Upper extremity bike   Rhythmic stabilization     Repetition/Time :     25   3 min fwd  3 min rev   CW- 2 x 20  CCW- 2 x 20     Resist or Assist :     Flexion - 3 lb  Scaption - 2 lb      WB on ball on mat      Comment :                Done :     yes   no   yes                     Exercise 17 Exercise 18 Exercise 19 Exercise 20    Exercise :    PNF D1, D2              Repetition/Time :    20 ea              Resist or Assist :                  Comment :                  Done :    no                              Patient Education and Home Exercises     Education provided:   - progress POC per MD orders on last appointment on 5/8/24 including physical therapy to work on gaining strength    - Discussed importance of daily exercises      Written Home Exercises Provided: yes. Exercises were reviewed and Amadeo was able to demonstrate them prior to the end of the session.  Amadeo demonstrated good  understanding of the education provided. See  Patient Instructions for exercises provided during therapy sessions.    ASSESSMENT     Pt's R shoulder ROM and  strength progressing well however continues with some strength and endurance deficits with overhead flexion and abduction.  Discussed importance of performing HEP daily for optimal improvements due to pt reports minimal compliance. Added shoulder exercises using machines today to further progress POC with resistance and reps limited. Educated pt on importance of shoulder WB exercises     Amadeo Is progressing well towards his goals.   Pt prognosis is Excellent.     Pt will continue to benefit from skilled outpatient physical therapy to address the deficits listed in the problem list box on initial evaluation, provide pt/family education and to maximize pt's level of independence in the home and community environment.     Pt's spiritual, cultural and educational needs considered and pt agreeable to plan of care and goals.    Goals: Short Term Goals (4 Weeks):      1. Patient will be compliant with home exercise program to assist therapy in restoring pain free motion of the shoulder. - progressing  2. Patient will improve impaired shoulder manual muscle tests 1/2 grade bilaterally to improve strength for functional tasks.- met (R shoulder 3+/5 to 4-/5 MMT throughout)  3. Patient will improve PROM R shoulder flexion >/= 155 degrees to improve functional mobility of upper extremities.- met (AROM 160 degrees)  4. Patient will improve PROM R shoulder abduction >/=150 degrees to improve functional mobility of upper extremities. - met (AROM 150 degrees)     Long Term Goals (8 Weeks):      1. Patient will improve FOTO score to >/= 71 points to demonstrate improvements in R UE carrying, moving, and handling objects- progressing (67 (5/28/24) improved from 55 (5/14/24) improved from 51 on (4/25/24))  2. Patient will improve impaired shoulder manual muscle tests 1 grade bilaterally to improve strength for household duties.- progressing (R shoulder 3+/5 to 4-/5 MMT throughout)  3. Patient will improve R shoulder flexion to >/=  165 degrees to improve functional mobility of upper extremities. - progressing (AROM 160 degrees)  4. Patient will improve R shoulder external rotation @ 90 degrees abduction  to >/= 70 degrees to improve functional mobility of upper extremities.- met (80 degrees)    PLAN      Pt to continue with current POC, progress per pt's tolerance, and reassess pt at later date to determine need for additional therapy    Outpatient Physical Therapy 3 times weekly for 2 weeks  then 2 times per week for 4 weeks to include the following interventions: Manual Therapy, Neuromuscular Re-ed, Patient Education, Therapeutic Activities, and Therapeutic Exercise.     Lanie Saunders, PT

## 2024-06-25 ENCOUNTER — CLINICAL SUPPORT (OUTPATIENT)
Dept: REHABILITATION | Facility: HOSPITAL | Age: 49
End: 2024-06-25
Payer: COMMERCIAL

## 2024-06-25 DIAGNOSIS — R29.898 DECREASED STRENGTH OF UPPER EXTREMITY: ICD-10-CM

## 2024-06-25 DIAGNOSIS — S43.431A SUPERIOR GLENOID LABRUM LESION OF RIGHT SHOULDER, INITIAL ENCOUNTER: ICD-10-CM

## 2024-06-25 DIAGNOSIS — M25.511 ACUTE PAIN OF RIGHT SHOULDER: ICD-10-CM

## 2024-06-25 DIAGNOSIS — M75.41 IMPINGEMENT SYNDROME OF RIGHT SHOULDER: ICD-10-CM

## 2024-06-25 DIAGNOSIS — M25.611 DECREASED ROM OF RIGHT SHOULDER: ICD-10-CM

## 2024-06-25 DIAGNOSIS — S46.011A TRAUMATIC COMPLETE TEAR OF RIGHT ROTATOR CUFF, INITIAL ENCOUNTER: Primary | ICD-10-CM

## 2024-06-25 PROCEDURE — 97110 THERAPEUTIC EXERCISES: CPT

## 2024-06-26 NOTE — PROGRESS NOTES
[facility]   Physical Therapy Treatment Note     Name: Amadeo Jha  [facility] Number: [id number]    Therapy Diagnosis:        Encounter Diagnoses   Name Primary?    Superior glenoid labrum lesion of right shoulder, initial encounter      Traumatic tear of right rotator cuff, unspecified tear extent, initial encounter      Labral tear of long head of right biceps tendon, subsequent encounter      Decreased ROM of right shoulder Yes    Decreased strength of upper extremity      Acute pain of right shoulder      Impingement syndrome of right shoulder      Physician: Sloane Escobedo PA*    Visit Date: 6/27/2024    Physician Orders: PT EVAL and TREAT  Medical Diagnosis from Referral: S43.431A - Superior glenoid labrum lesion of the right shoulder , S46.011A - Traumatic Tear of right rotator cuff unspecified tear extent, S46.111D - Labral tear of long head of right biceps tendon  Evaluation Date: 4/25/2024  Authorization Period Expiration: 6/14/24  Plan of Care Expiration: 6/14/24  Reassessment / Plan of Care completed: RA- 5/23/24, POC- 6/4/24  Next Reassessment / Plan of Care due: 7/2/24  Date of Surgery: 3/27/24  Visit # / Visits authorized: 4/8     Precautions: Standard, s/p shoulder surgery protocol     PTA Visit #: 1/5     Time In: 0800  Time Out: 0844  Total Billable Time: 44 minutes    SUBJECTIVE     Pt reports: no new complaints. Reduced pain level today.    He was partially compliant with home exercise program    FOTO Score: 71 (6/27/24) improved from 66 (6/11/24)      Pain: 3/10 no meds  Location: anterior aspect of right shoulder      OBJECTIVE     N/A     Treatment     Amadeo received the treatments listed below:      therapeutic exercises to develop strength, ROM, and flexibility for 44 minutes including:    Therapeutic Exercise Grid     Exercise 1  Exercise 2  Exercise 3  Exercise 4    Exercise :    BOSU: push-ups Mechanical: rows,  Lat pull down,  Chest press TB:  adduction,  Serratus punches TB: Rows,  extension     Repetition/Time :    3 x 10 10 30 30   Resist or Assist :    Inclined end of table 60 lbs,  60 lbs,  50 lbs BlueTB Blue TB     Comment :            Done :    yes yes yes yes                     Exercise 5  Exercise 6  Exercise 7  Exercise 8    Exercise :    TB: triceps press   Biceps curl   Supine salutes   Supine serratus punch     Repetition/Time :    30  30 20 20   Resist or Assist :    Blue TB   Blue TB         Comment :                Done :    yes   yes   no   no                   Exercise 9  Exercise 10  Exercise 11  Exercise 12    Exercise :     R ER (in left side-lying)  Ice pack     Repetition/Time :     20  5'     Resist or Assist :            Comment :            Done :     no  no                    Exercise 13 Exercise 14  Exercise 15  Exercise 16   Exercise :     Shelf raises   Upper extremity bike   Rhythmic stabilization     Repetition/Time :     25   3 min fwd  3 min rev   CW- 2 x 20  CCW- 2 x 20     Resist or Assist :     Flexion - 3 lb  Scaption - 2 lb      WB on ball on mat      Comment :                Done :     yes   yes   yes                     Exercise 17 Exercise 18 Exercise 19 Exercise 20    Exercise :    PNF D1, D2              Repetition/Time :    20 ea              Resist or Assist :                  Comment :                  Done :    yes                              Patient Education and Home Exercises     Education provided:   - progress POC per MD orders on last appointment on 5/8/24 including physical therapy to work on gaining strength    - Discussed importance of daily exercises      Written Home Exercises Provided: yes. Exercises were reviewed and Amadeo was able to demonstrate them prior to the end of the session.  Amadeo demonstrated good  understanding of the education provided. See  Patient Instructions for exercises provided during therapy sessions.    ASSESSMENT     Amadeo demonstrated a positive outcome after today's  session as evidenced by ability to perform exercises with minimal increase in pain. He is making a good progression toward goals as evidenced by lower pain level and ability to increase reps of most exercises with good tolerance. He continues to demonstrate deficits with right shoulder strength, stability, ROM, periscapular mobility/strength, and pain with activity. Recommend pt continue with POC and HEP to address deficits above.     Amadeo Is progressing well towards his goals.   Pt prognosis is Excellent.     Pt will continue to benefit from skilled outpatient physical therapy to address the deficits listed in the problem list box on initial evaluation, provide pt/family education and to maximize pt's level of independence in the home and community environment.     Pt's spiritual, cultural and educational needs considered and pt agreeable to plan of care and goals.    Goals: Short Term Goals (4 Weeks):      1. Patient will be compliant with home exercise program to assist therapy in restoring pain free motion of the shoulder. - progressing  2. Patient will improve impaired shoulder manual muscle tests 1/2 grade bilaterally to improve strength for functional tasks.- met (R shoulder 3+/5 to 4-/5 MMT throughout)  3. Patient will improve PROM R shoulder flexion >/= 155 degrees to improve functional mobility of upper extremities.- met (AROM 160 degrees)  4. Patient will improve PROM R shoulder abduction >/=150 degrees to improve functional mobility of upper extremities. - met (AROM 150 degrees)     Long Term Goals (8 Weeks):      1. Patient will improve FOTO score to >/= 71 points to demonstrate improvements in R UE carrying, moving, and handling objects- progressing (67 (5/28/24) improved from 55 (5/14/24) improved from 51 on (4/25/24))  2. Patient will improve impaired shoulder manual muscle tests 1 grade bilaterally to improve strength for household duties.- progressing (R shoulder 3+/5 to 4-/5 MMT throughout)  3.  Patient will improve R shoulder flexion to >/= 165 degrees to improve functional mobility of upper extremities. - progressing (AROM 160 degrees)  4. Patient will improve R shoulder external rotation @ 90 degrees abduction  to >/= 70 degrees to improve functional mobility of upper extremities.- met (80 degrees)    PLAN      Pt to continue with current POC, progress per pt's tolerance, and reassess pt at later date to determine need for additional therapy    Outpatient Physical Therapy 3 times weekly for 2 weeks  then 2 times per week for 4 weeks to include the following interventions: Manual Therapy, Neuromuscular Re-ed, Patient Education, Therapeutic Activities, and Therapeutic Exercise.     Noe Madison, PTA

## 2024-06-27 ENCOUNTER — CLINICAL SUPPORT (OUTPATIENT)
Dept: REHABILITATION | Facility: HOSPITAL | Age: 49
End: 2024-06-27
Payer: COMMERCIAL

## 2024-06-27 DIAGNOSIS — Z98.890 S/P RIGHT ROTATOR CUFF REPAIR: Primary | ICD-10-CM

## 2024-06-27 PROCEDURE — 97110 THERAPEUTIC EXERCISES: CPT | Mod: CQ

## 2024-07-01 NOTE — PROGRESS NOTES
[facility]   Reassessment / Physical Therapy Treatment Note     Name: Amadeo Jha  Clinic Number: [id number]    Therapy Diagnosis:        Encounter Diagnoses   Name Primary?    Superior glenoid labrum lesion of right shoulder, initial encounter      Traumatic tear of right rotator cuff, unspecified tear extent, initial encounter      Labral tear of long head of right biceps tendon, subsequent encounter      Decreased ROM of right shoulder Yes    Decreased strength of upper extremity      Acute pain of right shoulder      Impingement syndrome of right shoulder      Physician: Sloane Escobedo PA*    Visit Date: 7/2/2024    Physician Orders: PT EVAL and TREAT  Medical Diagnosis from Referral: S43.431A - Superior glenoid labrum lesion of the right shoulder , S46.011A - Traumatic Tear of right rotator cuff unspecified tear extent, S46.111D - Labral tear of long head of right biceps tendon  Evaluation Date: 4/25/2024  Authorization Period Expiration: 7/12/24  Plan of Care Expiration: 7/12/24  Reassessment / Plan of Care completed: RA- 5/23/24, POC- 6/4/24, RA- 7/2/24    Date of Surgery: 3/27/24  Visit # / Visits authorized: 5/8     Precautions: Standard, s/p shoulder surgery protocol     PTA Visit #: 1/5     Time In: 0850  Time Out: 0930  Total Billable Time: 40 minutes    SUBJECTIVE     Pt reports: they rescheduled his appointment with orthopedic to 7/10/24. He is ready for discharge from PT at end of authorization period.   He was partially compliant with home exercise program    Pain: 2/10 no meds  Location: anterior aspect of right shoulder      OBJECTIVE       Range of Motion/Strength:      Shoulder Right Pain/Dysfunction with Movement   AROM       flexion 160 deg     extension WFL     abduction 160 deg     adduction WFL     Internal rotation 90 deg     ER at 90° abd 80 deg                   U/E MMT Right Left Pain/Dysfunction with Movement   Shoulder Flexion 4/5 5/5     Shoulder  Extension 4/5 5/5     Shoulder Abduction 4/5 5/5     Shoulder Adduction 4/5 5/5     Shoulder Internal Rotation 4/5 5/5     Shoulder External Rotation 4/5 5/5     Shoulder ER  @ 90* Abduction x 5/5     Elbow Flexion  4/5 5/5     Elbow Extension 4/5 5/5          Treatment     Amadeo received the treatments listed below:      therapeutic exercises to develop strength, ROM, and flexibility for 40 minutes including:    Therapeutic Exercise Grid     Exercise 1  Exercise 2  Exercise 3  Exercise 4    Exercise :    BOSU: push-ups Mechanical: rows,  Lat pull down,  Chest press TB: adduction,  Serratus punches,  lawnmower TB:   Extension,  IR,   ER     Repetition/Time :    3 x 10 Rows- 2 x 20,  20,  20 30 30   Resist or Assist :    Inclined end of table 60 lbs / 90 lbs  60 lbs,  60 lbs DBL BlueTB with punches and hor abd Blue TB,  DBL blue with IR     Comment :            Done :    yes yes yes yes                     Exercise 5  Exercise 6  Exercise 7  Exercise 8    Exercise :    TB: triceps press   Biceps curl   Supine salutes   TB: hor abd   Repetition/Time :    30  30 20 30   Resist or Assist :    Blue TB   DBL Blue TB    Green TB     Comment :                Done :    yes   yes   no   yes                   Exercise 9  Exercise 10  Exercise 11  Exercise 12    Exercise :    Box raises onto shelf (simulate work task) R ER (in left side-lying)  Ice pack     Repetition/Time :    10 20  5'     Resist or Assist :    20 lbs        Comment :    From 16 inch box onto 5th shelf        Done :    yes no  no                  Exercise 13 Exercise 14  Exercise 15  Exercise 16   Exercise :     Shelf raises   Upper extremity bike   Rhythmic stabilization     Repetition/Time :     25   3 min fwd  3 min rev   CW- 25  CCW- 25     Resist or Assist :     Flexion - 3 lb  Scaption - 2 lb      BOSU on mat      Comment :                Done :     yes   no   yes                     Exercise 17 Exercise 18 Exercise 19 Exercise 20    Exercise :    PNF  D1, D2              Repetition/Time :    20 ea              Resist or Assist :                  Comment :                  Done :    no                              Patient Education and Home Exercises     Education provided:   - progress POC per MD orders on last appointment on 5/8/24 including physical therapy to work on gaining strength    - Discussed importance of daily exercises      Written Home Exercises Provided: yes. Exercises were reviewed and Amadeo was able to demonstrate them prior to the end of the session.  Amadeo demonstrated good  understanding of the education provided. See  Patient Instructions for exercises provided during therapy sessions.    ASSESSMENT     Pt has completed 17 PT visits since initial eval. Pt progressing well with reduction of R shoulder pain, improved ROM, and strength allowing pt to be less limited with daily activities however continues with some strength and endurance deficits with overhead flexion and abduction. Pt has HEP and blue theraband to continue with exercises outside of therapy however discussed importance of performing HEP daily for optimal improvements. Added weighted box shelf raises to simulate work duties to prepare pt to return to work. Pt to be discharged from PT at end of currently approved visits      Amadeo Is progressing well towards his goals.   Pt prognosis is Excellent.     Pt will continue to benefit from skilled outpatient physical therapy to address the deficits listed in the problem list box on initial evaluation, provide pt/family education and to maximize pt's level of independence in the home and community environment.     Pt's spiritual, cultural and educational needs considered and pt agreeable to plan of care and goals.    Goals: Short Term Goals (4 Weeks):      1. Patient will be compliant with home exercise program to assist therapy in restoring pain free motion of the shoulder. - progressing (pt knowledgeable of HEP but minimally performs  outside of therapy)  2. Patient will improve impaired shoulder manual muscle tests 1/2 grade bilaterally to improve strength for functional tasks.- met (R shoulder 4/5 MMT throughout)  3. Patient will improve PROM R shoulder flexion >/= 155 degrees to improve functional mobility of upper extremities.- met (AROM 160 degrees)  4. Patient will improve PROM R shoulder abduction >/=150 degrees to improve functional mobility of upper extremities. - met (AROM 160 degrees)     Long Term Goals (8 Weeks):      1. Patient will improve FOTO score to >/= 71 points to demonstrate improvements in R UE carrying, moving, and handling objects- met (71 (6/27/24), 67 (5/28/24), 55 (5/14/24), 51 on initial eval (4/25/24))  2. Patient will improve impaired shoulder manual muscle tests 1 grade bilaterally to improve strength for household duties.- met (4/5 MMT throughout)  3. Patient will improve R shoulder flexion to >/= 165 degrees to improve functional mobility of upper extremities. - progressing (AROM 160 degrees)  4. Patient will improve R shoulder external rotation @ 90 degrees abduction  to >/= 70 degrees to improve functional mobility of upper extremities.- met (80 degrees)    PLAN      Discharge from PT at end of authorization period with GISSELL Saunders, PT

## 2024-07-02 ENCOUNTER — CLINICAL SUPPORT (OUTPATIENT)
Dept: REHABILITATION | Facility: HOSPITAL | Age: 49
End: 2024-07-02
Payer: COMMERCIAL

## 2024-07-02 DIAGNOSIS — M25.511 ACUTE PAIN OF RIGHT SHOULDER: ICD-10-CM

## 2024-07-02 DIAGNOSIS — R29.898 DECREASED STRENGTH OF UPPER EXTREMITY: ICD-10-CM

## 2024-07-02 DIAGNOSIS — S46.011A TRAUMATIC COMPLETE TEAR OF RIGHT ROTATOR CUFF, INITIAL ENCOUNTER: ICD-10-CM

## 2024-07-02 DIAGNOSIS — S43.431A SUPERIOR GLENOID LABRUM LESION OF RIGHT SHOULDER, INITIAL ENCOUNTER: ICD-10-CM

## 2024-07-02 DIAGNOSIS — M75.41 IMPINGEMENT SYNDROME OF RIGHT SHOULDER: ICD-10-CM

## 2024-07-02 DIAGNOSIS — M25.611 DECREASED ROM OF RIGHT SHOULDER: Primary | ICD-10-CM

## 2024-07-02 PROCEDURE — 97110 THERAPEUTIC EXERCISES: CPT

## 2024-07-11 ENCOUNTER — CLINICAL SUPPORT (OUTPATIENT)
Dept: REHABILITATION | Facility: HOSPITAL | Age: 49
End: 2024-07-11
Payer: COMMERCIAL

## 2024-07-11 DIAGNOSIS — M25.511 ACUTE PAIN OF RIGHT SHOULDER: ICD-10-CM

## 2024-07-11 DIAGNOSIS — R29.898 DECREASED STRENGTH OF UPPER EXTREMITY: ICD-10-CM

## 2024-07-11 DIAGNOSIS — M25.611 DECREASED ROM OF RIGHT SHOULDER: Primary | ICD-10-CM

## 2024-07-11 DIAGNOSIS — S43.431A SUPERIOR GLENOID LABRUM LESION OF RIGHT SHOULDER, INITIAL ENCOUNTER: ICD-10-CM

## 2024-07-11 DIAGNOSIS — M75.41 IMPINGEMENT SYNDROME OF RIGHT SHOULDER: ICD-10-CM

## 2024-07-11 DIAGNOSIS — S46.011A TRAUMATIC COMPLETE TEAR OF RIGHT ROTATOR CUFF, INITIAL ENCOUNTER: ICD-10-CM

## 2024-07-11 PROCEDURE — 97110 THERAPEUTIC EXERCISES: CPT

## 2024-07-11 NOTE — PLAN OF CARE
[facility]  Physical Therapy Discharge Note    Name: Amadeo Jha  [facility] Number: [id number]    Physician:Sloane Escobedo PA*    Physician Orders: PT Eval and Treat  Medical Diagnosis from Referral: S43.431A - Superior glenoid labrum lesion of the right shoulder , S46.011A - Traumatic Tear of right rotator cuff unspecified tear extent, S46.111D - Labral tear of long head of right biceps tendon   Therapy Diagnosis:  Encounter Diagnoses   Name Primary?    Decreased ROM of right shoulder Yes    Decreased strength of upper extremity     Impingement syndrome of right shoulder     Acute pain of right shoulder     Traumatic complete tear of right rotator cuff, initial encounter     Superior glenoid labrum lesion of right shoulder, initial encounter      Evaluation Date:  4/25/2024  Authorization Period Expiration: 7/12/24  Plan of Care Expiration: 7/12/24  Reassessment / Plan of Care completed: RA- 5/23/24, POC- 6/4/24, RA- 7/2/24     Date of Surgery: 3/27/24  Visit # / Visits authorized: 6/8  Total visits received: 18 plus initial eval  Date of Last visit: 7/11/24    Time In: 0850  Time Out: 0920  Duration of treatment:30  minutes    SUBJECTIVE     Pt reports: he only has minimal pain when he sleeps on Right side, he weed-eated yesterday with minimal to no difficulty or pain  Pt reports partially compliant with HEP outside of therapy since last session    Pain level: 1/10 without medication for pain  Location of pain: R shoulder    Functional shoulder FOTO score: 71 (6/27/24), 67 (5/28/24), 55 (5/14/24), 51 on initial eval (4/25/24))     OBJECTIVE     Update:    Range of Motion/Strength:      Shoulder Right Pain/Dysfunction with Movement   AROM       flexion 170 deg     extension WFL     abduction 170 deg     adduction WFL     Internal rotation 90 deg     ER at 90° abd 80 deg                   U/E MMT Right Left Pain/Dysfunction with Movement   Shoulder Flexion 4+/5 5/5     Shoulder  Extension 4+/5 5/5     Shoulder Abduction 4/5 5/5     Shoulder Adduction 4+/5 5/5     Shoulder Internal Rotation 4+/5 5/5     Shoulder External Rotation 4/5 5/5     Shoulder ER  @ 90* Abduction x 5/5     Elbow Flexion  5/5 5/5     Elbow Extension 5/5 5/5       TREATMENT :   therapeutic exercises to develop strength, ROM, and flexibility for 30 minutes including:     Therapeutic Exercise Grid     Exercise 1  Exercise 2  Exercise 3  Exercise 4    Exercise :    BOSU: push-ups Mechanical: rows,  Lat pull down,  Chest press TB: adduction,  Serratus punches,  lawnmower TB:   Extension,  IR,   ER     Repetition/Time :    3 x 10 Rows- 2 x 20,  20,  20 30 30   Resist or Assist :    Inclined end of table 60 lbs / 90 lbs  60 lbs,  60 lbs DBL BlueTB with punches and hor abd Blue TB,  DBL blue with IR     Comment :               Done :    yes yes yes yes                      Exercise 5  Exercise 6  Exercise 7  Exercise 8    Exercise :    TB: triceps press   Biceps curl   Supine salutes   TB: hor abd   Repetition/Time :    30  30 20 30   Resist or Assist :    Blue TB   DBL Blue TB    Green TB     Comment :                 Done :    yes   yes   no   yes                    Exercise 9  Exercise 10  Exercise 11  Exercise 12    Exercise :    Box raises onto shelf (simulate work task) R ER (in left side-lying)   Ice pack     Repetition/Time :    10 20   5'     Resist or Assist :    20 lbs          Comment :    From 16 inch box onto 5th shelf          Done :    yes no   no                    Exercise 13 Exercise 14  Exercise 15  Exercise 16   Exercise :      Shelf raises   Upper extremity bike   Rhythmic stabilization     Repetition/Time :      25   3 min fwd  3 min rev   CW- 25  CCW- 25     Resist or Assist :      Flexion - 3 lb  Scaption - 2 lb      BOSU on mat      Comment :                 Done :      yes   no   yes                      Exercise 17 Exercise 18 Exercise 19 Exercise 20    Exercise :    PNF D1, D2               Repetition/Time :    20 ea              Resist or Assist :                  Comment :                  Done :    no                               ASSESSMENT      Amadeo Jha presented to physical therapy on 7/11/24 for final visit of authorized duration. .  Amadeo has progressed well with therapy in the areas of  Right shoulder pain, strength, ROM, and overall functional use allowing pt to be less limited with daily activities as evidence by improved functional shoulder FOTO score. Amadeo has met 7 /8 goals set at initial evaluation, and will continue to work at home towards personal goal(s) . Amadeo is independent with home exercise program and was given handouts throughout this episode of care to reference for continued wellness and physical fitness .     Contact information was given to patient in case any questions arise in the future or if therapy is needed.    Discharge reason: Patient has met all of his/her goals    Anticipated barriers to therapy: severity of shoulder injury     FOTO Score:   Initial = 51  Discharge = 71    Goals:  Short Term Goals (4 Weeks):      1. Patient will be compliant with home exercise program to assist therapy in restoring pain free motion of the shoulder. - progressing (pt knowledgeable of HEP but minimally performs outside of therapy)  2. Patient will improve impaired shoulder manual muscle tests 1/2 grade bilaterally to improve strength for functional tasks.- met (R shoulder 4/5 to 4+/5 MMT throughout)  3. Patient will improve PROM R shoulder flexion >/= 155 degrees to improve functional mobility of upper extremities.- met (AROM 170 degrees)  4. Patient will improve PROM R shoulder abduction >/=150 degrees to improve functional mobility of upper extremities. - met (AROM 170 degrees)     Long Term Goals (8 Weeks):      1. Patient will improve FOTO score to >/= 71 points to demonstrate improvements in R UE carrying, moving, and handling objects- met (71 (6/27/24), 67 (5/28/24),  55 (5/14/24), 51 on initial eval (4/25/24))  2. Patient will improve impaired shoulder manual muscle tests 1 grade bilaterally to improve strength for household duties.- met (4/5  to 4+/5 MMT throughout)  3. Patient will improve R shoulder flexion to >/= 165 degrees to improve functional mobility of upper extremities. - met (AROM 170 degrees)  4. Patient will improve R shoulder external rotation @ 90 degrees abduction  to >/= 70 degrees to improve functional mobility of upper extremities.- met (80 degrees)     PLAN   This patient is discharged from Physical Therapy.     Lanie Saunders, PT

## 2024-08-15 DIAGNOSIS — M54.50 LOW BACK PAIN: Primary | ICD-10-CM

## 2024-08-19 ENCOUNTER — HOSPITAL ENCOUNTER (OUTPATIENT)
Dept: RADIOLOGY | Facility: HOSPITAL | Age: 49
Discharge: HOME | End: 2024-08-19
Attending: ORTHOPAEDIC SURGERY
Payer: COMMERCIAL

## 2024-08-19 DIAGNOSIS — M54.50 LOW BACK PAIN: ICD-10-CM

## 2024-08-19 PROCEDURE — 72148 MRI LUMBAR SPINE W/O DYE: CPT | Mod: TC

## 2024-09-10 ENCOUNTER — LAB VISIT (OUTPATIENT)
Dept: LAB | Facility: HOSPITAL | Age: 49
End: 2024-09-10
Attending: INTERNAL MEDICINE
Payer: COMMERCIAL

## 2024-09-10 DIAGNOSIS — Z00.00 ROUTINE GENERAL MEDICAL EXAMINATION AT A HEALTH CARE FACILITY: Primary | ICD-10-CM

## 2024-09-10 LAB
ALBUMIN SERPL-MCNC: 4.3 G/DL (ref 3.5–5)
ALBUMIN/GLOB SERPL: 1.5 RATIO (ref 1.1–2)
ALP SERPL-CCNC: 44 UNIT/L (ref 40–150)
ALT SERPL-CCNC: 73 UNIT/L (ref 0–55)
ANION GAP SERPL CALC-SCNC: 5 MEQ/L
AST SERPL-CCNC: 32 UNIT/L (ref 5–34)
BILIRUB SERPL-MCNC: 0.9 MG/DL
BUN SERPL-MCNC: 15.8 MG/DL (ref 8.4–25.7)
CALCIUM SERPL-MCNC: 9.3 MG/DL (ref 8.4–10.2)
CHLORIDE SERPL-SCNC: 104 MMOL/L (ref 98–107)
CHOLEST SERPL-MCNC: 164 MG/DL
CHOLEST/HDLC SERPL: 4 {RATIO} (ref 0–5)
CO2 SERPL-SCNC: 31 MMOL/L (ref 22–29)
CREAT SERPL-MCNC: 1.09 MG/DL (ref 0.73–1.18)
CREAT/UREA NIT SERPL: 14
GFR SERPLBLD CREATININE-BSD FMLA CKD-EPI: >60 ML/MIN/1.73/M2
GLOBULIN SER-MCNC: 2.8 GM/DL (ref 2.4–3.5)
GLUCOSE SERPL-MCNC: 99 MG/DL (ref 74–100)
HDLC SERPL-MCNC: 37 MG/DL (ref 35–60)
LDLC SERPL CALC-MCNC: 110 MG/DL (ref 50–140)
POTASSIUM SERPL-SCNC: 3.8 MMOL/L (ref 3.5–5.1)
PROT SERPL-MCNC: 7.1 GM/DL (ref 6.4–8.3)
SODIUM SERPL-SCNC: 140 MMOL/L (ref 136–145)
TRIGL SERPL-MCNC: 83 MG/DL (ref 34–140)
VLDLC SERPL CALC-MCNC: 17 MG/DL

## 2024-09-10 PROCEDURE — 80061 LIPID PANEL: CPT

## 2024-09-10 PROCEDURE — 80053 COMPREHEN METABOLIC PANEL: CPT

## 2024-09-10 PROCEDURE — 36415 COLL VENOUS BLD VENIPUNCTURE: CPT

## 2024-09-20 ENCOUNTER — LAB VISIT (OUTPATIENT)
Dept: LAB | Facility: HOSPITAL | Age: 49
End: 2024-09-20
Attending: INTERNAL MEDICINE
Payer: COMMERCIAL

## 2024-09-20 DIAGNOSIS — M10.9 GOUT, UNSPECIFIED CAUSE, UNSPECIFIED CHRONICITY, UNSPECIFIED SITE: ICD-10-CM

## 2024-09-20 DIAGNOSIS — I11.9 MALIGNANT HYPERTENSIVE HEART DISEASE WITHOUT HEART FAILURE: Primary | ICD-10-CM

## 2024-09-20 DIAGNOSIS — K21.9 GASTROESOPHAGEAL REFLUX DISEASE, UNSPECIFIED WHETHER ESOPHAGITIS PRESENT: ICD-10-CM

## 2024-09-20 DIAGNOSIS — E78.2 MIXED HYPERLIPIDEMIA: ICD-10-CM

## 2024-09-20 LAB
25(OH)D3+25(OH)D2 SERPL-MCNC: 49 NG/ML (ref 30–80)
ALBUMIN SERPL-MCNC: 4.1 G/DL (ref 3.5–5)
ALBUMIN/GLOB SERPL: 1.3 RATIO (ref 1.1–2)
ALP SERPL-CCNC: 40 UNIT/L (ref 40–150)
ALT SERPL-CCNC: 63 UNIT/L (ref 0–55)
ANION GAP SERPL CALC-SCNC: 4 MEQ/L
AST SERPL-CCNC: 32 UNIT/L (ref 5–34)
BASOPHILS # BLD AUTO: 0.02 X10(3)/MCL
BASOPHILS NFR BLD AUTO: 0.4 %
BILIRUB SERPL-MCNC: 1.1 MG/DL
BUN SERPL-MCNC: 20.6 MG/DL (ref 8.4–25.7)
CALCIUM SERPL-MCNC: 9.5 MG/DL (ref 8.4–10.2)
CHLORIDE SERPL-SCNC: 104 MMOL/L (ref 98–107)
CHOLEST SERPL-MCNC: 164 MG/DL
CHOLEST/HDLC SERPL: 5 {RATIO} (ref 0–5)
CO2 SERPL-SCNC: 32 MMOL/L (ref 22–29)
CREAT SERPL-MCNC: 1.05 MG/DL (ref 0.73–1.18)
CREAT UR-MCNC: 388.1 MG/DL (ref 63–166)
CREAT/UREA NIT SERPL: 20
EOSINOPHIL # BLD AUTO: 0.04 X10(3)/MCL (ref 0–0.9)
EOSINOPHIL NFR BLD AUTO: 0.7 %
ERYTHROCYTE [DISTWIDTH] IN BLOOD BY AUTOMATED COUNT: 12.7 % (ref 11.5–17)
EST. AVERAGE GLUCOSE BLD GHB EST-MCNC: 116.9 MG/DL
GFR SERPLBLD CREATININE-BSD FMLA CKD-EPI: >60 ML/MIN/1.73/M2
GLOBULIN SER-MCNC: 3.2 GM/DL (ref 2.4–3.5)
GLUCOSE SERPL-MCNC: 105 MG/DL (ref 74–100)
HBA1C MFR BLD: 5.7 %
HCT VFR BLD AUTO: 51.9 % (ref 42–52)
HDLC SERPL-MCNC: 35 MG/DL (ref 35–60)
HGB BLD-MCNC: 17.7 G/DL (ref 14–18)
IMM GRANULOCYTES # BLD AUTO: 0 X10(3)/MCL (ref 0–0.04)
IMM GRANULOCYTES NFR BLD AUTO: 0 %
LDLC SERPL CALC-MCNC: 113 MG/DL (ref 50–140)
LYMPHOCYTES # BLD AUTO: 1.59 X10(3)/MCL (ref 0.6–4.6)
LYMPHOCYTES NFR BLD AUTO: 28.9 %
MCH RBC QN AUTO: 30.3 PG (ref 27–31)
MCHC RBC AUTO-ENTMCNC: 34.1 G/DL (ref 33–36)
MCV RBC AUTO: 88.7 FL (ref 80–94)
MICROALBUMIN UR-MCNC: 17.5 UG/ML
MICROALBUMIN/CREAT RATIO PNL UR: 4.5 MG/GM CR (ref 0–30)
MONOCYTES # BLD AUTO: 0.46 X10(3)/MCL (ref 0.1–1.3)
MONOCYTES NFR BLD AUTO: 8.4 %
NEUTROPHILS # BLD AUTO: 3.39 X10(3)/MCL (ref 2.1–9.2)
NEUTROPHILS NFR BLD AUTO: 61.6 %
PLATELET # BLD AUTO: 210 X10(3)/MCL (ref 130–400)
PMV BLD AUTO: 9.3 FL (ref 7.4–10.4)
POTASSIUM SERPL-SCNC: 3.6 MMOL/L (ref 3.5–5.1)
PROT SERPL-MCNC: 7.3 GM/DL (ref 6.4–8.3)
RBC # BLD AUTO: 5.85 X10(6)/MCL (ref 4.7–6.1)
SODIUM SERPL-SCNC: 140 MMOL/L (ref 136–145)
TRIGL SERPL-MCNC: 79 MG/DL (ref 34–140)
URATE SERPL-MCNC: 3.6 MG/DL (ref 3.5–7.2)
VLDLC SERPL CALC-MCNC: 16 MG/DL
WBC # BLD AUTO: 5.5 X10(3)/MCL (ref 4.5–11.5)

## 2024-09-20 PROCEDURE — 82043 UR ALBUMIN QUANTITATIVE: CPT

## 2024-09-20 PROCEDURE — 82570 ASSAY OF URINE CREATININE: CPT

## 2024-09-20 PROCEDURE — 36415 COLL VENOUS BLD VENIPUNCTURE: CPT

## 2024-09-20 PROCEDURE — 84550 ASSAY OF BLOOD/URIC ACID: CPT

## 2024-09-20 PROCEDURE — 83036 HEMOGLOBIN GLYCOSYLATED A1C: CPT

## 2024-09-20 PROCEDURE — 80061 LIPID PANEL: CPT

## 2024-09-20 PROCEDURE — 80053 COMPREHEN METABOLIC PANEL: CPT

## 2024-09-20 PROCEDURE — 82306 VITAMIN D 25 HYDROXY: CPT

## 2024-09-20 PROCEDURE — 85025 COMPLETE CBC W/AUTO DIFF WBC: CPT

## 2024-10-07 DIAGNOSIS — M51.26 OTHER INTERVERTEBRAL DISC DISPLACEMENT, LUMBAR REGION: Primary | ICD-10-CM

## 2024-10-10 ENCOUNTER — TELEPHONE (OUTPATIENT)
Dept: NEUROSURGERY | Facility: CLINIC | Age: 49
End: 2024-10-10
Payer: COMMERCIAL

## 2024-10-10 NOTE — TELEPHONE ENCOUNTER
Patient referred to Dr. Armstrong by Dr. Janette Wood for other intervertebral disc displacement of lumbar region.     Patient had MRI of lumbar spine on 8/19/24 that showed:  T12-L1: Unremarkable.  L1-L2: Unremarkable.  L2-L3: Unremarkable.  L3-L4: Mild disc degeneration with shallow disc bulge and mild bilateral facet joint degeneration causing mild central canal stenosis and mild right foraminal stenosis.  The left foramen is patent.  L4-L5: Mild disc degeneration, shallow central disc protrusion, and mild facet joint degeneration with no significant stenosis.  L5-S1: Shallow adjacent endplate Schmorl nodes with adjacent fatty marrow changes.  Shallow central disc osteophyte indenting the ventral thecal sac.  No significant stenosis.    Patient states pain has been present for a few years but progressively worsened over time.  Pain was not caused by any specific incident.  Patient describes pain as lower back with intermittent burning and shooting sensation that radiates to bilateral lower extremities, worst in the left.  Patient states pain is manageable but becomes severe upon activity and overexertion.  Patient states if he does strenuous activity it is hard for him to get out of bed and move around the next day.  States pain wakes him up at night when it is severe.  Patient states he saw Dr. Musa Jeronimo at [facility] a few months ago but could not proceed with treatment due to insurance.  Patients states he takes Meloxicam 15mg once a day and Hydrocodone 10-325mg as needed when pain is severe, medication provides some relief.  Patient states he had a neck surgery with Dr. Armstrong in 2003, denies any additional surgeries or imaging.      Advised patient that Dr. Armstrong has retired, patient agreed to proceed with Dr. Gomez.  Patient scheduled with Dr. Gomez 11/21.  Will request records from [facility].

## 2024-10-21 ENCOUNTER — LAB VISIT (OUTPATIENT)
Dept: LAB | Facility: HOSPITAL | Age: 49
End: 2024-10-21
Attending: INTERNAL MEDICINE
Payer: COMMERCIAL

## 2024-10-21 DIAGNOSIS — R33.9 RETENTION OF URINE, UNSPECIFIED: Primary | ICD-10-CM

## 2024-10-21 LAB
BACTERIA #/AREA URNS AUTO: ABNORMAL /HPF
BILIRUB UR QL STRIP.AUTO: NEGATIVE
CLARITY UR: CLEAR
COLOR UR AUTO: YELLOW
GLUCOSE UR QL STRIP: NEGATIVE
HGB UR QL STRIP: ABNORMAL
KETONES UR QL STRIP: NEGATIVE
LEUKOCYTE ESTERASE UR QL STRIP: NEGATIVE
NITRITE UR QL STRIP: NEGATIVE
PH UR STRIP: 5.5 [PH]
PROT UR QL STRIP: NEGATIVE
RBC #/AREA URNS AUTO: ABNORMAL /HPF
SP GR UR STRIP.AUTO: 1.02 (ref 1–1.03)
SQUAMOUS #/AREA URNS AUTO: ABNORMAL /HPF
UROBILINOGEN UR STRIP-ACNC: 1
WBC #/AREA URNS AUTO: ABNORMAL /HPF

## 2024-10-21 PROCEDURE — 84153 ASSAY OF PSA TOTAL: CPT

## 2024-10-21 PROCEDURE — 81003 URINALYSIS AUTO W/O SCOPE: CPT

## 2024-10-21 PROCEDURE — 36415 COLL VENOUS BLD VENIPUNCTURE: CPT

## 2024-10-21 PROCEDURE — 81001 URINALYSIS AUTO W/SCOPE: CPT

## 2024-10-22 LAB
FREE/TOTAL PSA (OLG): 0.2
PSA FREE MFR SERPL: 23.17 %
PSA FREE SERPL-MCNC: 0.19 NG/ML
PSA SERPL-MCNC: 0.82 NG/ML

## 2024-11-20 NOTE — PROGRESS NOTES
[facility]   Neurosurgery      Amadeosandra Jha  MRN: [id number], CSN: [id number]      : 1975   Age: 49 y.o. male  Payor: [employer]UE [employer] / Plan: [employer] [place] / Product Type: Self Funded /       Ref:  Janette Wood MD  [address]  [place] [address]    PCP: Janette Wood MD    Visit Date: 2024     Patient Active Problem List   Diagnosis    Laceration of right index finger without damage to nail    Traumatic tear of right rotator cuff    Impingement syndrome of right shoulder    Superior glenoid labrum lesion of right shoulder    Right shoulder pain    Decreased ROM of right shoulder    Decreased strength of upper extremity       SUBJECTIVE:      CC:   Chief Complaint   Patient presents with    chronic low back pain radiating down L leg       HPI:   Mr. Jha is a 49 y.o. male who has a past medical history significant for hypertension, GERD, gout, ADD, anxiety, and depression.  He is s/p right rotator cuff surgery on 2024 by Dr. Foy and is also s/p a C4-5 ACDF by Dr. Armstrong in .  Mr. Jha presents as a new patient in the neurosurgery clinic for progressively worsening chronic low back pain with intermittent radiation into his left posterior leg for a duration of 5 years.    The patient describes having gradual onset of low back pain that started approximately 5 years ago without any associated trauma.  His pain level has increased with time.  The patient's low back pain is most noticeable when transitioning from a sitting to standing position and vice versa.  There is pain radiating down his left posterior leg to his foot, which is noticed mostly when sitting.  Mr. Jha has intermittent numbness in the posterior aspect of his left leg.  Although there is no focal weakness, he feels that his left leg will give out from under him when he is standing.  There have been no reports of bowel or bladder  incontinence.  The patient has been fully ambulatory.      Mr. Jha has increased pain the evening after doing activities such as lawn care, lifting, or pressure washing.  The next morning, he has severe pain across his lower back.  This pain can last for several weeks.  A partial reduction of pain is often associated with lying down.  He has also tried taking meloxicam, Celebrex, and hydrocodone.    Mr. Jha was previously followed by orthopedic spine surgeon Dr. Musa Jeronimo, but was not able to continue care due to medical insurance issues.  Dr. Jeronimo recommended a TLIF surgery.  The patient participated in physical therapy 6 months ago at the [facility] in [place], [place] for his right shoulder pain s/p right rotator cuff surgery on 03/27/2024.  He has not had any physical therapy specific for his low back pain.  In addition, Mr. Jha has been evaluated by pain management specialist Dr. Longo in the past.  A radiofrequency ablation procedure in the lumbar region in 2022 was met with partial improvement.  Serial lumbar epidural steroid injections have not reduced his pain level very much.    The patient's established cardiologist is Dr. Ruggiero.       Patient Active Problem List    Diagnosis Date Noted    Traumatic tear of right rotator cuff 02/05/2024    Impingement syndrome of right shoulder 02/05/2024    Superior glenoid labrum lesion of right shoulder 02/05/2024    Right shoulder pain 02/05/2024    Decreased ROM of right shoulder 02/05/2024    Decreased strength of upper extremity 02/05/2024    Laceration of right index finger without damage to nail 05/05/2023     Past Medical History:   Diagnosis Date    Acute pain of right wrist     ADD (attention deficit disorder)     Allergic rhinitis     Anxiety disorder, unspecified     Benign hypertensive heart disease without congestive heart failure     Carotid artery stenosis     Complete tear of scapholunate ligament     Depression      Deviated nasal septum     Deviated nasal septum     Dysphagia     Ganglion cyst of finger of right hand     GERD (gastroesophageal reflux disease)     Gout, unspecified     Hyperglycemia     Hypertension     Insomnia     Left ventricular hypertrophy     Leukocytosis     Localized pain of right shoulder joint     Low back pain, unspecified     Lumbar radiculopathy     Mixed hyperlipidemia     Multiple joint pain     Nontraumatic rupture of muscle or tendon of rotator cuff of right shoulder     PINKY (obstructive sleep apnea)     no cpap    Other chronic allergic conjunctivitis     Pain, joint, shoulder     Painful swallowing     Prolapsed lumbar disc     Spinal instabilities, lumbar region     Sprain of right wrist     Traumatic tear of right rotator cuff     Tricuspid valve regurgitation     Vitamin D deficiency      Past Surgical History:   Procedure Laterality Date    ARTHROSCOPIC DEBRIDEMENT OF SHOULDER Right 3/27/2024    Procedure: DEBRIDEMENT, SHOULDER, ARTHROSCOPIC;  Surgeon: Eric Foy MD;  Location: [facility] OR;  Service: Orthopedics;  Laterality: Right;    ARTHROSCOPIC REPAIR OF ROTATOR CUFF OF SHOULDER Right 3/27/2024    Procedure: REPAIR, ROTATOR CUFF, ARTHROSCOPIC;  Surgeon: Eric Foy MD;  Location: [facility] OR;  Service: Orthopedics;  Laterality: Right;    ARTHROSCOPIC TENOTOMY OF BICEPS TENDON Right 3/27/2024    Procedure: TENOTOMY, BICEPS, ARTHROSCOPIC;  Surgeon: Eric Foy MD;  Location: [facility] OR;  Service: Orthopedics;  Laterality: Right;    CERVICAL FUSION      DECOMPRESSION OF SUBACROMIAL SPACE Right 3/27/2024    Procedure: DECOMPRESSION, SUBACROMIAL SPACE;  Surgeon: Eric Foy MD;  Location: [facility] OR;  Service: Orthopedics;  Laterality: Right;    EXCISION, NASAL TURBINATE, SUBMUCOSAL Bilateral 12/19/2023    Procedure: EXCISION, NASAL TURBINATE, SUBMUCOSAL;  Surgeon: Olayinka Harper MD;  Location: [facility] OR;  Service: ENT;  Laterality: Bilateral;    KNEE ARTHROSCOPY W/ ACL  RECONSTRUCTION Right     left elbow      NASAL SEPTOPLASTY N/A 12/19/2023    Procedure: SEPTOPLASTY, NOSE;  Surgeon: Olayinka Harper MD;  Location: [facility] OR;  Service: ENT;  Laterality: N/A;    REPAIR OF LIGAMENT OF WRIST Right 7/25/2022    Procedure: REPAIR, LIGAMENT, WRIST;  Surgeon: Garry Gray MD;  Location: [facility] OR;  Service: Orthopedics;  Laterality: Right;       Current Outpatient Medications:     azelastine (ASTELIN) 137 mcg (0.1 %) nasal spray, SMARTSIG:Both Nares, Disp: , Rfl:     dextroamphetamine-amphetamine (ADDERALL) 20 mg tablet, Take 1 tablet by mouth 2 (two) times daily., Disp: , Rfl:     eszopiclone (LUNESTA) 3 mg Tab, Take 1 tablet every day by oral route at bedtime., Disp: , Rfl:     famotidine (PEPCID) 40 MG tablet, 30 tablets., Disp: , Rfl:     febuxostat (ULORIC) 80 mg Tab, Take 1 tablet by mouth once daily., Disp: , Rfl:     fluticasone propionate (FLONASE) 50 mcg/actuation nasal spray, by Each Nostril route., Disp: , Rfl:     HYDROcodone-acetaminophen (NORCO)  mg per tablet, Take 1 tablet by mouth., Disp: , Rfl:     ibuprofen (ADVIL,MOTRIN) 200 MG tablet, Take 400 mg by mouth every 6 (six) hours as needed., Disp: , Rfl:     lansoprazole (PREVACID) 30 MG capsule, Take 30 mg by mouth once daily., Disp: , Rfl:     levocetirizine (XYZAL) 5 MG tablet, Take 1 tablet every day by oral route in the morning for 30 days., Disp: , Rfl:     LIDOcaine (LIDODERM) 5 %, 1 patch., Disp: , Rfl:     meloxicam (MOBIC) 15 MG tablet, , Disp: , Rfl:     olmesartan-hydrochlorothiazide (BENICAR HCT) 40-25 mg per tablet, Take 1 tablet by mouth once daily., Disp: , Rfl:     pantoprazole (PROTONIX) 40 MG tablet, Take 1 tablet every day by oral route in the morning, for REFLUX., Disp: , Rfl:     pregabalin (LYRICA) 75 MG capsule, Take 75 mg by mouth., Disp: , Rfl:     triamcinolone (NASACORT) 55 mcg nasal inhaler, Spray 1 spray every day by intranasal route., Disp: , Rfl:     verapamiL (CALAN-SR) 120 MG CR  tablet, Take 1 tablet every day by oral route at bedtime., Disp: , Rfl:     vitamin D (VITAMIN D3) 1000 units Tab, Take 5,000 Units by mouth once daily., Disp: , Rfl:     celecoxib (CELEBREX) 200 MG capsule, Take 1 capsule (200 mg total) by mouth once daily., Disp: 30 capsule, Rfl: 0    Review of patient's allergies indicates:  No Known Allergies    Social History     Tobacco Use    Smoking status: Some Days     Types: Cigars, Vaping with nicotine    Smokeless tobacco: Never   Substance Use Topics    Alcohol use: Yes     Alcohol/week: 1.0 standard drink of alcohol     Types: 1 Shots of liquor per week     Comment: on weekends     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   HENT:  Negative for congestion and sore throat.    Eyes:  Negative for blurred vision and double vision.   Respiratory:  Negative for cough and shortness of breath.    Cardiovascular:  Negative for chest pain and palpitations.   Gastrointestinal:  Negative for constipation, diarrhea, nausea and vomiting.   Musculoskeletal:  Positive for back pain, Negative for neck pain.   Neurological:  Positive for sensory change, Negative for focal weakness and headaches.   Endo/Heme/Allergies:  Does not bruise/bleed easily.   Psychiatric/Behavioral:  Negative for depression and anxiety.      OBJECTIVE:     EXAMINATION:  /89 (BP Location: Left arm, Patient Position: Sitting)   Pulse 98   Resp 16   Ht 6' (1.829 m)   Wt 117.6 kg (259 lb 3.2 oz)   BMI 35.15 kg/m²   Body Habitus: Significantly Obese    Physical Exam:  Constitutional: The patient is well-developed and cooperative, sitting comfortably in a chair.  He is slow to transition from a sitting to standing position.    Mental Status:    Oriented to person, place, and time  Normal speech    Cranial nerves:  CN II: PERRL, 4 to 3 mm, brisk bilaterally  CN III, IV, and VI: extraocular movements normal, no ptosis  CN V: normal facial sensation and masseter function  CN VII: facial strength normal and symmetrical  CN VIII: hearing normal bilaterally  CN IX and X: swallowing and phonation normal  CN XI: shoulder shrug intact bilaterally  CN XII: tongue protrusion midline    Motor:  Muscle bulk: normal in all extremities  Tone: normal in all extremities    Upper extremities:  Deltoid: right 5/5; left 5/5  Biceps: right 5/5; left 5/5  Triceps: right 5/5; left 5/5  Wrist extensors: right 5/5; left 5/5  Wrist flexors: right 5/5; left 5/5  : right 5/5; left 5/5  Interosseous muscles: right 5/5; left 5/5    Lower extremities:  Hip flexors: right 5/5; left 5/5  Knee extensors: right 5/5; left 5/5  Knee flexors: right 5/5; left 5/5  Foot dorsiflexors: right 5/5; left 5/5  Foot plantar flexors: right 5/5; left 5/5  Extensor hallucis longus: right 5/5; left 5/5    Sensation:  Normal to light touch x 4 extremities    Reflexes:  Biceps: right 0/4; left 0/4  Brachioradialis: right 0/4; left 0/4  Triceps: right 0/4; left 0/4  Knee: right 1+/4; left 1+/4  Ankle: right 1+/4; left 1+/4    Paredess sign: right negative; left negative  Babinski: right downgoing; left downgoing  Clonus: right negative; left negative    Coordination:  Finger to nose: right normal; left normal    Musculoskeletal:    Gait: slow, cautious    Straight leg test: right negative; left negative    Cervical: No pain with palpation  Upper back: No pain with palpation  Lower back: No pain with palpation      DIAGNOSTICS REVIEW OF IMAGING, LAB & OTHER STUDIES:  I have personally reviewed and evaluated the following reports as well as radiographic studies:    MRI lumbar spine without gadolinium, 08/19/2024- there is normal alignment with multilevel epidural lipomatosis.  At L5-S1, there is  degenerative disc disease with Modic changes.  There are multilevel, mild degenerative findings with no significant neural compression.       ASSESSMENT:  Mr. Jha is a 49 y.o. male who has a past medical history significant for hypertension, GERD, gout, ADD, anxiety, and depression.  He is s/p right rotator cuff surgery on 03/27/2024 by Dr. Foy and is also s/p a C4-5 ACDF by Dr. Armstrong in 2023.  Mr. Jha presents as a new patient in the neurosurgery clinic for progressively worsening chronic low back pain with intermittent radiation into his left posterior leg for a duration of 5 years.  He has a normal motor and sensory neurological exam.     I reviewed pertinent imaging studies with the patient.  A MRI lumbar spine without gadolinium demonstrates normal alignment with multilevel epidural lipomatosis.  At L5-S1, there is degenerative disc disease with Modic changes.  There are multilevel, mild degenerative findings with no significant neural compression.         PLAN:    Encounter Diagnoses   Name Primary?    Degeneration of intervertebral disc of lumbar region with discogenic back pain Yes    Epidural lipomatosis     Low back pain radiating to left lower extremity      Orders Placed This Encounter   Procedures    X-Ray Lumbar Complete Including Flex And Ext    Ambulatory referral/consult to Physical/Occupational Therapy        1.  I discussed with Mr. Jha that although it is recognized that he has low back pain radiating into his left posterior leg, continued optimization of medical management is recommended.  He has degenerative disc disease at L5-S1.  However, I do not feel that a lumbar fusion surgery will significantly improve his symptoms at this time.  He is very agreeable to this nonsurgical plan of care.    2.  It is noted that any type of elective lumbar surgery will require preoperative clearance from his established cardiologist Dr. Ruggiero.     3.  To further evaluate his spinal  anatomy, I am ordering lumbar spine x-rays with AP lateral and flexion-extension views.  The patient will be contacted with these radiographic results and any updates to the plan of care.    4.  Mr. Jha is being referred to physical therapy at the [facility] facility in [place], [place] specifically for his low back pain.      5.  I offered referring him to [facility] pain [facility] for evaluation, but he would like to hold off on this referral because pain management injections have not provided long-term benefit in the past.    6.  He is encouraged to discontinue vaping and become nicotine free, as nicotine inhibits tissue healing.    7.  The patient's BMI is elevated at 35.15.  He is recommended to lose least 30 lbs.  We discussed weight loss goals with diet and exercise.  Maintaining a healthy weight and strengthening core muscles are important factors for reducing pain along the spine.     8.  Arrangements are being made Mr. Jha to follow up in the neurosurgery clinic with FAIZAN Schmidt or ELIOT Harrington in 3 months.       This note will be sent to the patient's referring provider Dr. Janette Wood and primary care provider Janette Wood MD.           Bhavna Gomez MD  Neurosurgeon

## 2024-11-21 ENCOUNTER — OFFICE VISIT (OUTPATIENT)
Dept: NEUROSURGERY | Facility: CLINIC | Age: 49
End: 2024-11-21
Payer: COMMERCIAL

## 2024-11-21 ENCOUNTER — HOSPITAL ENCOUNTER (OUTPATIENT)
Dept: RADIOLOGY | Facility: HOSPITAL | Age: 49
Discharge: HOME | End: 2024-11-21
Attending: NEUROLOGICAL SURGERY
Payer: COMMERCIAL

## 2024-11-21 VITALS
BODY MASS INDEX: 35.11 KG/M2 | HEIGHT: 72 IN | SYSTOLIC BLOOD PRESSURE: 126 MMHG | HEART RATE: 98 BPM | WEIGHT: 259.19 LBS | DIASTOLIC BLOOD PRESSURE: 89 MMHG | RESPIRATION RATE: 16 BRPM

## 2024-11-21 DIAGNOSIS — M54.50 LOW BACK PAIN RADIATING TO LEFT LOWER EXTREMITY: ICD-10-CM

## 2024-11-21 DIAGNOSIS — M79.605 LOW BACK PAIN RADIATING TO LEFT LOWER EXTREMITY: ICD-10-CM

## 2024-11-21 DIAGNOSIS — D17.79 EPIDURAL LIPOMATOSIS: ICD-10-CM

## 2024-11-21 DIAGNOSIS — M51.360 DEGENERATION OF INTERVERTEBRAL DISC OF LUMBAR REGION WITH DISCOGENIC BACK PAIN: ICD-10-CM

## 2024-11-21 DIAGNOSIS — M51.360 DEGENERATION OF INTERVERTEBRAL DISC OF LUMBAR REGION WITH DISCOGENIC BACK PAIN: Primary | ICD-10-CM

## 2024-11-21 PROCEDURE — 3079F DIAST BP 80-89 MM HG: CPT | Mod: CPTII,,, | Performed by: NEUROLOGICAL SURGERY

## 2024-11-21 PROCEDURE — 3074F SYST BP LT 130 MM HG: CPT | Mod: CPTII,,, | Performed by: NEUROLOGICAL SURGERY

## 2024-11-21 PROCEDURE — 1160F RVW MEDS BY RX/DR IN RCRD: CPT | Mod: CPTII,,, | Performed by: NEUROLOGICAL SURGERY

## 2024-11-21 PROCEDURE — 3044F HG A1C LEVEL LT 7.0%: CPT | Mod: CPTII,,, | Performed by: NEUROLOGICAL SURGERY

## 2024-11-21 PROCEDURE — 1159F MED LIST DOCD IN RCRD: CPT | Mod: CPTII,,, | Performed by: NEUROLOGICAL SURGERY

## 2024-11-21 PROCEDURE — 72114 X-RAY EXAM L-S SPINE BENDING: CPT | Mod: TC

## 2024-11-21 PROCEDURE — 3008F BODY MASS INDEX DOCD: CPT | Mod: CPTII,,, | Performed by: NEUROLOGICAL SURGERY

## 2024-11-21 PROCEDURE — 99204 OFFICE O/P NEW MOD 45 MIN: CPT | Mod: ,,, | Performed by: NEUROLOGICAL SURGERY

## 2024-11-21 RX ORDER — FLUTICASONE PROPIONATE 50 MCG
SPRAY, SUSPENSION (ML) NASAL
COMMUNITY

## 2024-11-21 RX ORDER — MELOXICAM 15 MG/1
TABLET ORAL
COMMUNITY
Start: 2024-08-22

## 2024-11-21 RX ORDER — AZELASTINE 1 MG/ML
SPRAY, METERED NASAL
COMMUNITY

## 2024-11-21 RX ORDER — LIDOCAINE 50 MG/G
1 PATCH TOPICAL
COMMUNITY
Start: 2024-09-24

## 2024-11-21 RX ORDER — METHYLPREDNISOLONE SODIUM SUCCINATE 40 MG/ML
INJECTION, POWDER, FOR SOLUTION INTRAMUSCULAR; INTRAVENOUS
COMMUNITY
Start: 2024-09-26 | End: 2024-11-21

## 2024-11-21 RX ORDER — ESZOPICLONE 3 MG/1
TABLET, FILM COATED ORAL
COMMUNITY

## 2024-11-21 RX ORDER — HYDROCHLOROTHIAZIDE 25 MG/1
TABLET ORAL
COMMUNITY
Start: 2024-11-13

## 2024-11-21 RX ORDER — TELMISARTAN AND HYDROCHLORTHIAZIDE 80; 25 MG/1; MG/1
TABLET ORAL
COMMUNITY
Start: 2024-06-17 | End: 2024-11-21

## 2024-11-21 RX ORDER — FEBUXOSTAT 80 MG/1
1 TABLET, FILM COATED ORAL DAILY
COMMUNITY
Start: 2024-09-24

## 2024-11-21 RX ORDER — HYDROCODONE BITARTRATE AND ACETAMINOPHEN 10; 325 MG/1; MG/1
1 TABLET ORAL
COMMUNITY
Start: 2024-09-24

## 2024-11-21 RX ORDER — PANTOPRAZOLE SODIUM 40 MG/1
TABLET, DELAYED RELEASE ORAL
COMMUNITY

## 2024-11-21 RX ORDER — HYDROCODONE BITARTRATE AND ACETAMINOPHEN 5; 325 MG/1; MG/1
1 TABLET ORAL EVERY 6 HOURS PRN
COMMUNITY
End: 2024-11-21

## 2024-11-21 RX ORDER — TRIAMCINOLONE ACETONIDE 55 UG/1
SPRAY, METERED NASAL
COMMUNITY

## 2024-11-21 RX ORDER — IBUPROFEN 200 MG
400 TABLET ORAL EVERY 6 HOURS PRN
COMMUNITY

## 2024-11-21 RX ORDER — FAMOTIDINE 40 MG/1
30 TABLET, FILM COATED ORAL
COMMUNITY
Start: 2024-01-17

## 2024-11-21 RX ORDER — ORPHENADRINE CITRATE 30 MG/ML
INJECTION INTRAMUSCULAR; INTRAVENOUS
COMMUNITY
Start: 2024-09-24 | End: 2024-11-21

## 2024-11-21 RX ORDER — LEVOCETIRIZINE DIHYDROCHLORIDE 5 MG/1
TABLET, FILM COATED ORAL
COMMUNITY
Start: 2023-11-29

## 2024-11-21 RX ORDER — PREGABALIN 75 MG/1
75 CAPSULE ORAL
COMMUNITY

## 2024-11-21 RX ORDER — KETOROLAC TROMETHAMINE 30 MG/ML
INJECTION, SOLUTION INTRAMUSCULAR; INTRAVENOUS
COMMUNITY
Start: 2024-09-26 | End: 2024-11-21

## 2024-11-21 NOTE — PATIENT INSTRUCTIONS
Mr. Jha is a 49 y.o. male who has a past medical history significant for hypertension, GERD, gout, ADD, anxiety, and depression.  He is s/p right rotator cuff surgery on 03/27/2024 by Dr. Foy and is also s/p a C4-5 ACDF by Dr. Armstrong in 2023.  Mr. Jha presents as a new patient in the neurosurgery clinic for progressively worsening chronic low back pain with intermittent radiation into his left posterior leg for a duration of 5 years.  He has a normal motor and sensory neurological exam.     I reviewed pertinent imaging studies with the patient.  A MRI lumbar spine without gadolinium demonstrates normal alignment with multilevel epidural lipomatosis.  At L5-S1, there is degenerative disc disease with Modic changes.  There are multilevel, mild degenerative findings with no significant neural compression.         PLAN:    Encounter Diagnoses   Name Primary?    Degeneration of intervertebral disc of lumbar region with discogenic back pain Yes    Epidural lipomatosis     Low back pain radiating to left lower extremity      Orders Placed This Encounter   Procedures    X-Ray Lumbar Complete Including Flex And Ext    Ambulatory referral/consult to Physical/Occupational Therapy        1.  I discussed with Mr. Jha that although it is recognized that he has low back pain radiating into his left posterior leg, continued optimization of medical management is recommended.  He has degenerative disc disease at L5-S1.  However, I do not feel that a lumbar fusion surgery will significantly improve his symptoms at this time.  He is very agreeable to this nonsurgical plan of care.    2.  It is noted that any type of elective lumbar surgery will require preoperative clearance from his established cardiologist Dr. Ruggiero.     3.  To further evaluate his spinal anatomy, I am ordering lumbar spine x-rays with AP lateral and flexion-extension views.  The patient will be contacted with these radiographic results and  any updates to the plan of care.    4.  Mr. Jha is being referred to physical therapy at the [facility] facility in [place], [place] specifically for his low back pain.      5.  I offered referring him to [facility] pain [facility] for evaluation, but he would like to hold off on this referral because pain management injections have not provided long-term benefit in the past.    6.  He is encouraged to discontinue vaping and become nicotine free, as nicotine inhibits tissue healing.    7.  The patient's BMI is elevated at 35.15.  He is recommended to lose least 30 lbs.  We discussed weight loss goals with diet and exercise.  Maintaining a healthy weight and strengthening core muscles are important factors for reducing pain along the spine.     8.  Arrangements are being made Mr. Jha to follow up in the neurosurgery clinic with FAIZAN Schmidt or ELIOT Harrington in 3 months.       This note will be sent to the patient's referring provider Dr. Janette Wood and primary care provider Janette Wood MD.

## 2024-12-02 ENCOUNTER — TELEPHONE (OUTPATIENT)
Dept: NEUROSURGERY | Facility: CLINIC | Age: 49
End: 2024-12-02
Payer: COMMERCIAL

## 2024-12-02 NOTE — TELEPHONE ENCOUNTER
I spoke to the patient.  He has not started physical therapy yet.  He missed a call from them.  When he called back, they could not speak at that time.  He was waiting for a call back from them.  We discussed the x-rays as well as encouraged regarding becoming nicotine free and weight reduction.  He voiced understanding.

## 2024-12-02 NOTE — TELEPHONE ENCOUNTER
I spoke with the patient to see if he has heard from [facility] regarding an appointment for PT. He stated he believes they did call him but he missed the call. I asked him if he was planning on giving them a call back and he stated they will give him another call.

## 2024-12-02 NOTE — TELEPHONE ENCOUNTER
I am requesting FAIZAN Schmidt to contact Mr. Jha.  I reviewed the patient's lumbar spine x-rays images that were completed on 11/21/2024.    These lumbar spine x-rays demonstrate trace retrolisthesis of L2 on L3 and trace anterolisthesis of L3 on L4.  There is no motion on flexion-extension views.      With this updated radiographic result, my recommendations are as follows:    1.  Continued optimization of medical management is recommended for his chronic low back pain radiating into his left posterior leg.    2.  I would like to confirm that Mr. Jha has initiated physical therapy at the [facility] facility in [place], [place]    3.  He is encouraged to continue with goals of becoming nicotine free and losing weight.      4.  Mr. Jha is scheduled to follow up in the neurosurgery clinic with FAIZAN Schmidt on Monday, 02/24/2025.

## 2024-12-03 ENCOUNTER — CLINICAL SUPPORT (OUTPATIENT)
Dept: REHABILITATION | Facility: HOSPITAL | Age: 49
End: 2024-12-03
Payer: COMMERCIAL

## 2024-12-03 DIAGNOSIS — M54.42 CHRONIC LEFT-SIDED LOW BACK PAIN WITH LEFT-SIDED SCIATICA: ICD-10-CM

## 2024-12-03 DIAGNOSIS — R20.0 NUMBNESS AND TINGLING OF LEFT LOWER EXTREMITY: ICD-10-CM

## 2024-12-03 DIAGNOSIS — G89.29 CHRONIC LEFT-SIDED LOW BACK PAIN WITH LEFT-SIDED SCIATICA: ICD-10-CM

## 2024-12-03 DIAGNOSIS — R29.898 DECREASED STRENGTH OF TRUNK AND BACK: ICD-10-CM

## 2024-12-03 DIAGNOSIS — R20.2 NUMBNESS AND TINGLING OF LEFT LOWER EXTREMITY: ICD-10-CM

## 2024-12-03 DIAGNOSIS — R29.898 IMPAIRED FLEXIBILITY OF LOWER EXTREMITY: ICD-10-CM

## 2024-12-03 DIAGNOSIS — M54.9 TENDERNESS OF BACK: ICD-10-CM

## 2024-12-03 DIAGNOSIS — M51.360 DEGENERATION OF INTERVERTEBRAL DISC OF LUMBAR REGION WITH DISCOGENIC BACK PAIN: Primary | ICD-10-CM

## 2024-12-03 PROCEDURE — 97162 PT EVAL MOD COMPLEX 30 MIN: CPT

## 2024-12-03 NOTE — PLAN OF CARE
[facility]   Physical Therapy Initial Evaluation      Name: Amadeo Jha  [facility] Number: [id number]    Therapy Diagnosis: No diagnosis found.     Physician: Bhavna Gomez MD    Physician Orders: PT Eval and Treat, 2 wk 8  Medical Diagnosis from Referral: M51.360- Degeneration of intervertebral disc of lumbar region with discogenic back pain  Evaluation Date: 12/3/2024  Authorization Period Expiration: TBD  Plan of Care Expiration: TBD  Reassessment / Plan of Care Due: 12/31/24  Visit # / Visits authorized: 0/      Functional lumbar FOTO score: 53 on initial eval (12/3/24)    Precautions: Standard     Time In: 1340   Time Out: 1435  Total Appointment Time (timed & untimed codes): 55 minutes    Subjective     Date of onset / History of current condition - Amadeo reports: he has had LBP for > 18 yrs, had nerves burned in his back about 2 yrs ago that helped reduce his pain for about 6 months, and had injections in his back about 2-3 yrs ago that provided minimal relief. He has low back pain with radiating symptoms of pins, needles, numbness, and weakness throughout Left posterior leg. Pain is increased with prolonged standing, walking, sit to stands, and bending / lifting. When he first stands up, he must wait a little while before he starts walking due to LBP and L groin pain. He saw neurosurgeon on 11/21/24 and discussed with pt surgery not indicated at this time but ordered PT to help reduce pain and symptoms. Pt takes Meloxicam and Hydrocodone as needed for pain.      Falls: none    Imaging: MRI of lumbar spine (8/19/24):   Normal alignment of the lumbar spine.  No aggressive marrow signal.  No fracture.  No intrinsic abnormality of the distal cord or cauda equina.  Paraspinous soft tissues are unremarkable.  Incidental simple cyst of the left renal cortex.  T12-L1: Unremarkable.  L1-L2: Unremarkable.  L2-L3: Unremarkable.  L3-L4: Mild disc degeneration with shallow disc bulge and  "mild bilateral facet joint degeneration causing mild central canal stenosis and mild right foraminal stenosis.  The left foramen is patent.  L4-L5: Mild disc degeneration, shallow central disc protrusion, and mild facet joint degeneration with no significant stenosis.  L5-S1: Shallow adjacent endplate Schmorl nodes with adjacent fatty marrow changes.  Shallow central disc osteophyte indenting the ventral thecal sac.  No significant stenosis.    Prior Therapy: not for lumbar pain  Social History:  lives with their spouse  Occupation: owns meat market business  Prior Level of Function: modified Independent, recently  s/p R RTC surgery on 3/27/2024    Pain:  Current 3/10, worst 10/10, best 1/10   Location: lumbar spine, L LE  Description: Aching, Burning, Tingling, Deep, Numb, Sharp, Shooting, and comes and goes  Aggravating Factors: lifting, bending, climbing, pushing, pulling, prolonged standing and walking  Easing Factors: laying down    Patients goals: "decreased LBP and L LE symptoms"     Medical History:   Past Medical History:   Diagnosis Date    Acute pain of right wrist     ADD (attention deficit disorder)     Allergic rhinitis     Anxiety disorder, unspecified     Benign hypertensive heart disease without congestive heart failure     Carotid artery stenosis     Complete tear of scapholunate ligament     Depression     Deviated nasal septum     Deviated nasal septum     Dysphagia     Ganglion cyst of finger of right hand     GERD (gastroesophageal reflux disease)     Gout, unspecified     Hyperglycemia     Hypertension     Insomnia     Left ventricular hypertrophy     Leukocytosis     Localized pain of right shoulder joint     Low back pain, unspecified     Lumbar radiculopathy     Mixed hyperlipidemia     Multiple joint pain     Nontraumatic rupture of muscle or tendon of rotator cuff of right shoulder     PINKY (obstructive sleep apnea)     no cpap    Other chronic allergic conjunctivitis     Pain, joint, " shoulder     Painful swallowing     Prolapsed lumbar disc     Spinal instabilities, lumbar region     Sprain of right wrist     Traumatic tear of right rotator cuff     Tricuspid valve regurgitation     Vitamin D deficiency        Surgical History:   Amadeo Jha  has a past surgical history that includes left elbow; Cervical fusion; Knee arthroscopy w/ ACL reconstruction (Right); Repair of ligament of wrist (Right, 7/25/2022); Nasal septoplasty (N/A, 12/19/2023); excision, nasal turbinate, submucosal (Bilateral, 12/19/2023); Arthroscopic debridement of shoulder (Right, 3/27/2024); Arthroscopic repair of rotator cuff of shoulder (Right, 3/27/2024); Decompression of subacromial space (Right, 3/27/2024); and Arthroscopic tenotomy of biceps tendon (Right, 3/27/2024).    Medications:   Amadeo has a current medication list which includes the following prescription(s): azelastine, celecoxib, dextroamphetamine-amphetamine, eszopiclone, famotidine, febuxostat, fluticasone propionate, hydrocodone-acetaminophen, ibuprofen, lansoprazole, levocetirizine, lidocaine, meloxicam, olmesartan-hydrochlorothiazide, pantoprazole, pregabalin, triamcinolone, verapamil, and vitamin d.    Allergies:   Review of patient's allergies indicates:  No Known Allergies     Objective      Lumbar ROM:  B rotation: WNL- slight pain with left rotation  Flexion: WNL- pain at midrange bending forward and returning upright  B side-bending: WNL- pain at midrange    Flexibility: muscle tightness in B hamstrings and piriformis L > R    B Hip ROM: very tight limiting external rotation unable to cross LE    Tenderness: lower lumbar spinous process, Left lumbar paraspinal muscles    Core muscle weakness affecting bending, lifting, carrying objects, prolonged unsupported standing, sitting, and walking     Posture: pt sits in slumped position causing posterior pelvic tilt and flattening of lumbar lordosis    Treatment     Total Treatment time (time-based codes)  separate from Evaluation: 5 minutes     Amadeo received the treatments listed below:      therapeutic exercises to develop strength, endurance, ROM, flexibility, posture, and core stabilization for 5 minutes including:    Therapeutic Exercise   Therapeutic Exercise Grid     Exercise 1  Exercise 2  Exercise 3  Exercise 4    Exercise :    B LE stretches:   Hamstrings with nerve glides B LE stretches:  Piriformis (manual) Bridges with SB Abdominal press with SB   Repetition/Time :                  Resist or Assist :                  Comment :                Done :                                Exercise 5  Exercise 6  Exercise 7  Exercise 8    Exercise :    DKTC with SB   LTR Prone press-ups B LE: SLR,  SL hip abd,  Prone hip ext   Repetition/Time :                  Resist or Assist :             2 lbs     Comment :                  Done :                                  Exercise 9  Exercise 10  Exercise 11  Exercise 12    Exercise :    Sit to stands with extended weight   Dead lifts Lumbar traction NuStep   (UE and LE)   Repetition/Time :                  Resist or Assist :                  Comment :                  Done :                                 Exercise 13 Exercise 14  Exercise 15  Exercise 16   Exercise :                  Repetition/Time :                  Resist or Assist :                  Comment :                  Done :                                  Patient Education and Home Exercises     Education provided:   - importance and benefits of performing HEP daily for optimal improvements even after discharged from PT  - importance of strengthening core muscles and decreased muscle tightness to reduce stress on lumbar spine with daily activities  - proper technique with bending and lifting objects, maintaining good posture with daily activities to reduce stress on lumbar spine  - how lumbar traction works as treatment option in reducing LBP and radiating symptoms into LE  - anatomy of lumbar spine and  how deficits cause pain and limit function    Written Home Exercises Provided: Patient instructed to cont prior HEP. Exercises were reviewed and Amadeo was able to demonstrate them prior to the end of the session.  Amadeo demonstrated good understanding of the education provided. See EMR under Patient Instructions for exercises provided during therapy sessions.    Assessment     Amadeo is a 49 y.o. male referred to outpatient Physical Therapy with a medical diagnosis of chronic lumbar pain with left LE radiculopathy. Patient presents with pain in lumbar spine with radiating symptoms throughout L LE, muscle tightness, core muscle weakness, decreased lumbar and B hip ROM, and tenderness all affecting tolerance with daily activities. Pt would benefit from PT services to address pt's deficits    Patient prognosis is Fair.   Patient will benefit from skilled outpatient Physical Therapy to address the deficits stated above and in the chart below, provide patient /family education, and to maximize patientt's level of independence.     Plan of care discussed with patient: YES  Patient's spiritual, cultural and educational needs considered and patient is agreeable to the plan of care and goals as stated below:     Anticipated Barriers for therapy: chronic LBP with previous interventions    Medical Necessity is demonstrated by the following  History  Co-morbidities and personal factors that may impact the plan of care [] LOW: no personal factors / co-morbidities  [x] MODERATE: 1-2 personal factors / co-morbidities  [] HIGH: 3+ personal factors / co-morbidities    Moderate / High Support Documentation:     Co-morbidities affecting plan of care: see above     Examination  Body Structures and Functions, activity limitations and participation restrictions that may impact the plan of care [] LOW: addressing 1-2 elements  [x] MODERATE: 3+ elements  [] HIGH: 4+ elements (please support below)    Moderate / High Support Documentation:  see above     Clinical Presentation [] LOW: stable  [x] MODERATE: Evolving  [] HIGH: Unstable     Decision Making/ Complexity Score: moderate       Goals:    Short Term Goals: 4 weeks     Pt will demonstrate knowledge and independence with HEP to continue with exercises outside of therapy to facilitate optimal overall improvements    Pt will improve functional score on lumbar FOTO by >10 points which indicates improved ability to perform daily tasks with less difficulty and pain    Reduce c/o pain in lumbar spine and L LE to 2/10 to improve tolerance with daily activities    Long Term Goals: 8 weeks     Pt will improve functional score on lumbar FOTO by >20 points which indicates improved ability to perform daily tasks with less difficulty and pain    Reduce c/o pain in lumbar spine and L LE to 1/10 to improve tolerance with daily activities    Improve flexibility of B LE in order to reduce mechanical stressors on the lumbar spine    Pt will be able to stand / walk for >45 minutes with < 1/10 pain level in order to complete daily activities including grocery shopping, household chores, etc    Pt will demonstrate proper bending and lifting techniques to reduce stress on lumbar spine    Improve core strength as evidence by able to carry / lift heavy objects with <1/10 pain level    Plan     Plan of care Certification: TBD.    Outpatient Physical Therapy 2 times weekly for 8 weeks to include the following interventions: Cervical/Lumbar Traction, Manual Therapy, Patient Education, Therapeutic Exercise, and pain management .     Lanie Saunders, PT       I CERTIFY THE NEED FOR THESE SERVICES FURNISHED UNDER THIS PLAN OF TREATMENT AND WHILE UNDER MY CARE   Physician's comments:      Physician's Signature: ___________________________________________________

## 2024-12-18 DIAGNOSIS — M51.360 DEGENERATION OF INTERVERTEBRAL DISC OF LUMBAR REGION WITH DISCOGENIC BACK PAIN: Primary | ICD-10-CM

## 2024-12-18 DIAGNOSIS — M54.50 LOW BACK PAIN RADIATING TO LEFT LEG: ICD-10-CM

## 2024-12-18 DIAGNOSIS — M79.605 LOW BACK PAIN RADIATING TO LEFT LEG: ICD-10-CM

## 2024-12-30 ENCOUNTER — CLINICAL SUPPORT (OUTPATIENT)
Dept: REHABILITATION | Facility: HOSPITAL | Age: 49
End: 2024-12-30
Payer: COMMERCIAL

## 2024-12-30 DIAGNOSIS — M54.9 TENDERNESS OF BACK: ICD-10-CM

## 2024-12-30 DIAGNOSIS — M54.42 CHRONIC LEFT-SIDED LOW BACK PAIN WITH LEFT-SIDED SCIATICA: ICD-10-CM

## 2024-12-30 DIAGNOSIS — R20.2 NUMBNESS AND TINGLING OF LEFT LOWER EXTREMITY: ICD-10-CM

## 2024-12-30 DIAGNOSIS — R20.0 NUMBNESS AND TINGLING OF LEFT LOWER EXTREMITY: ICD-10-CM

## 2024-12-30 DIAGNOSIS — G89.29 CHRONIC LEFT-SIDED LOW BACK PAIN WITH LEFT-SIDED SCIATICA: ICD-10-CM

## 2024-12-30 DIAGNOSIS — R29.898 DECREASED STRENGTH OF TRUNK AND BACK: ICD-10-CM

## 2024-12-30 DIAGNOSIS — M51.360 DEGENERATION OF INTERVERTEBRAL DISC OF LUMBAR REGION WITH DISCOGENIC BACK PAIN: Primary | ICD-10-CM

## 2024-12-30 DIAGNOSIS — R29.898 IMPAIRED FLEXIBILITY OF LOWER EXTREMITY: ICD-10-CM

## 2024-12-30 PROCEDURE — 97110 THERAPEUTIC EXERCISES: CPT

## 2024-12-30 NOTE — PROGRESS NOTES
[facility]   Physical Therapy Treatment Note      Name: Amadeo Jha  [facility] Number: [id number]    Therapy Diagnosis:   Encounter Diagnoses   Name Primary?    Degeneration of intervertebral disc of lumbar region with discogenic back pain Yes    Chronic left-sided low back pain with left-sided sciatica     Impaired flexibility of lower extremity     Decreased strength of trunk and back     Numbness and tingling of left lower extremity     Tenderness of back      Physician: Bhavna Gomze MD    Visit Date: 12/30/2024    Medical Diagnosis from Referral: M51.360- Degeneration of intervertebral disc of lumbar region with discogenic back pain  Evaluation Date: 12/3/2024  Authorization Period Expiration: 2/14/25  Plan of Care Expiration: 2/14/25  Reassessment / Plan of Care completed: RA- 12/3/24  Next Reassessment / Plan of Care due: 1/27/25  Visit # / Visits authorized: 1/16       Precautions: Standard     PTA Visit #: 0/5     Time In: 0930  Time Out: 1015  Total Billable Time: 45 minutes    Subjective     Pt reports: he tried a few exercises from HEP but broke theraband so he stopped. He does not have pain if he does not do anything  He was not compliant with home exercise program.  Response to previous treatment: n/a  Functional change: n/a    Pain: 2/10 without medication for pain  Location: across lumbar spine     Functional lumbar FOTO score: 47 (12/30/24), 53 on initial eval (12/3/24)    Objective      Lumbar ROM:  B rotation: WNL- slight pain with left rotation  Flexion: WNL- pain at midrange bending forward and returning upright  B side-bending: WNL- pain at midrange     Flexibility: muscle tightness in B hamstrings and piriformis L > R     B Hip ROM: very tight limiting external rotation unable to cross LE     Tenderness: lower lumbar spinous process, Left lumbar paraspinal muscles     Core muscle weakness affecting bending, lifting, carrying objects, prolonged unsupported  standing, sitting, and walking      Posture: pt sits in slumped position causing posterior pelvic tilt and flattening of lumbar lordosis    Treatment     Amadeo received the treatments listed below:      therapeutic exercises to develop strength, endurance, ROM, flexibility, posture, and core stabilization for 5 minutes including:     Therapeutic Exercise   Therapeutic Exercise Grid     Exercise 1  Exercise 2  Exercise 3  Exercise 4    Exercise :    B LE stretches:   Hamstrings with nerve glides B LE stretches:  Piriformis (manual) Bridges with SB Abdominal press with SB   Repetition/Time :    3 x 30 sec      15 x 5 secs   15 x 5 sec     Resist or Assist :                  Comment :                 Done :    yes  no   yes yes                    Exercise 5  Exercise 6  Exercise 7  Exercise 8    Exercise :    DKTC with SB   LTR Prone press-ups B LE: SLR,  SL hip abd,  Prone hip ext   Repetition/Time :    15 x 5 sec   15   15 x 5 sec 15     Resist or Assist :             2 lbs     Comment :                  Done :    yes   yes yes   yes                    Exercise 9  Exercise 10  Exercise 11  Exercise 12    Exercise :    Sit to stands with extended weight   Dead lifts Lumbar traction NuStep   (UE and LE)   Repetition/Time :    15         10 min     Resist or Assist :    5 lbs         L6,  A10,  S11     Comment :    Normal height mat              Done :    yes   no   no yes                    Exercise 13 Exercise 14  Exercise 15  Exercise 16   Exercise :    Bird dog              Repetition/Time :    15              Resist or Assist :    2 lbs              Comment :                  Done :    yes                             Patient Education and Home Exercises       Education provided:   - importance and benefits of performing HEP daily for optimal improvements even after discharged from PT  - importance of strengthening core muscles and decreased muscle tightness to reduce stress on lumbar spine with daily activities  -  proper technique with bending and lifting objects, maintaining good posture with daily activities to reduce stress on lumbar spine  - how lumbar traction works as treatment option in reducing LBP and radiating symptoms into LE  - anatomy of lumbar spine and how deficits cause pain and limit function    Written Home Exercises Provided: Patient instructed to cont prior HEP. Exercises were reviewed and Amadeo was able to demonstrate them prior to the end of the session.  Amadeo demonstrated good understanding of the education provided. See EMR under Patient Instructions for exercises provided during therapy sessions    Assessment     Pt returned to PT for initial visit following initial eval. No changes thus far due to no PT received and pt has not performed HEP issued. Educated pt on importance of attending PT and performing HEP outside of therapy for optimal improvements. Pt not sure if he wants to complete POC due to expensive copay. Pt performed initiation of LE and core strengthening and stretches with good effort with cues and demonstration required for proper technique to isolate specific muscles. Pt to continue with current POC, progress per pt's tolerance, and reassess pt at later date to determine need for additional therapy    Amadeo Is not progressing thus far towards his goals.   Pt prognosis is Good.     Pt will continue to benefit from skilled outpatient physical therapy to address the deficits listed in the problem list box on initial evaluation, provide pt/family education and to maximize pt's level of independence in the home and community environment.     Pt's spiritual, cultural and educational needs considered and pt agreeable to plan of care and goals.     Anticipated barriers to physical therapy: chronic LBP with previous interventions     Goals:   Short Term Goals: 4 weeks      Pt will demonstrate knowledge and independence with HEP to continue with exercises outside of therapy to facilitate  optimal overall improvements- not met     Pt will improve functional score on lumbar FOTO by >10 points which indicates improved ability to perform daily tasks with less difficulty and pain- not met (47 (12/30/24), 53 on initial eval (12/3/24)     Reduce c/o pain in lumbar spine and L LE to 2/10 to improve tolerance with daily activities- progressing (c/o 2/10 pain level when he does not do anything but increased pain when he does things)     Long Term Goals: 8 weeks      Pt will improve functional score on lumbar FOTO by >20 points which indicates improved ability to perform daily tasks with less difficulty and pain     Reduce c/o pain in lumbar spine and L LE to 1/10 to improve tolerance with daily activities     Improve flexibility of B LE in order to reduce mechanical stressors on the lumbar spine     Pt will be able to stand / walk for >45 minutes with < 1/10 pain level in order to complete daily activities including grocery shopping, household chores, etc     Pt will demonstrate proper bending and lifting techniques to reduce stress on lumbar spine     Improve core strength as evidence by able to carry / lift heavy objects with <1/10 pain level    Plan     Continue with current POC, progress per pt's tolerance, and reassess pt at later date to determine need for additional therapy    Lanie Saunders, PT

## 2025-01-03 ENCOUNTER — CLINICAL SUPPORT (OUTPATIENT)
Dept: REHABILITATION | Facility: HOSPITAL | Age: 50
End: 2025-01-03
Payer: COMMERCIAL

## 2025-01-03 DIAGNOSIS — R29.898 IMPAIRED FLEXIBILITY OF LOWER EXTREMITY: ICD-10-CM

## 2025-01-03 DIAGNOSIS — R29.898 DECREASED STRENGTH OF TRUNK AND BACK: ICD-10-CM

## 2025-01-03 DIAGNOSIS — G89.29 CHRONIC LEFT-SIDED LOW BACK PAIN WITH LEFT-SIDED SCIATICA: ICD-10-CM

## 2025-01-03 DIAGNOSIS — M54.9 TENDERNESS OF BACK: ICD-10-CM

## 2025-01-03 DIAGNOSIS — R20.0 NUMBNESS AND TINGLING OF LEFT LOWER EXTREMITY: ICD-10-CM

## 2025-01-03 DIAGNOSIS — R20.2 NUMBNESS AND TINGLING OF LEFT LOWER EXTREMITY: ICD-10-CM

## 2025-01-03 DIAGNOSIS — M54.42 CHRONIC LEFT-SIDED LOW BACK PAIN WITH LEFT-SIDED SCIATICA: ICD-10-CM

## 2025-01-03 DIAGNOSIS — M51.360 DEGENERATION OF INTERVERTEBRAL DISC OF LUMBAR REGION WITH DISCOGENIC BACK PAIN: Primary | ICD-10-CM

## 2025-01-03 PROCEDURE — 97110 THERAPEUTIC EXERCISES: CPT | Mod: CQ

## 2025-01-03 NOTE — PROGRESS NOTES
[facility]   Physical Therapy Treatment Note      Name: Amadeo Jha  Clinic Number: [id number]    Therapy Diagnosis:   Encounter Diagnoses   Name Primary?    Degeneration of intervertebral disc of lumbar region with discogenic back pain Yes    Chronic left-sided low back pain with left-sided sciatica     Impaired flexibility of lower extremity     Decreased strength of trunk and back     Numbness and tingling of left lower extremity     Tenderness of back      Physician: Bhavna Gomez MD    Visit Date: 1/3/2025    Medical Diagnosis from Referral: M51.360- Degeneration of intervertebral disc of lumbar region with discogenic back pain  Evaluation Date: 12/3/2024  Authorization Period Expiration: 2/14/25  Plan of Care Expiration: 2/14/25  Reassessment / Plan of Care completed: RA- 12/3/24  Next Reassessment / Plan of Care due: 1/27/25  Visit # / Visits authorized: 2/16       Precautions: Standard     PTA Visit #: 1/5     Time In: 0845  Time Out: 0930  Total Billable Time: 45 minutes    Subjective     Pt reports: the pain in the lower back is still the same. Agreed to trying mechanical lumbar traction today.     He was not compliant with home exercise program.  Response to previous treatment: same  Functional change: n/a    Pain: 5/10 without medication for pain  Location: across lumbar spine     Functional lumbar FOTO score: 47 (12/30/24), 53 on initial eval (12/3/24)    Objective      Flexibility: muscle tightness in B hamstrings and piriformis L > R     Tenderness: lower lumbar spinous process, Left lumbar paraspinal muscles     Core muscle weakness affecting bending, lifting, carrying objects, prolonged unsupported standing, sitting, and walking      Posture: pt sits in slumped position causing posterior pelvic tilt and flattening of lumbar lordosis    Treatment     Amadeo received the treatments listed below:      therapeutic exercises to develop strength, endurance, ROM,  flexibility, posture, and core stabilization for 10 minutes including:  supervised modalities after being cleared for contradictions: Mechanical Traction:  Amadeo received intermittent mechanical traction to the lumbar spine at a force of 80 pounds for a total of 21 minutes. Hold time of 30 sec and rest time for 10  sec. Patient tolerated treatment fairly without any adverse effects.     Therapeutic Exercise   Therapeutic Exercise Grid     Exercise 1  Exercise 2  Exercise 3  Exercise 4    Exercise :    B LE stretches:   Hamstrings with nerve glides B LE stretches:  Piriformis (manual) Bridges with SB Abdominal press with SB   Repetition/Time :    3 x 30 sec      15 x 5 secs   15 x 5 sec     Resist or Assist :                  Comment :                 Done :                           Exercise 5  Exercise 6  Exercise 7  Exercise 8    Exercise :    DKTC with SB   LTR Prone press-ups B LE: SLR,  SL hip abd,  Prone hip ext   Repetition/Time :    15 x 5 sec   15   15 x 5 sec 15     Resist or Assist :             2 lbs     Comment :                  Done :                           Exercise 9  Exercise 10  Exercise 11  Exercise 12    Exercise :    Sit to stands with extended weight   Dead lifts Mechanical Lumbar traction NuStep   (UE and LE)   Repetition/Time :    15      21 min   10 min     Resist or Assist :    5 lbs      80/40#  30 sec on/  10 sec off   L6,  A10,  S11     Comment :    Normal height mat              Done :      yes yes                    Exercise 13 Exercise 14  Exercise 15  Exercise 16   Exercise :    Bird dog              Repetition/Time :    15              Resist or Assist :    2 lbs              Comment :                  Done :                               Patient Education and Home Exercises       Education provided:   - importance and benefits of performing HEP daily for optimal improvements even after discharged from PT  - importance of strengthening core muscles and decreased muscle tightness  to reduce stress on lumbar spine with daily activities  - proper technique with bending and lifting objects, maintaining good posture with daily activities to reduce stress on lumbar spine  - how lumbar traction works as treatment option in reducing LBP and radiating symptoms into LE  - anatomy of lumbar spine and how deficits cause pain and limit function    Written Home Exercises Provided: Patient instructed to cont prior HEP. Exercises were reviewed and Amadeo was able to demonstrate them prior to the end of the session.  Amadeo demonstrated good understanding of the education provided. See EMR under Patient Instructions for exercises provided during therapy sessions    Assessment     After being cleared for mechanical lumbar traction, Amadeo demonstrated (+) results from treatment today as evidenced by exhibiting fair tolerance following traction. He did not report additional pain or relief afterwards. He is making a fair progress toward goals as evidenced by compliance with HEP and intermittent pain with antalgic gait pattern.. Recommend continue with current POC and progress patient as tolerated.     Amadeo Is not progressing thus far towards his goals.   Pt prognosis is Good.     Pt will continue to benefit from skilled outpatient physical therapy to address the deficits listed in the problem list box on initial evaluation, provide pt/family education and to maximize pt's level of independence in the home and community environment.     Pt's spiritual, cultural and educational needs considered and pt agreeable to plan of care and goals.     Anticipated barriers to physical therapy: chronic LBP with previous interventions     Goals:   Short Term Goals: 4 weeks      Pt will demonstrate knowledge and independence with HEP to continue with exercises outside of therapy to facilitate optimal overall improvements- not met     Pt will improve functional score on lumbar FOTO by >10 points which indicates improved ability  to perform daily tasks with less difficulty and pain- not met (47 (12/30/24), 53 on initial eval (12/3/24)     Reduce c/o pain in lumbar spine and L LE to 2/10 to improve tolerance with daily activities- progressing (c/o 2/10 pain level when he does not do anything but increased pain when he does things)     Long Term Goals: 8 weeks      Pt will improve functional score on lumbar FOTO by >20 points which indicates improved ability to perform daily tasks with less difficulty and pain     Reduce c/o pain in lumbar spine and L LE to 1/10 to improve tolerance with daily activities     Improve flexibility of B LE in order to reduce mechanical stressors on the lumbar spine     Pt will be able to stand / walk for >45 minutes with < 1/10 pain level in order to complete daily activities including grocery shopping, household chores, etc     Pt will demonstrate proper bending and lifting techniques to reduce stress on lumbar spine     Improve core strength as evidence by able to carry / lift heavy objects with <1/10 pain level    Plan     Continue with current POC, progress per pt's tolerance, and reassess pt at later date to determine need for additional therapy    Caro Finney PTA

## 2025-01-06 ENCOUNTER — CLINICAL SUPPORT (OUTPATIENT)
Dept: REHABILITATION | Facility: HOSPITAL | Age: 50
End: 2025-01-06
Payer: COMMERCIAL

## 2025-01-06 DIAGNOSIS — M54.9 TENDERNESS OF BACK: ICD-10-CM

## 2025-01-06 DIAGNOSIS — R20.2 NUMBNESS AND TINGLING OF LEFT LOWER EXTREMITY: ICD-10-CM

## 2025-01-06 DIAGNOSIS — R20.0 NUMBNESS AND TINGLING OF LEFT LOWER EXTREMITY: ICD-10-CM

## 2025-01-06 DIAGNOSIS — R29.898 DECREASED STRENGTH OF TRUNK AND BACK: ICD-10-CM

## 2025-01-06 DIAGNOSIS — R29.898 IMPAIRED FLEXIBILITY OF LOWER EXTREMITY: ICD-10-CM

## 2025-01-06 DIAGNOSIS — G89.29 CHRONIC LEFT-SIDED LOW BACK PAIN WITH LEFT-SIDED SCIATICA: ICD-10-CM

## 2025-01-06 DIAGNOSIS — M54.42 CHRONIC LEFT-SIDED LOW BACK PAIN WITH LEFT-SIDED SCIATICA: ICD-10-CM

## 2025-01-06 DIAGNOSIS — M51.360 DEGENERATION OF INTERVERTEBRAL DISC OF LUMBAR REGION WITH DISCOGENIC BACK PAIN: Primary | ICD-10-CM

## 2025-01-06 PROCEDURE — 97110 THERAPEUTIC EXERCISES: CPT | Mod: CQ

## 2025-01-06 NOTE — PROGRESS NOTES
[facility]   Physical Therapy Treatment Note      Name: Amadeo Jha  Clinic Number: [id number]    Therapy Diagnosis:   Encounter Diagnoses   Name Primary?    Degeneration of intervertebral disc of lumbar region with discogenic back pain Yes    Chronic left-sided low back pain with left-sided sciatica     Impaired flexibility of lower extremity     Decreased strength of trunk and back     Numbness and tingling of left lower extremity     Tenderness of back      Physician: Bhavna Gomez MD    Visit Date: 1/6/2025    Medical Diagnosis from Referral: M51.360- Degeneration of intervertebral disc of lumbar region with discogenic back pain  Evaluation Date: 12/3/2024  Authorization Period Expiration: 2/14/25  Plan of Care Expiration: 2/14/25  Reassessment / Plan of Care completed: RA- 12/3/24  Next Reassessment / Plan of Care due: 1/27/25  Visit # / Visits authorized: 3/16       Precautions: Standard     PTA Visit #: 2/5     Time In: 0855  Time Out: 0933  Total Billable Time: 38 minutes    Subjective     Pt reports: the traction didn't do anything one way or the other, but he did a lot of climbing this weekend and has increased tingling pain in the L hip/quad.    He was not compliant with home exercise program.  Response to previous treatment: same  Functional change: n/a    Pain: 4/10 without medication for pain  Location: across lumbar spine     Functional lumbar FOTO score: 47 (12/30/24), 53 on initial eval (12/3/24)    Objective      Flexibility: muscle tightness in B hamstrings and piriformis L > R     Tenderness: lower lumbar spinous process, Left lumbar paraspinal muscles     Core muscle weakness affecting bending, lifting, carrying objects, prolonged unsupported standing, sitting, and walking      Posture: pt sits in slumped position causing posterior pelvic tilt and flattening of lumbar lordosis    Treatment     Amadeo received the treatments listed below:      therapeutic  exercises to develop strength, endurance, ROM, flexibility, posture, and core stabilization for 38 minutes including:  supervised modalities after being cleared for contradictions: Mechanical Traction:  Amadeo received intermittent mechanical traction to the lumbar spine at a force of 80 pounds for a total of 0minutes. Hold time of 30 sec and rest time for 10  sec. Patient tolerated treatment fairly without any adverse effects.     Therapeutic Exercise   Therapeutic Exercise Grid     Exercise 1  Exercise 2  Exercise 3  Exercise 4    Exercise :    B LE stretches:   Hamstrings with nerve glides B LE stretches:  Piriformis (manual) Bridges with SB Abdominal press with SB   Repetition/Time :    3 x 30 sec      15 x 5 secs   15 x 5 sec     Resist or Assist :                  Comment :                 Done :    yes yes yes yes                    Exercise 5  Exercise 6  Exercise 7  Exercise 8    Exercise :    DKTC with SB   LTR Prone press-ups B LE: SLR,  SL hip abd,  Prone hip ext   Repetition/Time :    15 x 5 sec   15   15 x 5 sec 15     Resist or Assist :             2 lbs     Comment :                  Done :    yes yes yes yes                    Exercise 9  Exercise 10  Exercise 11  Exercise 12    Exercise :    Sit to stands with extended weight   Dead lifts Mechanical Lumbar traction NuStep   (UE and LE)   Repetition/Time :    15      21 min   10 min     Resist or Assist :    5 lbs      80/40#  30 sec on/  10 sec off   L6,  A10,  S11     Comment :    Normal height mat              Done :    yes                       Exercise 13 Exercise 14  Exercise 15  Exercise 16   Exercise :    Bird dog   Soft tissue mobilizations to L piriformis           Repetition/Time :    15   5 min           Resist or Assist :    2 lbs              Comment :                  Done :    yes yes                          Patient Education and Home Exercises       Education provided:   - importance and benefits of performing HEP daily for optimal  improvements even after discharged from PT  - importance of strengthening core muscles and decreased muscle tightness to reduce stress on lumbar spine with daily activities  - proper technique with bending and lifting objects, maintaining good posture with daily activities to reduce stress on lumbar spine  - how lumbar traction works as treatment option in reducing LBP and radiating symptoms into LE  - anatomy of lumbar spine and how deficits cause pain and limit function    Written Home Exercises Provided: Patient instructed to cont prior HEP. Exercises were reviewed and Amadeo was able to demonstrate them prior to the end of the session.  Amadeo demonstrated good understanding of the education provided. See EMR under Patient Instructions for exercises provided during therapy sessions    Assessment     Amadeo demonstrated a positive response to treatment today as evidenced by completing all exercises with good effort and minimal reports of increased pain. Tone noted in L piriformis and soft tissue mobilizations were performed with some relief reported. He is making a fair progress toward goals as evidenced by compliance with HEP and intermittent pain with antalgic gait pattern. Recommend continue with current POC and progress patient as tolerated.     Amadeo Is not progressing thus far towards his goals.   Pt prognosis is Good.     Pt will continue to benefit from skilled outpatient physical therapy to address the deficits listed in the problem list box on initial evaluation, provide pt/family education and to maximize pt's level of independence in the home and community environment.     Pt's spiritual, cultural and educational needs considered and pt agreeable to plan of care and goals.     Anticipated barriers to physical therapy: chronic LBP with previous interventions     Goals:   Short Term Goals: 4 weeks      Pt will demonstrate knowledge and independence with HEP to continue with exercises outside of therapy to  facilitate optimal overall improvements- not met     Pt will improve functional score on lumbar FOTO by >10 points which indicates improved ability to perform daily tasks with less difficulty and pain- not met (47 (12/30/24), 53 on initial eval (12/3/24)     Reduce c/o pain in lumbar spine and L LE to 2/10 to improve tolerance with daily activities- progressing (c/o 2/10 pain level when he does not do anything but increased pain when he does things)     Long Term Goals: 8 weeks      Pt will improve functional score on lumbar FOTO by >20 points which indicates improved ability to perform daily tasks with less difficulty and pain     Reduce c/o pain in lumbar spine and L LE to 1/10 to improve tolerance with daily activities     Improve flexibility of B LE in order to reduce mechanical stressors on the lumbar spine     Pt will be able to stand / walk for >45 minutes with < 1/10 pain level in order to complete daily activities including grocery shopping, household chores, etc     Pt will demonstrate proper bending and lifting techniques to reduce stress on lumbar spine     Improve core strength as evidence by able to carry / lift heavy objects with <1/10 pain level    Plan     Continue with current POC, progress per pt's tolerance, and reassess pt at later date to determine need for additional therapy    Noe Madison, PTA

## 2025-01-08 NOTE — PROGRESS NOTES
[facility]   Physical Therapy Treatment Note      Name: Amadeo Jha  Clinic Number: [id number]    Therapy Diagnosis:   Encounter Diagnoses   Name Primary?    Degeneration of intervertebral disc of lumbar region with discogenic back pain Yes    Chronic left-sided low back pain with left-sided sciatica     Impaired flexibility of lower extremity     Decreased strength of trunk and back     Numbness and tingling of left lower extremity     Tenderness of back      Physician: Bhavna Gomez MD    Visit Date: 1/9/2025    Medical Diagnosis from Referral: M51.360- Degeneration of intervertebral disc of lumbar region with discogenic back pain  Evaluation Date: 12/3/2024  Authorization Period Expiration: 2/14/25  Plan of Care Expiration: 2/14/25  Reassessment / Plan of Care completed: RA- 12/3/24  Next Reassessment / Plan of Care due: 1/27/25  Visit # / Visits authorized: 4/16       Precautions: Standard     PTA Visit #: 3/5     Time In: 0800  Time Out: 0845  Total Billable Time: 45 minutes    Subjective     Pt reports: he has less pain today but continues to have stiffness. Wants to try mechanical lumbar traction again.    He was not compliant with home exercise program.  Response to previous treatment: same  Functional change: n/a    Pain: 2/10 without medication for pain  Location: across lumbar spine     Functional lumbar FOTO score: 47 (12/30/24), 53 on initial eval (12/3/24)    Objective      Flexibility: muscle tightness in B hamstrings and piriformis L > R     Tenderness: lower lumbar spinous process, Left lumbar paraspinal muscles     Core muscle weakness affecting bending, lifting, carrying objects, prolonged unsupported standing, sitting, and walking      Posture: pt sits in slumped position causing posterior pelvic tilt and flattening of lumbar lordosis    Treatment     Amadeo received the treatments listed below:      therapeutic exercises to develop strength, endurance, ROM,  flexibility, posture, and core stabilization for 15 minutes including:  supervised modalities after being cleared for contradictions: Mechanical Traction:  Amadeo received intermittent mechanical traction to the lumbar spine at a force of 80 pounds for a total of 25 minutes. Hold time of 30 sec and rest time for 10  sec. Patient tolerated treatment fairly without any adverse effects.     Therapeutic Exercise   Therapeutic Exercise Grid     Exercise 1  Exercise 2  Exercise 3  Exercise 4    Exercise :    B LE stretches:   Hamstrings with nerve glides B LE stretches:  Piriformis (manual) Bridges with SB Abdominal press with SB   Repetition/Time :    3 x 30 sec      15 x 5 secs   15 x 5 sec     Resist or Assist :                  Comment :                 Done :    yes yes                      Exercise 5  Exercise 6  Exercise 7  Exercise 8    Exercise :    DKTC with SB   LTR Prone press-ups B LE: SLR,  SL hip abd,  Prone hip ext   Repetition/Time :    15 x 5 sec   15   15 x 5 sec 15     Resist or Assist :             2 lbs     Comment :                  Done :                           Exercise 9  Exercise 10  Exercise 11  Exercise 12    Exercise :    Sit to stands with extended weight   Dead lifts Mechanical Lumbar traction NuStep   (UE and LE)   Repetition/Time :    15      21 min   10 min     Resist or Assist :    5 lbs      80/40#  30 sec on/  10 sec off   L6,  A10,  S11     Comment :    Normal height mat              Done :      yes yes                    Exercise 13 Exercise 14  Exercise 15  Exercise 16   Exercise :    Bird dog   Soft tissue mobilizations to L piriformis           Repetition/Time :    15   5 min           Resist or Assist :    2 lbs              Comment :                  Done :    yes yes                          Patient Education and Home Exercises       Education provided:   - importance and benefits of performing HEP daily for optimal improvements even after discharged from PT  - importance of  strengthening core muscles and decreased muscle tightness to reduce stress on lumbar spine with daily activities  - proper technique with bending and lifting objects, maintaining good posture with daily activities to reduce stress on lumbar spine  - how lumbar traction works as treatment option in reducing LBP and radiating symptoms into LE  - anatomy of lumbar spine and how deficits cause pain and limit function    Written Home Exercises Provided: Patient instructed to cont prior HEP. Exercises were reviewed and Amadeo was able to demonstrate them prior to the end of the session.  Amadeo demonstrated good understanding of the education provided. See EMR under Patient Instructions for exercises provided during therapy sessions    Assessment     After being cleared for contraindications, Amadeo demonstrated positive results from mechanical lumbar traction today as evidenced by report of mild relief following treatment. He is making a fair progress toward goals as evidenced by compliance with HEP and intermittent pain with antalgic gait pattern. Recommend continue with current POC and progress patient as tolerated.     Amadeo Is not progressing thus far towards his goals.   Pt prognosis is Good.     Pt will continue to benefit from skilled outpatient physical therapy to address the deficits listed in the problem list box on initial evaluation, provide pt/family education and to maximize pt's level of independence in the home and community environment.     Pt's spiritual, cultural and educational needs considered and pt agreeable to plan of care and goals.     Anticipated barriers to physical therapy: chronic LBP with previous interventions     Goals:   Short Term Goals: 4 weeks      Pt will demonstrate knowledge and independence with HEP to continue with exercises outside of therapy to facilitate optimal overall improvements- not met     Pt will improve functional score on lumbar FOTO by >10 points which indicates  improved ability to perform daily tasks with less difficulty and pain- not met (47 (12/30/24), 53 on initial eval (12/3/24)     Reduce c/o pain in lumbar spine and L LE to 2/10 to improve tolerance with daily activities- progressing (c/o 2/10 pain level when he does not do anything but increased pain when he does things)     Long Term Goals: 8 weeks      Pt will improve functional score on lumbar FOTO by >20 points which indicates improved ability to perform daily tasks with less difficulty and pain     Reduce c/o pain in lumbar spine and L LE to 1/10 to improve tolerance with daily activities     Improve flexibility of B LE in order to reduce mechanical stressors on the lumbar spine     Pt will be able to stand / walk for >45 minutes with < 1/10 pain level in order to complete daily activities including grocery shopping, household chores, etc     Pt will demonstrate proper bending and lifting techniques to reduce stress on lumbar spine     Improve core strength as evidence by able to carry / lift heavy objects with <1/10 pain level    Plan     Continue with current POC, progress per pt's tolerance, and reassess pt at later date to determine need for additional therapy    Noe Madison, PTA

## 2025-01-09 ENCOUNTER — CLINICAL SUPPORT (OUTPATIENT)
Dept: REHABILITATION | Facility: HOSPITAL | Age: 50
End: 2025-01-09
Payer: COMMERCIAL

## 2025-01-09 DIAGNOSIS — R20.2 NUMBNESS AND TINGLING OF LEFT LOWER EXTREMITY: ICD-10-CM

## 2025-01-09 DIAGNOSIS — R29.898 DECREASED STRENGTH OF TRUNK AND BACK: ICD-10-CM

## 2025-01-09 DIAGNOSIS — G89.29 CHRONIC LEFT-SIDED LOW BACK PAIN WITH LEFT-SIDED SCIATICA: ICD-10-CM

## 2025-01-09 DIAGNOSIS — M54.9 TENDERNESS OF BACK: ICD-10-CM

## 2025-01-09 DIAGNOSIS — R20.0 NUMBNESS AND TINGLING OF LEFT LOWER EXTREMITY: ICD-10-CM

## 2025-01-09 DIAGNOSIS — R29.898 IMPAIRED FLEXIBILITY OF LOWER EXTREMITY: ICD-10-CM

## 2025-01-09 DIAGNOSIS — M54.42 CHRONIC LEFT-SIDED LOW BACK PAIN WITH LEFT-SIDED SCIATICA: ICD-10-CM

## 2025-01-09 DIAGNOSIS — M51.360 DEGENERATION OF INTERVERTEBRAL DISC OF LUMBAR REGION WITH DISCOGENIC BACK PAIN: Primary | ICD-10-CM

## 2025-01-09 PROCEDURE — 97110 THERAPEUTIC EXERCISES: CPT | Mod: CQ

## 2025-01-13 ENCOUNTER — CLINICAL SUPPORT (OUTPATIENT)
Dept: REHABILITATION | Facility: HOSPITAL | Age: 50
End: 2025-01-13
Payer: COMMERCIAL

## 2025-01-13 DIAGNOSIS — R29.898 DECREASED STRENGTH OF TRUNK AND BACK: ICD-10-CM

## 2025-01-13 DIAGNOSIS — G89.29 CHRONIC LEFT-SIDED LOW BACK PAIN WITH LEFT-SIDED SCIATICA: ICD-10-CM

## 2025-01-13 DIAGNOSIS — R20.2 NUMBNESS AND TINGLING OF LEFT LOWER EXTREMITY: ICD-10-CM

## 2025-01-13 DIAGNOSIS — M54.9 TENDERNESS OF BACK: ICD-10-CM

## 2025-01-13 DIAGNOSIS — R20.0 NUMBNESS AND TINGLING OF LEFT LOWER EXTREMITY: ICD-10-CM

## 2025-01-13 DIAGNOSIS — M54.42 CHRONIC LEFT-SIDED LOW BACK PAIN WITH LEFT-SIDED SCIATICA: ICD-10-CM

## 2025-01-13 DIAGNOSIS — R29.898 IMPAIRED FLEXIBILITY OF LOWER EXTREMITY: ICD-10-CM

## 2025-01-13 DIAGNOSIS — M51.360 DEGENERATION OF INTERVERTEBRAL DISC OF LUMBAR REGION WITH DISCOGENIC BACK PAIN: Primary | ICD-10-CM

## 2025-01-13 PROCEDURE — 97110 THERAPEUTIC EXERCISES: CPT | Mod: CQ

## 2025-01-13 NOTE — PROGRESS NOTES
[facility]   Physical Therapy Treatment Note      Name: Amadeo Jha  Clinic Number: [id number]    Therapy Diagnosis:   Encounter Diagnoses   Name Primary?    Degeneration of intervertebral disc of lumbar region with discogenic back pain Yes    Chronic left-sided low back pain with left-sided sciatica     Impaired flexibility of lower extremity     Decreased strength of trunk and back     Numbness and tingling of left lower extremity     Tenderness of back      Physician: Bhavna Gomez MD    Visit Date: 1/13/2025    Medical Diagnosis from Referral: M51.360- Degeneration of intervertebral disc of lumbar region with discogenic back pain  Evaluation Date: 12/3/2024  Authorization Period Expiration: 2/14/25  Plan of Care Expiration: 2/14/25  Reassessment / Plan of Care completed: RA- 12/3/24  Next Reassessment / Plan of Care due: 1/27/25  Visit # / Visits authorized: 5/16       Precautions: Standard     PTA Visit #: 4/5     Time In: 0853  Time Out: 0930  Total Billable Time: 37 minutes    Subjective     Pt reports: he felt relief for two days after doing mechanical lumbar traction and just started feeling pain in the lower back yesterday.     He was not compliant with home exercise program.  Response to previous treatment: better  Functional change: n/a    Pain: 6/10 without medication for pain  Location: across lumbar spine     Functional lumbar FOTO score: 54 (1/13/25), 47 (12/30/24), 53 on initial eval (12/3/24)    Objective      Flexibility: muscle tightness in B hamstrings and piriformis L > R     Tenderness: lower lumbar spinous process, Left lumbar paraspinal muscles     Core muscle weakness affecting bending, lifting, carrying objects, prolonged unsupported standing, sitting, and walking      Posture: pt sits in slumped position causing posterior pelvic tilt and flattening of lumbar lordosis    Treatment     Amadeo received the treatments listed below:      therapeutic exercises  to develop strength, endurance, ROM, flexibility, posture, and core stabilization for 37 minutes including:  supervised modalities after being cleared for contradictions: Mechanical Traction:  Amadeo received intermittent mechanical traction to the lumbar spine at a force of 80 pounds for a total of 0 minutes. Hold time of 30 sec and rest time for 10  sec. Patient tolerated treatment fairly without any adverse effects.     Therapeutic Exercise   Therapeutic Exercise Grid     Exercise 1  Exercise 2  Exercise 3  Exercise 4    Exercise :    B LE stretches:   Hamstrings with nerve glides B LE stretches:  Piriformis (manual) Bridges with SB Abdominal press with SB   Repetition/Time :    3 x 30 sec      15 x 5 secs   15 x 5 sec     Resist or Assist :                  Comment :            **headlift     Done :    yes yes yes yes                    Exercise 5  Exercise 6  Exercise 7  Exercise 8    Exercise :    DKTC with SB   LTR Prone press-ups B LE: SLR,  SL hip abd,  Prone hip ext   Repetition/Time :    15 x 5 sec   15   15 x 5 sec 20     Resist or Assist :             #2   Comment :                  Done :    yes yes yes yes                    Exercise 9  Exercise 10  Exercise 11  Exercise 12    Exercise :    Sit to stands with extended weight   Dead lifts Mechanical Lumbar traction NuStep   (UE and LE)   Repetition/Time :    15      21 min   6 min     Resist or Assist :    5 lbs      80/40#  30 sec on/  10 sec off   L6,  A10,  S11     Comment :    Low height mat              Done :    yes   yes                    Exercise 13 Exercise 14  Exercise 15  Exercise 16   Exercise :    Bird dog   Soft tissue mobilizations to L piriformis   Dead bug march   Stand: hip abd and ext with TB     Repetition/Time :    15   5 min   3 x 5    15     Resist or Assist :    2 lbs         Yellow      Comment :                  Done :      yes   yes                    Patient Education and Home Exercises       Education provided:   -  importance and benefits of performing HEP daily for optimal improvements even after discharged from PT  - importance of strengthening core muscles and decreased muscle tightness to reduce stress on lumbar spine with daily activities  - proper technique with bending and lifting objects, maintaining good posture with daily activities to reduce stress on lumbar spine  - how lumbar traction works as treatment option in reducing LBP and radiating symptoms into LE  - anatomy of lumbar spine and how deficits cause pain and limit function    Written Home Exercises Provided: Patient instructed to cont prior HEP. Exercises were reviewed and Amadeo was able to demonstrate them prior to the end of the session.  Amadeo demonstrated good understanding of the education provided. See EMR under Patient Instructions for exercises provided during therapy sessions    Assessment     Amadeo demonstrated a positive response after today's session as evidenced by ability to increase repetitions with good effort. Mild pain in right lower back and upper buttocks area reported during exercises. He is making a good progress toward goals as evidenced by increased functional FOTO score and report of relief following mechanical lumbar traction. He continues to demonstrate deficits with intermittent pain with activities, core strength, and lower extremity strength. Recommend continue with current POC and progress patient as tolerated.     Amadeo Is not progressing thus far towards his goals.   Pt prognosis is Good.     Pt will continue to benefit from skilled outpatient physical therapy to address the deficits listed in the problem list box on initial evaluation, provide pt/family education and to maximize pt's level of independence in the home and community environment.     Pt's spiritual, cultural and educational needs considered and pt agreeable to plan of care and goals.     Anticipated barriers to physical therapy: chronic LBP with previous  interventions     Goals:   Short Term Goals: 4 weeks      Pt will demonstrate knowledge and independence with HEP to continue with exercises outside of therapy to facilitate optimal overall improvements- not met     Pt will improve functional score on lumbar FOTO by >10 points which indicates improved ability to perform daily tasks with less difficulty and pain- not met (47 (12/30/24), 53 on initial eval (12/3/24)     Reduce c/o pain in lumbar spine and L LE to 2/10 to improve tolerance with daily activities- progressing (c/o 2/10 pain level when he does not do anything but increased pain when he does things)     Long Term Goals: 8 weeks      Pt will improve functional score on lumbar FOTO by >20 points which indicates improved ability to perform daily tasks with less difficulty and pain     Reduce c/o pain in lumbar spine and L LE to 1/10 to improve tolerance with daily activities     Improve flexibility of B LE in order to reduce mechanical stressors on the lumbar spine     Pt will be able to stand / walk for >45 minutes with < 1/10 pain level in order to complete daily activities including grocery shopping, household chores, etc     Pt will demonstrate proper bending and lifting techniques to reduce stress on lumbar spine     Improve core strength as evidence by able to carry / lift heavy objects with <1/10 pain level    Plan     Continue with current POC, progress per pt's tolerance, and reassess pt at later date to determine need for additional therapy    Caro Finney PTA

## 2025-01-24 ENCOUNTER — CLINICAL SUPPORT (OUTPATIENT)
Dept: REHABILITATION | Facility: HOSPITAL | Age: 50
End: 2025-01-24
Payer: COMMERCIAL

## 2025-01-24 DIAGNOSIS — M51.360 DEGENERATION OF INTERVERTEBRAL DISC OF LUMBAR REGION WITH DISCOGENIC BACK PAIN: Primary | ICD-10-CM

## 2025-01-24 PROCEDURE — 97110 THERAPEUTIC EXERCISES: CPT | Mod: CQ

## 2025-01-24 NOTE — PROGRESS NOTES
Outpatient Rehab    Physical Therapy Visit    Patient Name: Amadeo Jha  MRN: [id number]  YOB: 1975  Today's Date: 1/24/2025  Therapy Diagnosis:        Encounter Diagnoses   Name Primary?    Degeneration of intervertebral disc of lumbar region with discogenic back pain Yes    Chronic left-sided low back pain with left-sided sciatica      Impaired flexibility of lower extremity      Decreased strength of trunk and back      Numbness and tingling of left lower extremity      Tenderness of back      Physician: Bhavna Gomez MD    Physician Orders: Eval and Treat    Medical Diagnosis from Referral: M51.360- Degeneration of intervertebral disc of lumbar region with discogenic back pain  Visit # / Visits authorized: 2/16    Evaluation Date: 12/3/2024  Insurance Authorization Period: 12/8/24 to 2/14/25  Plan of Care Certification:  12/8/24 to 2/14/25    Time In:0850   Time Out: 0930   Total time: 40 minutes         Subjective   Patient reported: no new issues today. Requested to do lumbar traction..  Pain reported as 4.      Past Medical History/Physical Systems Review:   Amadeo Jha  has a past medical history of Acute pain of right wrist, ADD (attention deficit disorder), Allergic rhinitis, Anxiety disorder, unspecified, Benign hypertensive heart disease without congestive heart failure, Carotid artery stenosis, Complete tear of scapholunate ligament, Depression, Deviated nasal septum, Deviated nasal septum, Dysphagia, Ganglion cyst of finger of right hand, GERD (gastroesophageal reflux disease), Gout, unspecified, Hyperglycemia, Hypertension, Insomnia, Left ventricular hypertrophy, Leukocytosis, Localized pain of right shoulder joint, Low back pain, unspecified, Lumbar radiculopathy, Mixed hyperlipidemia, Multiple joint pain, Nontraumatic rupture of muscle or tendon of rotator cuff of right shoulder, PINKY (obstructive sleep apnea), Other chronic allergic conjunctivitis, Pain, joint, shoulder,  Painful swallowing, Prolapsed lumbar disc, Spinal instabilities, lumbar region, Sprain of right wrist, Traumatic tear of right rotator cuff, Tricuspid valve regurgitation, and Vitamin D deficiency.    Amadeo Jha  has a past surgical history that includes left elbow; Cervical fusion; Knee arthroscopy w/ ACL reconstruction (Right); Repair of ligament of wrist (Right, 7/25/2022); Nasal septoplasty (N/A, 12/19/2023); excision, nasal turbinate, submucosal (Bilateral, 12/19/2023); Arthroscopic debridement of shoulder (Right, 3/27/2024); Arthroscopic repair of rotator cuff of shoulder (Right, 3/27/2024); Decompression of subacromial space (Right, 3/27/2024); and Arthroscopic tenotomy of biceps tendon (Right, 3/27/2024).    Amadeo has a current medication list which includes the following prescription(s): azelastine, celecoxib, dextroamphetamine-amphetamine, eszopiclone, famotidine, febuxostat, fluticasone propionate, hydrocodone-acetaminophen, ibuprofen, lansoprazole, levocetirizine, lidocaine, meloxicam, olmesartan-hydrochlorothiazide, pantoprazole, pregabalin, triamcinolone, verapamil, and vitamin d.    Review of patient's allergies indicates:  No Known Allergies     Objective            Treatment:  Therapeutic Exercise  Therapeutic Exercise Activity 1: Nustep warm up (see flowsheet)    Other Activity  Other Activity 1: Mechanical lumbar traction (see flowsheet)    Patient's spiritual, cultural, and educational needs considered and patient agreeable to plan of care and goals.     Assessment & Plan   Assessment: After being cleared for mechanical lumbar traction, Amadeo demonstrated (+) results from treatment today as evidenced by exhibiting fair tolerance following traction. He reported mild discomfort afterwards. He is making a fair progress toward goals as evidenced by continued intermittent pain and antalgic gait pattern.. Recommend continue with current POC and progress patient as tolerated.  Evaluation/Treatment  Tolerance: Patient tolerated treatment well  Education  Education was done with Patient. The patient's learning style includes Listening. The patient Verbalizes understanding.             Goals:   Short Term Goals: 4 weeks      Pt will demonstrate knowledge and independence with HEP to continue with exercises outside of therapy to facilitate optimal overall improvements- not met     Pt will improve functional score on lumbar FOTO by >10 points which indicates improved ability to perform daily tasks with less difficulty and pain- not met (47 (12/30/24), 53 on initial eval (12/3/24)     Reduce c/o pain in lumbar spine and L LE to 2/10 to improve tolerance with daily activities- progressing (c/o 2/10 pain level when he does not do anything but increased pain when he does things)     Long Term Goals: 8 weeks      Pt will improve functional score on lumbar FOTO by >20 points which indicates improved ability to perform daily tasks with less difficulty and pain     Reduce c/o pain in lumbar spine and L LE to 1/10 to improve tolerance with daily activities     Improve flexibility of B LE in order to reduce mechanical stressors on the lumbar spine     Pt will be able to stand / walk for >45 minutes with < 1/10 pain level in order to complete daily activities including grocery shopping, household chores, etc     Pt will demonstrate proper bending and lifting techniques to reduce stress on lumbar spine     Improve core strength as evidence by able to carry / lift heavy objects with <1/10 pain level       Plan: Continue with current POC, progress per pt's tolerance, and reassess pt at later date to determine need for additional therapy.

## 2025-01-27 ENCOUNTER — CLINICAL SUPPORT (OUTPATIENT)
Dept: REHABILITATION | Facility: HOSPITAL | Age: 50
End: 2025-01-27
Payer: COMMERCIAL

## 2025-01-27 ENCOUNTER — DOCUMENTATION ONLY (OUTPATIENT)
Dept: REHABILITATION | Facility: HOSPITAL | Age: 50
End: 2025-01-27
Payer: COMMERCIAL

## 2025-01-27 DIAGNOSIS — M54.9 TENDERNESS OF BACK: ICD-10-CM

## 2025-01-27 DIAGNOSIS — R20.2 NUMBNESS AND TINGLING OF LEFT LOWER EXTREMITY: ICD-10-CM

## 2025-01-27 DIAGNOSIS — M54.42 CHRONIC LEFT-SIDED LOW BACK PAIN WITH LEFT-SIDED SCIATICA: ICD-10-CM

## 2025-01-27 DIAGNOSIS — R20.0 NUMBNESS AND TINGLING OF LEFT LOWER EXTREMITY: ICD-10-CM

## 2025-01-27 DIAGNOSIS — R29.898 DECREASED STRENGTH OF TRUNK AND BACK: ICD-10-CM

## 2025-01-27 DIAGNOSIS — M51.360 DEGENERATION OF INTERVERTEBRAL DISC OF LUMBAR REGION WITH DISCOGENIC BACK PAIN: Primary | ICD-10-CM

## 2025-01-27 DIAGNOSIS — G89.29 CHRONIC LEFT-SIDED LOW BACK PAIN WITH LEFT-SIDED SCIATICA: ICD-10-CM

## 2025-01-27 DIAGNOSIS — R29.898 IMPAIRED FLEXIBILITY OF LOWER EXTREMITY: ICD-10-CM

## 2025-01-27 PROCEDURE — 97110 THERAPEUTIC EXERCISES: CPT

## 2025-01-27 NOTE — PROGRESS NOTES
Outpatient Rehab    Reassessment / Physical Therapy Visit    Patient Name: Amadeo Jha  MRN: [id number]  YOB: 1975  Today's Date: 1/27/2025  Therapy Diagnosis:        Encounter Diagnoses   Name Primary?    Degeneration of intervertebral disc of lumbar region with discogenic back pain Yes    Chronic left-sided low back pain with left-sided sciatica      Impaired flexibility of lower extremity      Decreased strength of trunk and back      Numbness and tingling of left lower extremity      Tenderness of back      Physician: Bhavna Gomez MD    Physician Orders: Eval and Treat    Medical Diagnosis from Referral: M51.360- Degeneration of intervertebral disc of lumbar region with discogenic back pain  Visit # / Visits authorized: 7/16    Evaluation Date: 12/3/2024  Insurance Authorization Period: 12/8/24 to 2/14/25  Plan of Care Certification:  12/8/24 to 2/14/25  Reassessment / Plan of Care: RA: 12/30/24, 1/27/25  Next Reassessment / Plan of Care due: 2/10/25    Time In:0845   Time Out: 0930   Total time: 45 minutes    PTA: 0/5       Subjective   his pain is a little worse this morning. his LBP is always worse when he first gets up in the morning after laying down. he has been out of Meloxicam since last week. symptoms in L LE remain the same, intermittent. nothing take for pain today.  Pain reported as 5.      Past Medical History/Physical Systems Review:   Amadeo Jha  has a past medical history of Acute pain of right wrist, ADD (attention deficit disorder), Allergic rhinitis, Anxiety disorder, unspecified, Benign hypertensive heart disease without congestive heart failure, Carotid artery stenosis, Complete tear of scapholunate ligament, Depression, Deviated nasal septum, Deviated nasal septum, Dysphagia, Ganglion cyst of finger of right hand, GERD (gastroesophageal reflux disease), Gout, unspecified, Hyperglycemia, Hypertension, Insomnia, Left ventricular hypertrophy, Leukocytosis,  Localized pain of right shoulder joint, Low back pain, unspecified, Lumbar radiculopathy, Mixed hyperlipidemia, Multiple joint pain, Nontraumatic rupture of muscle or tendon of rotator cuff of right shoulder, PINKY (obstructive sleep apnea), Other chronic allergic conjunctivitis, Pain, joint, shoulder, Painful swallowing, Prolapsed lumbar disc, Spinal instabilities, lumbar region, Sprain of right wrist, Traumatic tear of right rotator cuff, Tricuspid valve regurgitation, and Vitamin D deficiency.    Amadeo Jha  has a past surgical history that includes left elbow; Cervical fusion; Knee arthroscopy w/ ACL reconstruction (Right); Repair of ligament of wrist (Right, 7/25/2022); Nasal septoplasty (N/A, 12/19/2023); excision, nasal turbinate, submucosal (Bilateral, 12/19/2023); Arthroscopic debridement of shoulder (Right, 3/27/2024); Arthroscopic repair of rotator cuff of shoulder (Right, 3/27/2024); Decompression of subacromial space (Right, 3/27/2024); and Arthroscopic tenotomy of biceps tendon (Right, 3/27/2024).    Amadeo has a current medication list which includes the following prescription(s): azelastine, celecoxib, dextroamphetamine-amphetamine, eszopiclone, famotidine, febuxostat, fluticasone propionate, hydrocodone-acetaminophen, ibuprofen, lansoprazole, levocetirizine, lidocaine, meloxicam, olmesartan-hydrochlorothiazide, pantoprazole, pregabalin, triamcinolone, verapamil, and vitamin d.    Review of patient's allergies indicates:  No Known Allergies     Objective      Spinal Muscle Palpation     Tenderness in B lumbar paraspinal muscles                Lumbar Range of Motion   Active (deg) Passive (deg) Pain   Flexion 75   Yes   Extension         Right Lateral Flexion 75   Yes   Right Rotation 75   Yes   Left Lateral Flexion 75   Yes   Left Rotation 75   Yes            Hip Range of Motion   Right Hip   Active (deg) Passive (deg) Pain   Flexion 50       Extension         ABduction 50       ADduction          External Rotation 90/90 25   Yes   External Rotation Prone         Internal Rotation 90/90         Internal Rotation Prone             Left Hip   Active (deg) Passive (deg) Pain   Flexion 50       Extension         ABduction 50       ADduction         External Rotation 90/90 25   Yes   External Rotation Prone         Internal Rotation 90/90         Internal Rotation Prone                         Treatment:  Therapeutic Exercise  Therapeutic Exercise Activity 1: warm up: Nustep (UE and LE)  Therapeutic Exercise Activity 2: Core strengthening / ROM exercises: bridges, abdominal press with SB, LTR, DKTC with SB, prone press-up, prone hip extension, bird dog, dead bug, sit to stands with extended weight, dead lifts  Therapeutic Exercise Activity 3: stretches: B hamstrings         Patient's spiritual, cultural, and educational needs considered and patient agreeable to plan of care and goals.     Assessment & Plan   Assessment: Pt has completed 7 PT visits since initial eval. Pt progressing slowly with reduction of LBP, radiating symptoms in L LE, posture, and lumbar ROM continuing to limit tolerance with daily activities as evidence by minimal to no improvement in lumbar FOTO score. Increased lumbar discomfort after mechanical traction. Encouraged pt to attend scheduled PT visits 2 x week and perform HEP daily outside of therapy for optimal improvements  Evaluation/Treatment Tolerance: Patient limited by pain, Patient tolerated treatment well  Education  Education was done with Patient.           - importance and benefits of performing HEP daily for optimal improvements even after discharged from PT - importance of strengthening core muscles and decreased muscle tightness to reduce stress on lumbar spine with daily activities - proper technique with bending and lifting objects, maintaining good posture with daily activities to reduce stress on lumbar spine - how lumbar traction works as treatment option in reducing LBP  and radiating symptoms into LE - anatomy of lumbar spine and how deficits cause pain and limit function    Goals:   Short Term Goals: 4 weeks      Pt will demonstrate knowledge and independence with HEP to continue with exercises outside of therapy to facilitate optimal overall improvements- not met (pt reports noncompliance with HEP outside of therapy)     Pt will improve functional score on lumbar FOTO by >10 points which indicates improved ability to perform daily tasks with less difficulty and pain- not met (53 (1/27/25), 47 (12/30/24), 53 on initial eval (12/3/24)     Reduce c/o pain in lumbar spine and L LE to 2/10 to improve tolerance with daily activities- progressing (c/o 2-5/10 pain level worse first thing in the morning)     Long Term Goals: 8 weeks      Pt will improve functional score on lumbar FOTO by >20 points which indicates improved ability to perform daily tasks with less difficulty and pain     Reduce c/o pain in lumbar spine and L LE to 1/10 to improve tolerance with daily activities     Improve flexibility of B LE in order to reduce mechanical stressors on the lumbar spine     Pt will be able to stand / walk for >45 minutes with < 1/10 pain level in order to complete daily activities including grocery shopping, household chores, etc     Pt will demonstrate proper bending and lifting techniques to reduce stress on lumbar spine     Improve core strength as evidence by able to carry / lift heavy objects with <1/10 pain level       Plan: Continue with current POC, progress per pt's tolerance, and reassess pt at later date to determine need for additional therapy

## 2025-01-31 ENCOUNTER — CLINICAL SUPPORT (OUTPATIENT)
Dept: REHABILITATION | Facility: HOSPITAL | Age: 50
End: 2025-01-31
Payer: COMMERCIAL

## 2025-01-31 DIAGNOSIS — R20.0 NUMBNESS AND TINGLING OF LEFT LOWER EXTREMITY: ICD-10-CM

## 2025-01-31 DIAGNOSIS — M51.360 DEGENERATION OF INTERVERTEBRAL DISC OF LUMBAR REGION WITH DISCOGENIC BACK PAIN: Primary | ICD-10-CM

## 2025-01-31 DIAGNOSIS — R29.898 IMPAIRED FLEXIBILITY OF LOWER EXTREMITY: ICD-10-CM

## 2025-01-31 DIAGNOSIS — R20.2 NUMBNESS AND TINGLING OF LEFT LOWER EXTREMITY: ICD-10-CM

## 2025-01-31 DIAGNOSIS — M54.9 TENDERNESS OF BACK: ICD-10-CM

## 2025-01-31 DIAGNOSIS — R29.898 DECREASED STRENGTH OF TRUNK AND BACK: ICD-10-CM

## 2025-01-31 DIAGNOSIS — G89.29 CHRONIC LEFT-SIDED LOW BACK PAIN WITH LEFT-SIDED SCIATICA: ICD-10-CM

## 2025-01-31 DIAGNOSIS — M54.42 CHRONIC LEFT-SIDED LOW BACK PAIN WITH LEFT-SIDED SCIATICA: ICD-10-CM

## 2025-01-31 PROCEDURE — 97110 THERAPEUTIC EXERCISES: CPT | Mod: CQ

## 2025-01-31 NOTE — PROGRESS NOTES
Outpatient Rehab    Physical Therapy Visit    Patient Name: Amadeo Jha  MRN: [id number]  YOB: 1975  Today's Date: 1/31/2025    Therapy Diagnosis:   Encounter Diagnoses   Name Primary?    Degeneration of intervertebral disc of lumbar region with discogenic back pain Yes    Chronic left-sided low back pain with left-sided sciatica     Impaired flexibility of lower extremity     Decreased strength of trunk and back     Numbness and tingling of left lower extremity     Tenderness of back      Physician: Bhavna Gomez MD    Physician Orders: Eval and Treat  Medical Diagnosis from Referral: M51.360- Degeneration of intervertebral disc of lumbar region with discogenic back pain  Visit # / Visits authorized: 7/16    Evaluation Date: 12/3/2024  Insurance Authorization Period: 12/8/24 to 2/14/25  Plan of Care Certification:  12/8/24 to 2/14/25  Reassessment / Plan of Care: RA: 12/30/24, 1/27/25  Next Reassessment / Plan of Care due: 2/10/25    PTA Visit: 1/5       Time In: 0845   Time Out: 0930  Total Time: 45   Total Billable Time: 45         Subjective   Pateitn stated he was sore for several days after mechanical lumbar traction. Pain level is lower today, however..  Pain reported as 2.      Objective            Treatment:  Therapeutic Exercise  Therapeutic Exercise Activity 1: Nustep (UE and LE)- 10- min, Level 6, Seat 11  Therapeutic Exercise Activity 2: abdominal press with SB- [id number], LTR- 15, DKTC with SB- [id number], prone press-up- 15x5 sec, 3- way hip strengthening-20/2 lb, bird dog- 15, dead bug- 20, sit to stands with extended weight- 20/5lb, dead lifts- 15/18 lb    Manual Therapy  Manual Therapy Activity 1: Passive stretching: bilateral hamstring and piriformis    Patient's spiritual, cultural, and educational needs considered and patient agreeable to plan of care and goals.     Assessment & Plan   Assessment: Amadeo demonstrated a positive response after today's session as evidenced by  ability to increase repetitions with good effort and despite mild cramping in lower back and R HS. He is making good progress toward goals as evidenced by decreased pain level and increased exercise tolerance. He continues to demonstrate deficits with intermittent pain with activities, core strength, and lower extremity strength. Recommend continue with current POC and progress patient as tolerated.           Plan: Continue with current plan of care and progress as tolerated.    Goals: Short Term Goals: 4 weeks      Pt will demonstrate knowledge and independence with HEP to continue with exercises outside of therapy to facilitate optimal overall improvements- not met     Pt will improve functional score on lumbar FOTO by >10 points which indicates improved ability to perform daily tasks with less difficulty and pain- not met (47 (12/30/24), 53 on initial eval (12/3/24)     Reduce c/o pain in lumbar spine and L LE to 2/10 to improve tolerance with daily activities- progressing (c/o 2/10 pain level when he does not do anything but increased pain when he does things)     Long Term Goals: 8 weeks      Pt will improve functional score on lumbar FOTO by >20 points which indicates improved ability to perform daily tasks with less difficulty and pain     Reduce c/o pain in lumbar spine and L LE to 1/10 to improve tolerance with daily activities     Improve flexibility of B LE in order to reduce mechanical stressors on the lumbar spine     Pt will be able to stand / walk for >45 minutes with < 1/10 pain level in order to complete daily activities including grocery shopping, household chores, etc     Pt will demonstrate proper bending and lifting techniques to reduce stress on lumbar spine     Improve core strength as evidence by able to carry / lift heavy objects with <1/10 pain level

## 2025-02-03 ENCOUNTER — CLINICAL SUPPORT (OUTPATIENT)
Dept: REHABILITATION | Facility: HOSPITAL | Age: 50
End: 2025-02-03
Payer: COMMERCIAL

## 2025-02-03 DIAGNOSIS — R29.898 DECREASED STRENGTH OF TRUNK AND BACK: ICD-10-CM

## 2025-02-03 DIAGNOSIS — R20.2 NUMBNESS AND TINGLING OF LEFT LOWER EXTREMITY: ICD-10-CM

## 2025-02-03 DIAGNOSIS — M54.42 CHRONIC LEFT-SIDED LOW BACK PAIN WITH LEFT-SIDED SCIATICA: ICD-10-CM

## 2025-02-03 DIAGNOSIS — M51.360 DEGENERATION OF INTERVERTEBRAL DISC OF LUMBAR REGION WITH DISCOGENIC BACK PAIN: Primary | ICD-10-CM

## 2025-02-03 DIAGNOSIS — R29.898 IMPAIRED FLEXIBILITY OF LOWER EXTREMITY: ICD-10-CM

## 2025-02-03 DIAGNOSIS — M54.9 TENDERNESS OF BACK: ICD-10-CM

## 2025-02-03 DIAGNOSIS — G89.29 CHRONIC LEFT-SIDED LOW BACK PAIN WITH LEFT-SIDED SCIATICA: ICD-10-CM

## 2025-02-03 DIAGNOSIS — R20.0 NUMBNESS AND TINGLING OF LEFT LOWER EXTREMITY: ICD-10-CM

## 2025-02-03 PROCEDURE — 97110 THERAPEUTIC EXERCISES: CPT | Mod: CQ

## 2025-02-03 NOTE — PROGRESS NOTES
Outpatient Rehab    Physical Therapy Visit    Patient Name: Amadeo Jha  MRN: [id number]  YOB: 1975  Today's Date: 2/3/2025    Therapy Diagnosis:   Encounter Diagnoses   Name Primary?    Degeneration of intervertebral disc of lumbar region with discogenic back pain Yes    Chronic left-sided low back pain with left-sided sciatica     Impaired flexibility of lower extremity     Decreased strength of trunk and back     Numbness and tingling of left lower extremity     Tenderness of back      Physician: Bhavna Gomez MD    Physician Orders: Eval and Treat  Medical Diagnosis from Referral: M51.360- Degeneration of intervertebral disc of lumbar region with discogenic back pain    PTA visit #: 2/5  Visit # / Visits authorized: 9/16    Evaluation Date: 12/3/2024  Insurance Authorization Period: 12/8/24 to 2/14/25  Plan of Care Certification:  12/8/24 to 2/14/25  Next Reassessment / Plan of Care due: 2/10/25       Time In: 0800   Time Out: 0840  Total Time: 40        Subjective   Patient reported no new issues with the lower back..  Pain reported as 2.      Objective            Treatment:  Therapeutic Exercise  Therapeutic Exercise Activity 1: Nustep (UE and LE)- 8- min, Level 6, Seat 11  Therapeutic Exercise Activity 2: 3- way hip strengthening-20/2 lb, bird dog- 15, dead bug- 3X12  Therapeutic Exercise Activity 3: stretches: B hamstrings, LTR  Therapeutic Exercise Activity 4: core strengthening exercises in supine (see flowsheet)  Therapeutic Exercise Activity 5: standing hip abduction and extension with red TB, 2x10  Therapeutic Exercise Activity 6: safety squats 3x10 with #5 dumbbell         Patient's spiritual, cultural, and educational needs considered and patient agreeable to plan of care and goals.     Assessment & Plan   Assessment: Amadeo demonstrated a positive response after today's session as evidenced by fair tolerance to additional hip strengthening exercises. He is making good progress  toward goals as evidenced by decreased pain level and improved tolerance with progressive overload. He continues to demonstrate deficits with intermittent pain with activities, core strength, and lower extremity strength. Recommend continue with current POC and progress patient as tolerated.  Evaluation/Treatment Tolerance: Patient tolerated treatment well  Education  Education was done with Patient. The patient's learning style includes Listening. The patient Verbalizes understanding.         - importance and benefits of performing HEP daily for optimal improvements even after discharged from PT                                                                          - importance of strengthening core muscles and decreased muscle tightness to reduce stress on lumbar spine with daily activities                                       - proper technique with bending and lifting objects, maintaining good posture with daily activities to reduce stress on lumbar spine                                       - how lumbar traction works as treatment option in reducing LBP and radiating symptoms into LE                                                                                           - anatomy of lumbar spine and how deficits cause pain and limit function       Plan: Continue with current plan of care and progress as tolerated.    Goals: Short Term Goals: 4 weeks      Pt will demonstrate knowledge and independence with HEP to continue with exercises outside of therapy to facilitate optimal overall improvements- not met     Pt will improve functional score on lumbar FOTO by >10 points which indicates improved ability to perform daily tasks with less difficulty and pain- not met (47 (12/30/24), 53 on initial eval (12/3/24)     Reduce c/o pain in lumbar spine and L LE to 2/10 to improve tolerance with daily activities- progressing (c/o 2/10 pain level when he does not do anything but increased pain when he does things)      Long Term Goals: 8 weeks      Pt will improve functional score on lumbar FOTO by >20 points which indicates improved ability to perform daily tasks with less difficulty and pain     Reduce c/o pain in lumbar spine and L LE to 1/10 to improve tolerance with daily activities     Improve flexibility of B LE in order to reduce mechanical stressors on the lumbar spine     Pt will be able to stand / walk for >45 minutes with < 1/10 pain level in order to complete daily activities including grocery shopping, household chores, etc     Pt will demonstrate proper bending and lifting techniques to reduce stress on lumbar spine     Improve core strength as evidence by able to carry / lift heavy objects with <1/10 pain level

## 2025-02-07 ENCOUNTER — CLINICAL SUPPORT (OUTPATIENT)
Dept: REHABILITATION | Facility: HOSPITAL | Age: 50
End: 2025-02-07
Payer: COMMERCIAL

## 2025-02-07 DIAGNOSIS — G89.29 CHRONIC LEFT-SIDED LOW BACK PAIN WITH LEFT-SIDED SCIATICA: ICD-10-CM

## 2025-02-07 DIAGNOSIS — M51.360 DEGENERATION OF INTERVERTEBRAL DISC OF LUMBAR REGION WITH DISCOGENIC BACK PAIN: Primary | ICD-10-CM

## 2025-02-07 DIAGNOSIS — R29.898 DECREASED STRENGTH OF TRUNK AND BACK: ICD-10-CM

## 2025-02-07 DIAGNOSIS — M54.9 TENDERNESS OF BACK: ICD-10-CM

## 2025-02-07 DIAGNOSIS — R20.0 NUMBNESS AND TINGLING OF LEFT LOWER EXTREMITY: ICD-10-CM

## 2025-02-07 DIAGNOSIS — R20.2 NUMBNESS AND TINGLING OF LEFT LOWER EXTREMITY: ICD-10-CM

## 2025-02-07 DIAGNOSIS — M54.42 CHRONIC LEFT-SIDED LOW BACK PAIN WITH LEFT-SIDED SCIATICA: ICD-10-CM

## 2025-02-07 DIAGNOSIS — R29.898 IMPAIRED FLEXIBILITY OF LOWER EXTREMITY: ICD-10-CM

## 2025-02-07 PROCEDURE — 97110 THERAPEUTIC EXERCISES: CPT

## 2025-02-07 NOTE — PROGRESS NOTES
Outpatient Rehab    Physical Therapy Visit    Patient Name: Amadeo Jha  MRN: [id number]  YOB: 1975  Today's Date: 2/7/2025    Therapy Diagnosis:   Encounter Diagnoses   Name Primary?    Degeneration of intervertebral disc of lumbar region with discogenic back pain Yes    Chronic left-sided low back pain with left-sided sciatica     Impaired flexibility of lower extremity     Decreased strength of trunk and back     Numbness and tingling of left lower extremity     Tenderness of back      Physician: Bhavna Gomez MD    Physician Orders: Eval and Treat  Medical Diagnosis:  M51.360- Degeneration of intervertebral disc of lumbar region with discogenic back pain     PTA visit #: 2/5  Visit # / Visits authorized: 10/16    Evaluation Date: 12/3/2024  Insurance Authorization Period: 12/8/24 to 2/14/25  Plan of Care Certification:  12/8/24 to 2/14/25  Next Reassessment / Plan of Care due: 2/10/25     PTA: 2/5     Time In: 0800   Time Out: 0845  Total Time: 45          Subjective   minimal pain in low back and no radiating symptoms down L LE. no medication taken for pain. pt noncompliant with HEP outside of therapy. increased pain after mechanical lumbar traction.  Pain reported as 2/10. low back    Objective            Treatment:  Therapeutic Exercise  Therapeutic Exercise Activity 1: Nustep (UE and LE)- (see exercise sheet)    Other Activity  Other Activity 1: mechanical lumbar traction (see exercise sheet)    Patient's spiritual, cultural, and educational needs considered and patient agreeable to plan of care and goals.     Assessment & Plan   Assessment: Mechanical lumbar traciton performed to facilitate reduction of lumbar pain and radiating symptoms into L LE. educated pt on how traction works to facilitate reduction of possible nerve impingement causing deficits. pt c/o increased LBP and L LE numbness after completing traction. educated pt on reassessment next session and pt requested  additional PT  Evaluation/Treatment Tolerance: Patient limited by pain        Plan: Pt to continue with current POC, progress per pt's tolerance, and reassess pt to determine need for additional therapy    Goals:     Short Term Goals: 4 weeks      Pt will demonstrate knowledge and independence with HEP to continue with exercises outside of therapy to facilitate optimal overall improvements- not met     Pt will improve functional score on lumbar FOTO by >10 points which indicates improved ability to perform daily tasks with less difficulty and pain- not met (47 (12/30/24), 53 on initial eval (12/3/24)     Reduce c/o pain in lumbar spine and L LE to 2/10 to improve tolerance with daily activities- progressing (c/o 2/10 pain level when he does not do anything but increased pain when he does things)     Long Term Goals: 8 weeks      Pt will improve functional score on lumbar FOTO by >20 points which indicates improved ability to perform daily tasks with less difficulty and pain     Reduce c/o pain in lumbar spine and L LE to 1/10 to improve tolerance with daily activities     Improve flexibility of B LE in order to reduce mechanical stressors on the lumbar spine     Pt will be able to stand / walk for >45 minutes with < 1/10 pain level in order to complete daily activities including grocery shopping, household chores, etc     Pt will demonstrate proper bending and lifting techniques to reduce stress on lumbar spine     Improve core strength as evidence by able to carry / lift heavy objects with <1/10 pain level    Lanie Saunders, PT

## 2025-02-11 ENCOUNTER — DOCUMENTATION ONLY (OUTPATIENT)
Dept: REHABILITATION | Facility: HOSPITAL | Age: 50
End: 2025-02-11
Payer: COMMERCIAL

## 2025-02-11 ENCOUNTER — CLINICAL SUPPORT (OUTPATIENT)
Dept: REHABILITATION | Facility: HOSPITAL | Age: 50
End: 2025-02-11
Payer: COMMERCIAL

## 2025-02-11 DIAGNOSIS — G89.29 CHRONIC LEFT-SIDED LOW BACK PAIN WITH LEFT-SIDED SCIATICA: ICD-10-CM

## 2025-02-11 DIAGNOSIS — R29.898 DECREASED STRENGTH OF TRUNK AND BACK: ICD-10-CM

## 2025-02-11 DIAGNOSIS — M54.42 CHRONIC LEFT-SIDED LOW BACK PAIN WITH LEFT-SIDED SCIATICA: ICD-10-CM

## 2025-02-11 DIAGNOSIS — M51.360 DEGENERATION OF INTERVERTEBRAL DISC OF LUMBAR REGION WITH DISCOGENIC BACK PAIN: Primary | ICD-10-CM

## 2025-02-11 DIAGNOSIS — R20.0 NUMBNESS AND TINGLING OF LEFT LOWER EXTREMITY: ICD-10-CM

## 2025-02-11 DIAGNOSIS — R20.2 NUMBNESS AND TINGLING OF LEFT LOWER EXTREMITY: ICD-10-CM

## 2025-02-11 DIAGNOSIS — M54.9 TENDERNESS OF BACK: ICD-10-CM

## 2025-02-11 DIAGNOSIS — R29.898 IMPAIRED FLEXIBILITY OF LOWER EXTREMITY: ICD-10-CM

## 2025-02-11 PROCEDURE — 97110 THERAPEUTIC EXERCISES: CPT

## 2025-02-11 NOTE — PROGRESS NOTES
Outpatient Rehab    Physical Therapy Progress Note : Updated Plan of Care    Patient Name: Amadeo Jha  MRN: [id number]  YOB: 1975  Today's Date: 2/11/2025    Therapy Diagnosis:   Encounter Diagnoses   Name Primary?    Degeneration of intervertebral disc of lumbar region with discogenic back pain Yes    Chronic left-sided low back pain with left-sided sciatica     Impaired flexibility of lower extremity     Decreased strength of trunk and back     Numbness and tingling of left lower extremity     Tenderness of back      Physician: Bhavna Gomez MD    Physician Orders: Eval and Treat  Medical Diagnosis: M51.360- Degeneration of intervertebral disc of lumbar region with discogenic back pain     PTA visit #: 2/5  Visit # / Visits authorized: 11/16    Evaluation Date: 12/3/2024  Insurance Authorization Period: 12/8/24 to 2/14/25  Plan of Care Certification:  12/8/24 to 2/14/25  Reassessment / Plan of Care due: POC- 2/11/25   Next RA / POC due: 3/11/25     PTA: 0/5      Time In: 0930   Time Out: 1020  Total Time: 50       FOTO:  Intake Score: 53%  Survey Score 1: 54%  Survey Score 2:  %         Subjective   minimal pain in low back and no current radiating symptoms in L LE. He takes Meloxicam daily for pain relief. He had increased LBP initially after mechanical lumbar traction last session but pain reduced throughout the day. He doesn't have much LBP if he doesn't do much however always has pain the more he does. reports performs minimal HEP outside of therapy.  Pain reported as 1/10. low back    Objective   Posture        Shoulders are Rounded. Pelvic tilt observed: Posterior              Lumbar Range of Motion   Active (deg) Passive (deg) Pain   Flexion 100   Yes   Extension 100   Yes   Right Lateral Flexion 100       Right Rotation 100       Left Lateral Flexion 100       Left Rotation 100                Hip Range of Motion    Reduced tightness in B hamstrings. B hip external rotation very  limited since he has been young           Treatment:  Therapeutic Exercise  Therapeutic Exercise Activity 1: Nustep (UE and LE)- (see exercise sheet)  Therapeutic Exercise Activity 2: B hip strengthening mat exercises (see exercise sheet)  Therapeutic Exercise Activity 3: stretches: B hamstrings, LTR (see exercise sheet)  Therapeutic Exercise Activity 4: core strengthening exercises on mat (see flowsheet)    Assessment & Plan  Pt has completed 11 PT goals since initial eval. Pt progressing well but slowly with reduction of lumbar pain and radiating symptoms in L LE, lumbar ROM, reduced muscle tightness, core strengthening, and overall functional mobility allowing pt to be slightly less limited however no improvement in functional lumbar FOTO score. Pt continues to report noncompliance with HEP outside of therapy as well as maintaining good posture throughout the day. Discussed importance of performing HEP daily and maintaining good posture with all daily activities to facilitate reduction of pain.      Pt would benefit from continued PT 2 wk 4 to further address pt's deficits     Patient will continue to benefit from skilled outpatient physical therapy to address the deficits listed in the problem list box on initial evaluation, provide pt/family education and to maximize pt's level of independence in the home and community environment.     Patient's spiritual, cultural, and educational needs considered and patient agreeable to plan of care and goals.           Goals:   Active       PT goals       Pt will demonstrate knowledge and independence with HEP to continue with exercises outside of therapy to facilitate optimal overall improvements (Progressing)       Start:  02/11/25    Expected End:  03/21/25            Pt will improve functional score on lumbar FOTO score by >20 points which indicates improved ability to perform daily tasks with less difficulty and pain (Progressing)       Start:  02/11/25    Expected  End:  03/21/25            Reduce c/o pain in lumbar spine and L LE to <1/10 to improve tolerance with daily activities  (Progressing)       Start:  02/11/25    Expected End:  03/21/25            Improve flexibility in B LE in order to reduce mechanical stressors on lumbar spine (Progressing)       Start:  02/11/25    Expected End:  03/21/25            Pt will be able to stand / walk for >45 minutes with <1/10 pain level in order to complete daily activities including work, cooking, household chores, etc (Progressing)       Start:  02/11/25    Expected End:  03/21/25               Pt goals continued       Improve core strength as evidence by able to carry / lift heavy objects with <1/10 pain level (Progressing)       Start:  02/11/25    Expected End:  03/21/25                Lanie Saunders, PT

## 2025-02-13 ENCOUNTER — CLINICAL SUPPORT (OUTPATIENT)
Dept: REHABILITATION | Facility: HOSPITAL | Age: 50
End: 2025-02-13
Payer: COMMERCIAL

## 2025-02-13 DIAGNOSIS — M51.360 DEGENERATION OF INTERVERTEBRAL DISC OF LUMBAR REGION WITH DISCOGENIC BACK PAIN: Primary | ICD-10-CM

## 2025-02-13 DIAGNOSIS — R29.898 DECREASED STRENGTH OF TRUNK AND BACK: ICD-10-CM

## 2025-02-13 PROCEDURE — 97140 MANUAL THERAPY 1/> REGIONS: CPT | Mod: CQ

## 2025-02-13 PROCEDURE — 97110 THERAPEUTIC EXERCISES: CPT | Mod: CQ

## 2025-02-13 NOTE — PROGRESS NOTES
Outpatient Rehab    Physical Therapy Visit    Patient Name: Amadeo Jha  MRN: [id number]  YOB: 1975  Today's Date: 2/13/2025    Therapy Diagnosis: No diagnosis found.  Physician: Bhavna Gomez MD    Physician Orders: Eval and Treat    Visit # / Visits authorized: 12/16    Evaluation Date: 12/3/2024  Insurance Authorization Period: 12/8/24 to 2/14/25  Plan of Care Certification:  12/8/24 to 2/14/25  Reassessment / Plan of Care due: POC- 2/11/25   Next RA / POC due: 3/11/25     PTA: 1/5      Time In: 0800   Time Out: 0845  Total Time: 45       FOTO:  Intake Score:  %  Survey Score 1:  %  Survey Score 2:  %         Subjective   Patient states his back is improved, but his knees are hurting today..  Pain reported as 1/10. low back    Objective            Treatment:  Therapeutic Exercise  Therapeutic Exercise Activity 1: Nustep (UE and LE)- (see exercise sheet)  Therapeutic Exercise Activity 3: stretches: B hamstrings, LTR (see exercise sheet)    Other Activity  Other Activity 1: mechanical lumbar traction (see exercise sheet)    Assessment & Plan   Assessment: After being cleared for contraindications, Amadeo demonstrated positive results from mechanical lumbar traction today as evidenced by report of mild relief following treatment. He is making a good progress toward goals as evidenced by reports of reduced lower back pain.  Evaluation/Treatment Tolerance: Patient tolerated treatment well    Patient will continue to benefit from skilled outpatient physical therapy to address the deficits listed in the problem list box on initial evaluation, provide pt/family education and to maximize pt's level of independence in the home and community environment.     Patient's spiritual, cultural, and educational needs considered and patient agreeable to plan of care and goals.           Plan: Pt would benefit from continued PT 2 wk 4 to further address pt's deficits    Goals:   Active       PT goals       Pt  will demonstrate knowledge and independence with HEP to continue with exercises outside of therapy to facilitate optimal overall improvements (Progressing)       Start:  02/11/25    Expected End:  03/21/25            Pt will improve functional score on lumbar FOTO score by >20 points which indicates improved ability to perform daily tasks with less difficulty and pain (Progressing)       Start:  02/11/25    Expected End:  03/21/25            Reduce c/o pain in lumbar spine and L LE to <1/10 to improve tolerance with daily activities  (Progressing)       Start:  02/11/25    Expected End:  03/21/25            Improve flexibility in B LE in order to reduce mechanical stressors on lumbar spine (Progressing)       Start:  02/11/25    Expected End:  03/21/25            Pt will be able to stand / walk for >45 minutes with <1/10 pain level in order to complete daily activities including work, cooking, household chores, etc (Progressing)       Start:  02/11/25    Expected End:  03/21/25               Pt goals continued       Improve core strength as evidence by able to carry / lift heavy objects with <1/10 pain level (Progressing)       Start:  02/11/25    Expected End:  03/21/25                Noe Madison, PTA

## 2025-02-17 DIAGNOSIS — M51.360 DEGENERATION OF INTERVERTEBRAL DISC OF LUMBAR REGION WITH DISCOGENIC BACK PAIN: Primary | ICD-10-CM

## 2025-02-25 ENCOUNTER — CLINICAL SUPPORT (OUTPATIENT)
Dept: REHABILITATION | Facility: HOSPITAL | Age: 50
End: 2025-02-25
Payer: COMMERCIAL

## 2025-02-25 DIAGNOSIS — R29.898 IMPAIRED FLEXIBILITY OF LOWER EXTREMITY: ICD-10-CM

## 2025-02-25 DIAGNOSIS — R20.2 NUMBNESS AND TINGLING OF LEFT LOWER EXTREMITY: ICD-10-CM

## 2025-02-25 DIAGNOSIS — R20.0 NUMBNESS AND TINGLING OF LEFT LOWER EXTREMITY: ICD-10-CM

## 2025-02-25 DIAGNOSIS — M51.360 DEGENERATION OF INTERVERTEBRAL DISC OF LUMBAR REGION WITH DISCOGENIC BACK PAIN: ICD-10-CM

## 2025-02-25 DIAGNOSIS — M54.42 CHRONIC LEFT-SIDED LOW BACK PAIN WITH LEFT-SIDED SCIATICA: Primary | ICD-10-CM

## 2025-02-25 DIAGNOSIS — M54.9 TENDERNESS OF BACK: ICD-10-CM

## 2025-02-25 DIAGNOSIS — R29.898 DECREASED STRENGTH OF TRUNK AND BACK: ICD-10-CM

## 2025-02-25 DIAGNOSIS — G89.29 CHRONIC LEFT-SIDED LOW BACK PAIN WITH LEFT-SIDED SCIATICA: Primary | ICD-10-CM

## 2025-02-25 PROCEDURE — 97110 THERAPEUTIC EXERCISES: CPT

## 2025-02-25 NOTE — PROGRESS NOTES
Outpatient Rehab    Physical Therapy Visit    Patient Name: Amadeo Jha  MRN: [id number]  YOB: 1975  Encounter Date: 2/25/2025    Therapy Diagnosis:   Encounter Diagnoses   Name Primary?    Degeneration of intervertebral disc of lumbar region with discogenic back pain     Numbness and tingling of left lower extremity     Chronic left-sided low back pain with left-sided sciatica Yes    Decreased strength of trunk and back     Impaired flexibility of lower extremity     Tenderness of back      Physician: Bhavna Gomez MD    Physician Orders: Eval and Treat  Medical Diagnosis:  M51.360- Degeneration of intervertebral disc of lumbar region with discogenic back pain     Visit # / Visits authorized: 11/16    Evaluation Date: 12/3/2024  Insurance Authorization Period: 12/8/24 to 3/14/25  Plan of Care Certification:  12/8/24 to 3/14/25  Reassessment / Plan of Care due: POC- 2/11/25   Next RA / POC due: 3/11/25     PTA: 1/5      Time In: 0845   Time Out: 0930  Total Time: 45          Subjective   he has not attended PT due to he has been busy and forgot about 1 visit. He has not performed HEP outside of therapy. Increased LBP today but denies any numbness / tingling in L LE. he took Meloxicam today.  Pain reported as 5/10. low back    Objective            Treatment:  Therapeutic Exercise  Therapeutic Exercise Activity 1: Nustep (UE and LE)- (see exercise sheet)  Therapeutic Exercise Activity 2: B hip strengthening mat exercises (see exercise sheet)  Therapeutic Exercise Activity 3: stretches: B hamstrings, LTR (see exercise sheet)  Therapeutic Exercise Activity 4: core strengthening exercises on mat (see flowsheet)    Manual Therapy  Manual Therapy Activity 2: manual lumbar traction (see exercise sheet)    Assessment & Plan   Assessment: Pt tolerated stretches, core strengthening exercises, endurance warm-up, and manual lumbar traction fair with increased LBP today requiring frequent change in  positions. Pt has not attended PT in 12 days or performed HEP outside of therapy, discussed importance of both in facilitating reduction of pain and radiating symptoms. Attempted to stretch R QL muscle however pt has very limited hip ROM and did not feel with manual stretch in modified position  Evaluation/Treatment Tolerance: Patient limited by pain    Patient will continue to benefit from skilled outpatient physical therapy to address the deficits listed in the problem list box on initial evaluation, provide pt/family education and to maximize pt's level of independence in the home and community environment.     Patient's spiritual, cultural, and educational needs considered and patient agreeable to plan of care and goals.           Plan: Pt approved for 8 additional PT visits to further address pt's deficits    Goals:   Active       PT goals       Pt will demonstrate knowledge and independence with HEP to continue with exercises outside of therapy to facilitate optimal overall improvements (Progressing)       Start:  02/11/25    Expected End:  03/21/25            Pt will improve functional score on lumbar FOTO score by >20 points which indicates improved ability to perform daily tasks with less difficulty and pain (Progressing)       Start:  02/11/25    Expected End:  03/21/25            Reduce c/o pain in lumbar spine and L LE to <1/10 to improve tolerance with daily activities  (Progressing)       Start:  02/11/25    Expected End:  03/21/25            Improve flexibility in B LE in order to reduce mechanical stressors on lumbar spine (Progressing)       Start:  02/11/25    Expected End:  03/21/25            Pt will be able to stand / walk for >45 minutes with <1/10 pain level in order to complete daily activities including work, cooking, household chores, etc (Progressing)       Start:  02/11/25    Expected End:  03/21/25               Pt goals continued       Improve core strength as evidence by able to carry  / lift heavy objects with <1/10 pain level (Progressing)       Start:  02/11/25    Expected End:  03/21/25                Lanie Saunders, PT

## 2025-02-26 ENCOUNTER — OFFICE VISIT (OUTPATIENT)
Dept: NEUROSURGERY | Facility: CLINIC | Age: 50
End: 2025-02-26
Payer: COMMERCIAL

## 2025-02-26 VITALS
DIASTOLIC BLOOD PRESSURE: 96 MMHG | BODY MASS INDEX: 36.16 KG/M2 | HEART RATE: 101 BPM | WEIGHT: 267 LBS | SYSTOLIC BLOOD PRESSURE: 148 MMHG | HEIGHT: 72 IN | RESPIRATION RATE: 16 BRPM

## 2025-02-26 DIAGNOSIS — D17.79 EPIDURAL LIPOMATOSIS: ICD-10-CM

## 2025-02-26 DIAGNOSIS — M51.360 DEGENERATION OF INTERVERTEBRAL DISC OF LUMBAR REGION WITH DISCOGENIC BACK PAIN: Primary | ICD-10-CM

## 2025-02-26 PROCEDURE — 3008F BODY MASS INDEX DOCD: CPT | Mod: CPTII,,, | Performed by: PHYSICIAN ASSISTANT

## 2025-02-26 PROCEDURE — 1160F RVW MEDS BY RX/DR IN RCRD: CPT | Mod: CPTII,,, | Performed by: PHYSICIAN ASSISTANT

## 2025-02-26 PROCEDURE — 1159F MED LIST DOCD IN RCRD: CPT | Mod: CPTII,,, | Performed by: PHYSICIAN ASSISTANT

## 2025-02-26 PROCEDURE — 3075F SYST BP GE 130 - 139MM HG: CPT | Mod: CPTII,,, | Performed by: PHYSICIAN ASSISTANT

## 2025-02-26 PROCEDURE — 99213 OFFICE O/P EST LOW 20 MIN: CPT | Mod: ,,, | Performed by: PHYSICIAN ASSISTANT

## 2025-02-26 PROCEDURE — 3079F DIAST BP 80-89 MM HG: CPT | Mod: CPTII,,, | Performed by: PHYSICIAN ASSISTANT

## 2025-02-26 RX ORDER — TAMSULOSIN HYDROCHLORIDE 0.4 MG/1
0.4 CAPSULE ORAL DAILY
COMMUNITY

## 2025-02-26 NOTE — PROGRESS NOTES
[facility]  History & Physical  Neurosurgery      Amadeo Jha   [id number]   1975       SUBJECTIVE:     CHIEF COMPLAINT:  No chief complaint on file.      HPI:  Amadeo Jha is a 49 y.o. male who presents for follow up appointment after a course of physical therapy.  He was seen by Dr. Gomez for the initial visit on 11/21/2024 in regards to his lumbar spine.  His past medical history significant for hypertension, GERD, gout, ADD, anxiety, and depression.  He is s/p right rotator cuff surgery on 03/27/2024 by Dr. Foy.  On 03/29/2005, the patient underwent left C3-4 anterior foraminotomy, C4-5 ACDF, and left ulnar nerve decompression with Dr. Armstrong.      The patient complains of lower back pain, especially on the left side of the lower back.  He finds his symptoms have improved with physical therapy.  He no longer has pain into the left lower leg and foot.  He is not in pain today.  However, he has experienced increased pain this past week.  He thinks it is because he had not attended physical therapy in the past week.  He has continued to restrict his activities out of fear for increased pain.        Past Medical History:   Diagnosis Date    Acute pain of right wrist     ADD (attention deficit disorder)     Allergic rhinitis     Anxiety disorder, unspecified     Benign hypertensive heart disease without congestive heart failure     Carotid artery stenosis     Complete tear of scapholunate ligament     Depression     Deviated nasal septum     Deviated nasal septum     Dysphagia     Ganglion cyst of finger of right hand     GERD (gastroesophageal reflux disease)     Gout, unspecified     Hyperglycemia     Hypertension     Insomnia     Left ventricular hypertrophy     Leukocytosis     Localized pain of right shoulder joint     Low back pain, unspecified     Lumbar radiculopathy     Mixed hyperlipidemia     Multiple joint pain     Nontraumatic rupture of muscle or tendon of rotator cuff of  right shoulder     PINKY (obstructive sleep apnea)     no cpap    Other chronic allergic conjunctivitis     Pain, joint, shoulder     Painful swallowing     Prolapsed lumbar disc     Spinal instabilities, lumbar region     Sprain of right wrist     Traumatic tear of right rotator cuff     Tricuspid valve regurgitation     Vitamin D deficiency        Past Surgical History:   Procedure Laterality Date    ARTHROSCOPIC DEBRIDEMENT OF SHOULDER Right 3/27/2024    Procedure: DEBRIDEMENT, SHOULDER, ARTHROSCOPIC;  Surgeon: Eric Foy MD;  Location: [facility] OR;  Service: Orthopedics;  Laterality: Right;    ARTHROSCOPIC REPAIR OF ROTATOR CUFF OF SHOULDER Right 3/27/2024    Procedure: REPAIR, ROTATOR CUFF, ARTHROSCOPIC;  Surgeon: Eric Foy MD;  Location: [facility] OR;  Service: Orthopedics;  Laterality: Right;    ARTHROSCOPIC TENOTOMY OF BICEPS TENDON Right 3/27/2024    Procedure: TENOTOMY, BICEPS, ARTHROSCOPIC;  Surgeon: Eric Foy MD;  Location: [facility] OR;  Service: Orthopedics;  Laterality: Right;    CERVICAL FUSION      DECOMPRESSION OF SUBACROMIAL SPACE Right 3/27/2024    Procedure: DECOMPRESSION, SUBACROMIAL SPACE;  Surgeon: Eric Foy MD;  Location: [facility] OR;  Service: Orthopedics;  Laterality: Right;    EXCISION, NASAL TURBINATE, SUBMUCOSAL Bilateral 12/19/2023    Procedure: EXCISION, NASAL TURBINATE, SUBMUCOSAL;  Surgeon: Olayinka Harper MD;  Location: [facility] OR;  Service: ENT;  Laterality: Bilateral;    KNEE ARTHROSCOPY W/ ACL RECONSTRUCTION Right     left elbow      NASAL SEPTOPLASTY N/A 12/19/2023    Procedure: SEPTOPLASTY, NOSE;  Surgeon: Olayinka Harper MD;  Location: [facility] OR;  Service: ENT;  Laterality: N/A;    REPAIR OF LIGAMENT OF WRIST Right 7/25/2022    Procedure: REPAIR, LIGAMENT, WRIST;  Surgeon: Garry Gray MD;  Location: [facility] OR;  Service: Orthopedics;  Laterality: Right;       Family History   Problem Relation Name Age of Onset    No Known Problems Mother      No Known Problems Father       No Known Problems Sister      No Known Problems Brother         Social History     Socioeconomic History    Marital status:    Tobacco Use    Smoking status: Former     Types: Vaping with nicotine, Cigars     Quit date: 2025     Years since quittin.0    Smokeless tobacco: Never   Substance and Sexual Activity    Alcohol use: Yes     Alcohol/week: 1.0 standard drink of alcohol     Types: 1 Shots of liquor per week     Comment: on weekends    Drug use: Never    Sexual activity: Yes       Review of patient's allergies indicates:  No Known Allergies     Current Outpatient Medications   Medication Instructions    azelastine (ASTELIN) 137 mcg (0.1 %) nasal spray SMARTSIG:Both Nares    dextroamphetamine-amphetamine (ADDERALL) 20 mg tablet 1 tablet, 2 times daily    eszopiclone (LUNESTA) 3 mg Tab Take 1 tablet every day by oral route at bedtime.    febuxostat (ULORIC) 80 mg Tab 1 tablet, Daily    fluticasone propionate (FLONASE) 50 mcg/actuation nasal spray by Each Nostril route.    HYDROcodone-acetaminophen (NORCO)  mg per tablet 1 tablet    meloxicam (MOBIC) 15 MG tablet     olmesartan-hydrochlorothiazide (BENICAR HCT) 40-25 mg per tablet 1 tablet, Daily    pantoprazole (PROTONIX) 40 MG tablet Take 1 tablet every day by oral route in the morning, for REFLUX.    pregabalin (LYRICA) 75 mg, Daily    tamsulosin (FLOMAX) 0.4 mg, Daily    verapamiL (CALAN-SR) 120 MG CR tablet Take 1 tablet every day by oral route at bedtime.    vitamin D (VITAMIN D3) 5,000 Units, Daily       Review of Systems   Constitutional:  Negative for chills and fever.   HENT:  Negative for nosebleeds and sore throat.    Eyes:  Negative for pain and visual disturbance.   Respiratory:  Negative for cough, chest tightness and shortness of breath.    Cardiovascular:  Negative for chest pain.   Gastrointestinal:  Negative for diarrhea, nausea and vomiting.   Genitourinary:  Negative for difficulty urinating, dysuria and hematuria.    Musculoskeletal:  Positive for back pain. Negative for gait problem and myalgias.   Skin:  Negative for rash.   Neurological:  Negative for dizziness, facial asymmetry, weakness and headaches.   Psychiatric/Behavioral:  Negative for confusion and sleep disturbance. The patient is not nervous/anxious.        OBJECTIVE:       Visit Vitals  BP (!) 148/96 (BP Location: Left arm, Patient Position: Sitting)   Pulse 101   Resp 16   Ht 6' (1.829 m)   Wt 121.1 kg (267 lb)   BMI 36.21 kg/m²        General:  Pleasant, Well-nourished, Well-groomed.    Lungs:  Breathing is quiet with non-labored respirations.    Neurological:  The patient is awake, alert, and oriented in all 4 spheres.  His memory, cognition, and affect are appropriate.  Speech is fluent.  He is moving all extremities well.  Gait is normal.      Imaging:  All pertinent neuroimaging independently reviewed. Discussed these findings in detail with the patient.      ASSESSMENT:     1. Degeneration of intervertebral disc of lumbar region with discogenic back pain        2. Epidural lipomatosis          PLAN:     Options were discussed with the patient.  We discussed the importance of core strengthening as well as proper body mechanics.  We also touched on weight reduction to help with improvement for a healthy back.  The patient voiced understanding.  He will continue with his physical therapy.  We will have him return for follow-up on an as-needed basis.      A total of 12 minutes was spent face-to-face with the patient during this encounter.  Over half of that time was spent on counseling and coordination of care.  Additional time was used to reviewed the patient's chart including physical therapy notes, lumbar MRI and x-ray images, and work on office note.      Roxana Dexter PA-C      Disclaimer:  This note is prepared using voice recognition software and as such is likely to have errors despite attempts at proofreading.

## 2025-02-27 ENCOUNTER — CLINICAL SUPPORT (OUTPATIENT)
Dept: REHABILITATION | Facility: HOSPITAL | Age: 50
End: 2025-02-27
Payer: COMMERCIAL

## 2025-02-27 DIAGNOSIS — R29.898 IMPAIRED FLEXIBILITY OF LOWER EXTREMITY: ICD-10-CM

## 2025-02-27 DIAGNOSIS — M51.360 DEGENERATION OF INTERVERTEBRAL DISC OF LUMBAR REGION WITH DISCOGENIC BACK PAIN: Primary | ICD-10-CM

## 2025-02-27 DIAGNOSIS — R29.898 DECREASED STRENGTH OF TRUNK AND BACK: ICD-10-CM

## 2025-02-27 PROCEDURE — 97110 THERAPEUTIC EXERCISES: CPT | Mod: CQ

## 2025-02-27 NOTE — PROGRESS NOTES
Outpatient Rehab    Physical Therapy Visit    Patient Name: Amadeo Jha  MRN: [id number]  YOB: 1975  Encounter Date: 2/27/2025    Therapy Diagnosis:   Encounter Diagnoses   Name Primary?    Degeneration of intervertebral disc of lumbar region with discogenic back pain Yes    Decreased strength of trunk and back     Impaired flexibility of lower extremity      Physician: Bhavna Gomez MD    Physician Orders: Eval and Treat  Medical Diagnosis:  M51.360- Degeneration of intervertebral disc of lumbar region with discogenic back pain     Visit # / Visits authorized: 2/8  Evaluation Date: 12/3/2024  Insurance Authorization Period: 2/17/25 to 3/14/25  Plan of Care Certification:  2/17/25 to 3/14/25  Reassessment / Plan of Care due: POC- 2/11/25   Next RA / POC due: 3/11/25     PTA: 1/5      Time In: 1245   Time Out: 1330  Total Time: 45          Subjective   He wants to do mechanical traction today. The pain is not as bad today but he did take some Meloxicam..  Pain reported as 3/10. low back    Objective            Treatment:  Therapeutic Exercise  Therapeutic Exercise Activity 1: Nustep (UE and LE)- (see exercise sheet)    Manual Therapy  Manual Therapy Activity 1: Stretching of bilateral hamstrings with nerve glides    Assessment & Plan   Assessment: After being cleared for contraindications, patient demonstrated a (+) outcome after mechanical lumbar traction by reporting decreased in low back pain. He is making a good progression toward goals as evidenced by decreased symptoms reported and gradual decrease in pain level with activities outside of PT.  Evaluation/Treatment Tolerance: Patient tolerated treatment well    Patient will continue to benefit from skilled outpatient physical therapy to address the deficits listed in the problem list box on initial evaluation, provide pt/family education and to maximize pt's level of independence in the home and community environment.     Patient's  spiritual, cultural, and educational needs considered and patient agreeable to plan of care and goals.     Education  Education was done with Patient. The patient's learning style includes Listening. The patient Verbalizes understanding.         - importance and benefits of performing HEP daily for optimal improvements even after discharged from PT                                                                          - importance of strengthening core muscles and decreased muscle tightness to reduce stress on lumbar spine with daily activities                                       - proper technique with bending and lifting objects, maintaining good posture with daily activities to reduce stress on lumbar spine                                       - how lumbar traction works as treatment option in reducing LBP and radiating symptoms into LE                                                                                           - anatomy of lumbar spine and how deficits cause pain and limit function       Plan: Continue with plan of care.    Goals:   Active       PT goals       Pt will demonstrate knowledge and independence with HEP to continue with exercises outside of therapy to facilitate optimal overall improvements (Progressing)       Start:  02/11/25    Expected End:  03/21/25            Pt will improve functional score on lumbar FOTO score by >20 points which indicates improved ability to perform daily tasks with less difficulty and pain (Progressing)       Start:  02/11/25    Expected End:  03/21/25            Reduce c/o pain in lumbar spine and L LE to <1/10 to improve tolerance with daily activities  (Progressing)       Start:  02/11/25    Expected End:  03/21/25            Improve flexibility in B LE in order to reduce mechanical stressors on lumbar spine (Progressing)       Start:  02/11/25    Expected End:  03/21/25            Pt will be able to stand / walk for >45 minutes with <1/10 pain level in  order to complete daily activities including work, cooking, household chores, etc (Progressing)       Start:  02/11/25    Expected End:  03/21/25               Pt goals continued       Improve core strength as evidence by able to carry / lift heavy objects with <1/10 pain level (Progressing)       Start:  02/11/25    Expected End:  03/21/25                Caro Finney, MARIO ALBERTO

## 2025-03-03 ENCOUNTER — CLINICAL SUPPORT (OUTPATIENT)
Dept: REHABILITATION | Facility: HOSPITAL | Age: 50
End: 2025-03-03
Payer: COMMERCIAL

## 2025-03-03 DIAGNOSIS — R20.0 NUMBNESS AND TINGLING OF LEFT LOWER EXTREMITY: ICD-10-CM

## 2025-03-03 DIAGNOSIS — M54.42 CHRONIC LEFT-SIDED LOW BACK PAIN WITH LEFT-SIDED SCIATICA: ICD-10-CM

## 2025-03-03 DIAGNOSIS — M51.360 DEGENERATION OF INTERVERTEBRAL DISC OF LUMBAR REGION WITH DISCOGENIC BACK PAIN: Primary | ICD-10-CM

## 2025-03-03 DIAGNOSIS — G89.29 CHRONIC LEFT-SIDED LOW BACK PAIN WITH LEFT-SIDED SCIATICA: ICD-10-CM

## 2025-03-03 DIAGNOSIS — R20.2 NUMBNESS AND TINGLING OF LEFT LOWER EXTREMITY: ICD-10-CM

## 2025-03-03 DIAGNOSIS — M79.605 LOW BACK PAIN RADIATING TO LEFT LEG: ICD-10-CM

## 2025-03-03 DIAGNOSIS — M54.50 LOW BACK PAIN RADIATING TO LEFT LEG: ICD-10-CM

## 2025-03-03 DIAGNOSIS — R29.898 DECREASED STRENGTH OF TRUNK AND BACK: ICD-10-CM

## 2025-03-03 PROCEDURE — 97110 THERAPEUTIC EXERCISES: CPT | Mod: CQ

## 2025-03-03 PROCEDURE — 97140 MANUAL THERAPY 1/> REGIONS: CPT | Mod: CQ

## 2025-03-03 NOTE — PROGRESS NOTES
"  Outpatient Rehab    Physical Therapy Visit    Patient Name: Amadeo Jha  MRN: [id number]  YOB: 1975  Encounter Date: 3/3/2025    Therapy Diagnosis:   Encounter Diagnoses   Name Primary?    Degeneration of intervertebral disc of lumbar region with discogenic back pain Yes    Numbness and tingling of left lower extremity     Low back pain radiating to left leg     Decreased strength of trunk and back     Chronic left-sided low back pain with left-sided sciatica      Physician: Bhavna Gomez MD    Physician Orders: Eval and Treat  Medical Diagnosis:  M51.360- Degeneration of intervertebral disc of lumbar region with discogenic back pain     Visit # / Visits authorized: 3/8  Evaluation Date: 12/3/2024  Insurance Authorization Period: 2/17/25 to 3/14/25  Plan of Care Certification:  2/17/25 to 3/14/25  Reassessment / Plan of Care due: POC- 2/11/25   Next RA / POC due: 3/11/25     PTA: 2/5      Time In: 0845   Time Out: 0927  Total Time: 42   Total Billable Time: 42    FOTO:  Intake Score:  %  Survey Score 1:  %  Survey Score 2:  %         Subjective   Pt states he "drank too much last night" and is feeling it..  Pain reported as 0/10. low back    Objective            Treatment:  Therapeutic Exercise  Therapeutic Exercise Activity 1: Nustep (UE and LE)- (see exercise sheet)  Therapeutic Exercise Activity 2: B hip strengthening mat exercises (see exercise sheet)  Therapeutic Exercise Activity 3: stretches: B hamstrings, LTR (see exercise sheet)  Therapeutic Exercise Activity 4: core strengthening exercises on mat (see flowsheet)    Manual Therapy  Manual Therapy Activity 1: Stretching of bilateral hamstrings with nerve glides  Manual Therapy Activity 2: manual lumbar traction (see exercise sheet)    Assessment & Plan   Assessment: Amadeo demonstrated a positive response after today's session as evidenced by ability to complete all exercises with good effort and no reported increase in pain. He is " making good progress toward goals as evidenced by decreased pain level and increased exercise tolerance. He continues to demonstrate deficits with intermittent pain with activities, core strength, and lower extremity strength.  Evaluation/Treatment Tolerance: Patient tolerated treatment well    Patient will continue to benefit from skilled outpatient physical therapy to address the deficits listed in the problem list box on initial evaluation, provide pt/family education and to maximize pt's level of independence in the home and community environment.     Patient's spiritual, cultural, and educational needs considered and patient agreeable to plan of care and goals.           Plan: Continue with plan of care.    Goals:   Active       PT goals       Pt will demonstrate knowledge and independence with HEP to continue with exercises outside of therapy to facilitate optimal overall improvements (Progressing)       Start:  02/11/25    Expected End:  03/21/25            Pt will improve functional score on lumbar FOTO score by >20 points which indicates improved ability to perform daily tasks with less difficulty and pain (Progressing)       Start:  02/11/25    Expected End:  03/21/25            Reduce c/o pain in lumbar spine and L LE to <1/10 to improve tolerance with daily activities  (Progressing)       Start:  02/11/25    Expected End:  03/21/25            Improve flexibility in B LE in order to reduce mechanical stressors on lumbar spine (Progressing)       Start:  02/11/25    Expected End:  03/21/25            Pt will be able to stand / walk for >45 minutes with <1/10 pain level in order to complete daily activities including work, cooking, household chores, etc (Progressing)       Start:  02/11/25    Expected End:  03/21/25               Pt goals continued       Improve core strength as evidence by able to carry / lift heavy objects with <1/10 pain level (Progressing)       Start:  02/11/25    Expected End:   03/21/25                Noe Madison, PTA

## 2025-03-06 ENCOUNTER — CLINICAL SUPPORT (OUTPATIENT)
Dept: REHABILITATION | Facility: HOSPITAL | Age: 50
End: 2025-03-06
Payer: COMMERCIAL

## 2025-03-06 DIAGNOSIS — G89.29 CHRONIC LEFT-SIDED LOW BACK PAIN WITH LEFT-SIDED SCIATICA: ICD-10-CM

## 2025-03-06 DIAGNOSIS — M54.42 CHRONIC LEFT-SIDED LOW BACK PAIN WITH LEFT-SIDED SCIATICA: ICD-10-CM

## 2025-03-06 DIAGNOSIS — R20.2 NUMBNESS AND TINGLING OF LEFT LOWER EXTREMITY: ICD-10-CM

## 2025-03-06 DIAGNOSIS — R20.0 NUMBNESS AND TINGLING OF LEFT LOWER EXTREMITY: ICD-10-CM

## 2025-03-06 DIAGNOSIS — M51.360 DEGENERATION OF INTERVERTEBRAL DISC OF LUMBAR REGION WITH DISCOGENIC BACK PAIN: Primary | ICD-10-CM

## 2025-03-06 PROCEDURE — 97110 THERAPEUTIC EXERCISES: CPT | Mod: CQ

## 2025-03-06 NOTE — PROGRESS NOTES
Outpatient Rehab    Physical Therapy Visit    Patient Name: Amadeo Jah  MRN: [id number]  YOB: 1975  Encounter Date: 3/6/2025    Therapy Diagnosis:   Encounter Diagnoses   Name Primary?    Degeneration of intervertebral disc of lumbar region with discogenic back pain Yes    Chronic left-sided low back pain with left-sided sciatica     Numbness and tingling of left lower extremity      Physician: Bhavna Gomez MD    Physician Orders: Eval and Treat  Medical Diagnosis:  M51.360- Degeneration of intervertebral disc of lumbar region with discogenic back pain     Visit # / Visits authorized: 4/8  Evaluation Date: 12/3/2024  Insurance Authorization Period: 2/17/25 to 3/14/25  Plan of Care Certification:  2/17/25 to 3/14/25  Reassessment / Plan of Care due: POC- 2/11/25   Next RA / POC due: 3/11/25     PTA: 3/5      Time In: 0845   Time Out: 0930  Total Time: 45     FOTO:  Intake Score:  %  Survey Score 1:  %  Survey Score 2:  %         Subjective   Patient stated he was hurting a little when he woke up this morning but now he feels fine..  Pain reported as 2/10. low back    Objective            Treatment:  Therapeutic Exercise  Therapeutic Exercise Activity 1: Nustep (UE and LE)- (see exercise sheet)         Assessment & Plan   Assessment: After being cleared for contraindications, Amadeo demonstrated positive results from mechanical lumbar traction today as evidenced by report of decreased pain level following treatment. He is making a good progress toward goals as evidenced by improved tolerance with activities outside of PT. Patient would continue to benefit from skilled PT interventions.  Evaluation/Treatment Tolerance: Patient tolerated treatment well    Patient will continue to benefit from skilled outpatient physical therapy to address the deficits listed in the problem list box on initial evaluation, provide pt/family education and to maximize pt's level of independence in the home and  community environment.     Patient's spiritual, cultural, and educational needs considered and patient agreeable to plan of care and goals.     Education  Education was done with Patient. The patient's learning style includes Listening. The patient Verbalizes understanding.         - importance and benefits of performing HEP daily for optimal improvements even after discharged from PT                                                                          - importance of strengthening core muscles and decreased muscle tightness to reduce stress on lumbar spine with daily activities                                       - proper technique with bending and lifting objects, maintaining good posture with daily activities to reduce stress on lumbar spine                                       - how lumbar traction works as treatment option in reducing LBP and radiating symptoms into LE                                                                                           - anatomy of lumbar spine and how deficits cause pain and limit function       Plan: Continue with plan of care and progress as able.    Goals:   Active       PT goals       Pt will demonstrate knowledge and independence with HEP to continue with exercises outside of therapy to facilitate optimal overall improvements (Progressing)       Start:  02/11/25    Expected End:  03/21/25            Pt will improve functional score on lumbar FOTO score by >20 points which indicates improved ability to perform daily tasks with less difficulty and pain (Progressing)       Start:  02/11/25    Expected End:  03/21/25            Reduce c/o pain in lumbar spine and L LE to <1/10 to improve tolerance with daily activities  (Progressing)       Start:  02/11/25    Expected End:  03/21/25            Improve flexibility in B LE in order to reduce mechanical stressors on lumbar spine (Progressing)       Start:  02/11/25    Expected End:  03/21/25            Pt will be  able to stand / walk for >45 minutes with <1/10 pain level in order to complete daily activities including work, cooking, household chores, etc (Progressing)       Start:  02/11/25    Expected End:  03/21/25               Pt goals continued       Improve core strength as evidence by able to carry / lift heavy objects with <1/10 pain level (Progressing)       Start:  02/11/25    Expected End:  03/21/25                Caro Finney, PTA

## 2025-03-11 ENCOUNTER — CLINICAL SUPPORT (OUTPATIENT)
Dept: REHABILITATION | Facility: HOSPITAL | Age: 50
End: 2025-03-11
Payer: COMMERCIAL

## 2025-03-11 ENCOUNTER — LAB VISIT (OUTPATIENT)
Dept: LAB | Facility: HOSPITAL | Age: 50
End: 2025-03-11
Attending: INTERNAL MEDICINE
Payer: COMMERCIAL

## 2025-03-11 DIAGNOSIS — R20.0 NUMBNESS AND TINGLING OF LEFT LOWER EXTREMITY: ICD-10-CM

## 2025-03-11 DIAGNOSIS — I11.9 MALIGNANT HYPERTENSIVE HEART DISEASE WITHOUT HEART FAILURE: ICD-10-CM

## 2025-03-11 DIAGNOSIS — M10.9 GOUT, UNSPECIFIED CAUSE, UNSPECIFIED CHRONICITY, UNSPECIFIED SITE: ICD-10-CM

## 2025-03-11 DIAGNOSIS — E55.9 AVITAMINOSIS D: ICD-10-CM

## 2025-03-11 DIAGNOSIS — K21.9 GASTROESOPHAGEAL REFLUX DISEASE, UNSPECIFIED WHETHER ESOPHAGITIS PRESENT: Primary | ICD-10-CM

## 2025-03-11 DIAGNOSIS — R20.2 NUMBNESS AND TINGLING OF LEFT LOWER EXTREMITY: ICD-10-CM

## 2025-03-11 DIAGNOSIS — M54.42 CHRONIC LEFT-SIDED LOW BACK PAIN WITH LEFT-SIDED SCIATICA: ICD-10-CM

## 2025-03-11 DIAGNOSIS — R73.9 BLOOD GLUCOSE ELEVATED: ICD-10-CM

## 2025-03-11 DIAGNOSIS — M54.9 TENDERNESS OF BACK: ICD-10-CM

## 2025-03-11 DIAGNOSIS — M51.360 DEGENERATION OF INTERVERTEBRAL DISC OF LUMBAR REGION WITH DISCOGENIC BACK PAIN: Primary | ICD-10-CM

## 2025-03-11 DIAGNOSIS — R29.898 DECREASED STRENGTH OF TRUNK AND BACK: ICD-10-CM

## 2025-03-11 DIAGNOSIS — G89.29 CHRONIC LEFT-SIDED LOW BACK PAIN WITH LEFT-SIDED SCIATICA: ICD-10-CM

## 2025-03-11 DIAGNOSIS — R29.898 IMPAIRED FLEXIBILITY OF LOWER EXTREMITY: ICD-10-CM

## 2025-03-11 DIAGNOSIS — E78.2 MIXED HYPERLIPIDEMIA: ICD-10-CM

## 2025-03-11 LAB
25(OH)D3+25(OH)D2 SERPL-MCNC: 75 NG/ML (ref 30–80)
ALBUMIN SERPL-MCNC: 4 G/DL (ref 3.5–5)
ALBUMIN/GLOB SERPL: 1.3 RATIO (ref 1.1–2)
ALP SERPL-CCNC: 40 UNIT/L (ref 40–150)
ALT SERPL-CCNC: 59 UNIT/L (ref 0–55)
ANION GAP SERPL CALC-SCNC: 7 MEQ/L
AST SERPL-CCNC: 26 UNIT/L (ref 5–34)
BASOPHILS # BLD AUTO: 0.03 X10(3)/MCL
BASOPHILS NFR BLD AUTO: 0.4 %
BILIRUB SERPL-MCNC: 0.7 MG/DL
BUN SERPL-MCNC: 18.7 MG/DL (ref 8.4–25.7)
CALCIUM SERPL-MCNC: 9.3 MG/DL (ref 8.4–10.2)
CHLORIDE SERPL-SCNC: 107 MMOL/L (ref 98–107)
CHOLEST SERPL-MCNC: 163 MG/DL
CHOLEST/HDLC SERPL: 4 {RATIO} (ref 0–5)
CO2 SERPL-SCNC: 26 MMOL/L (ref 22–29)
CREAT SERPL-MCNC: 0.98 MG/DL (ref 0.72–1.25)
CREAT UR-MCNC: 219 MG/DL (ref 63–166)
CREAT/UREA NIT SERPL: 19
EOSINOPHIL # BLD AUTO: 0.09 X10(3)/MCL (ref 0–0.9)
EOSINOPHIL NFR BLD AUTO: 1.3 %
ERYTHROCYTE [DISTWIDTH] IN BLOOD BY AUTOMATED COUNT: 12.7 % (ref 11.5–17)
EST. AVERAGE GLUCOSE BLD GHB EST-MCNC: 114 MG/DL
GFR SERPLBLD CREATININE-BSD FMLA CKD-EPI: >60 ML/MIN/1.73/M2
GLOBULIN SER-MCNC: 3.2 GM/DL (ref 2.4–3.5)
GLUCOSE SERPL-MCNC: 97 MG/DL (ref 74–100)
HBA1C MFR BLD: 5.6 %
HCT VFR BLD AUTO: 51.3 % (ref 42–52)
HDLC SERPL-MCNC: 39 MG/DL (ref 35–60)
HGB BLD-MCNC: 17.2 G/DL (ref 14–18)
IMM GRANULOCYTES # BLD AUTO: 0.01 X10(3)/MCL (ref 0–0.04)
IMM GRANULOCYTES NFR BLD AUTO: 0.1 %
LDLC SERPL CALC-MCNC: 105 MG/DL (ref 50–140)
LYMPHOCYTES # BLD AUTO: 1.4 X10(3)/MCL (ref 0.6–4.6)
LYMPHOCYTES NFR BLD AUTO: 19.6 %
MCH RBC QN AUTO: 29.9 PG (ref 27–31)
MCHC RBC AUTO-ENTMCNC: 33.5 G/DL (ref 33–36)
MCV RBC AUTO: 89.1 FL (ref 80–94)
MICROALBUMIN UR-MCNC: 7.8 UG/ML
MICROALBUMIN/CREAT RATIO PNL UR: 3.6 MG/GM CR (ref 0–30)
MONOCYTES # BLD AUTO: 0.69 X10(3)/MCL (ref 0.1–1.3)
MONOCYTES NFR BLD AUTO: 9.6 %
NEUTROPHILS # BLD AUTO: 4.94 X10(3)/MCL (ref 2.1–9.2)
NEUTROPHILS NFR BLD AUTO: 69 %
PLATELET # BLD AUTO: 191 X10(3)/MCL (ref 130–400)
PMV BLD AUTO: 9.3 FL (ref 7.4–10.4)
POTASSIUM SERPL-SCNC: 4 MMOL/L (ref 3.5–5.1)
PROT SERPL-MCNC: 7.2 GM/DL (ref 6.4–8.3)
RBC # BLD AUTO: 5.76 X10(6)/MCL (ref 4.7–6.1)
SODIUM SERPL-SCNC: 140 MMOL/L (ref 136–145)
TRIGL SERPL-MCNC: 94 MG/DL (ref 34–140)
URATE SERPL-MCNC: 3.3 MG/DL (ref 3.5–7.2)
VLDLC SERPL CALC-MCNC: 19 MG/DL
WBC # BLD AUTO: 7.16 X10(3)/MCL (ref 4.5–11.5)

## 2025-03-11 PROCEDURE — 80061 LIPID PANEL: CPT

## 2025-03-11 PROCEDURE — 82043 UR ALBUMIN QUANTITATIVE: CPT

## 2025-03-11 PROCEDURE — 84550 ASSAY OF BLOOD/URIC ACID: CPT

## 2025-03-11 PROCEDURE — 36415 COLL VENOUS BLD VENIPUNCTURE: CPT

## 2025-03-11 PROCEDURE — 82306 VITAMIN D 25 HYDROXY: CPT

## 2025-03-11 PROCEDURE — 80053 COMPREHEN METABOLIC PANEL: CPT

## 2025-03-11 PROCEDURE — 83036 HEMOGLOBIN GLYCOSYLATED A1C: CPT

## 2025-03-11 PROCEDURE — 85025 COMPLETE CBC W/AUTO DIFF WBC: CPT

## 2025-03-11 PROCEDURE — 97110 THERAPEUTIC EXERCISES: CPT

## 2025-03-11 NOTE — PROGRESS NOTES
Outpatient Rehab    Physical Therapy Discharge    Patient Name: Amadeo Jha  MRN: [id number]  YOB: 1975  Encounter Date: 3/11/2025    Therapy Diagnosis:   Encounter Diagnoses   Name Primary?    Degeneration of intervertebral disc of lumbar region with discogenic back pain Yes    Numbness and tingling of left lower extremity     Chronic left-sided low back pain with left-sided sciatica     Decreased strength of trunk and back     Impaired flexibility of lower extremity     Tenderness of back      Physician: Bhavna Gomez MD    Physician Orders: Eval and Treat  Medical Diagnosis:  M51.360- Degeneration of intervertebral disc of lumbar region with discogenic back pain     Visit # / Visits authorized: 4/8  Evaluation Date: 12/3/2024  Insurance Authorization Period: 2/17/25 to 3/14/25  Plan of Care Certification:  2/17/25 to 3/14/25  Reassessment / Plan of Care completed: POC- 2/11/25      Time In: 0800   Time Out: 0845  Total Time: 45       FOTO:  Intake Score: 53%  Survey Score 1: 65%  Survey Score 2: 76%         Subjective   Pt reports minimal symptoms in L LE compared to previously as well as minimal LBP when he woke this morning which is usually when his pain is the worse. He reports he perform HEP about 2 x week outside of therapy. He takes Meloxicam daily.  Pain reported as 2/10. low back    Objective      Lumbar Range of Motion   Active (deg) Passive (deg) Pain   Flexion 100       Extension 100       Right Lateral Flexion 100       Right Rotation 100       Left Lateral Flexion 100       Left Rotation 100                Hip Range of Motion    Reduced tightness in B hamstrings. B hip external rotation very limited since he has been young           Treatment:  Therapeutic Exercise  Therapeutic Exercise Activity 1: Nustep (UE and LE)- (see exercise sheet)  Therapeutic Exercise Activity 2: B hip strengthening mat exercises (see exercise sheet)  Therapeutic Exercise Activity 3: stretches: B  hamstrings, LTR (see exercise sheet)  Therapeutic Exercise Activity 4: core strengthening exercises on mat (see flowsheet)    Assessment & Plan   Assessment: Pt has completed 17 PT visits since initial eval. Pt has progressed well with reduction of pain in lumbar spine and radiating symptoms into L LE to minimal when he first wakes up in the morning and at the end of the day allowing pt to be less limited with daily activities as evidence by improved functional lumbar FOTO. Lumbar ROM WNL in all directions with no c/o pain. Pt continues to have tightness in B hips especially external rotation. Discussed progress pt has made thus far, upcoming end of authorization period, and pt requested early discharge from PT today due to him doing well and save him some money. Encouraged pt to continue with HEP daily  Evaluation/Treatment Tolerance: Patient tolerated treatment well    Patient's spiritual, cultural, and educational needs considered and patient agreeable to plan of care and goals.           Plan: Prepare pt for discharge from PT next session at end of authorization period    Goals:   Active       PT goals       Pt will demonstrate knowledge and independence with HEP to continue with exercises outside of therapy to facilitate optimal overall improvements (Progressing)       Start:  02/11/25    Expected End:  03/21/25            Pt will improve functional score on lumbar FOTO score by >20 points which indicates improved ability to perform daily tasks with less difficulty and pain (Met)       Start:  02/11/25    Expected End:  03/21/25    Resolved:  03/11/25         Reduce c/o pain in lumbar spine and L LE to <1/10 to improve tolerance with daily activities  (Met)       Start:  02/11/25    Expected End:  03/21/25    Resolved:  03/11/25         Improve flexibility in B LE in order to reduce mechanical stressors on lumbar spine (Progressing)       Start:  02/11/25    Expected End:  03/21/25            Pt will be able to  stand / walk for >45 minutes with <1/10 pain level in order to complete daily activities including work, cooking, household chores, etc (Met)       Start:  02/11/25    Expected End:  03/21/25    Resolved:  03/11/25           Resolved       Pt goals continued       Improve core strength as evidence by able to carry / lift heavy objects with <1/10 pain level (Met)       Start:  02/11/25    Expected End:  03/21/25    Resolved:  03/11/25             Lanie Saundesr, PT

## 2025-03-18 DIAGNOSIS — R10.12 ABDOMINAL PAIN, LEFT UPPER QUADRANT: Primary | ICD-10-CM

## 2025-03-20 ENCOUNTER — HOSPITAL ENCOUNTER (OUTPATIENT)
Dept: RADIOLOGY | Facility: HOSPITAL | Age: 50
Discharge: HOME | End: 2025-03-20
Attending: INTERNAL MEDICINE
Payer: COMMERCIAL

## 2025-03-20 DIAGNOSIS — R10.12 ABDOMINAL PAIN, LEFT UPPER QUADRANT: ICD-10-CM

## 2025-03-20 DIAGNOSIS — R10.12 ABDOMINAL PAIN, LEFT UPPER QUADRANT: Primary | ICD-10-CM

## 2025-03-20 PROCEDURE — 74019 RADEX ABDOMEN 2 VIEWS: CPT | Mod: TC

## 2025-03-20 PROCEDURE — 76700 US EXAM ABDOM COMPLETE: CPT | Mod: TC

## 2025-06-05 ENCOUNTER — LAB VISIT (OUTPATIENT)
Dept: LAB | Facility: HOSPITAL | Age: 50
End: 2025-06-05
Attending: INTERNAL MEDICINE
Payer: COMMERCIAL

## 2025-06-05 DIAGNOSIS — E55.9 AVITAMINOSIS D: ICD-10-CM

## 2025-06-05 DIAGNOSIS — M10.9 GOUT, UNSPECIFIED CAUSE, UNSPECIFIED CHRONICITY, UNSPECIFIED SITE: ICD-10-CM

## 2025-06-05 DIAGNOSIS — E78.2 MIXED HYPERLIPIDEMIA: ICD-10-CM

## 2025-06-05 DIAGNOSIS — K21.9 GASTROESOPHAGEAL REFLUX DISEASE, UNSPECIFIED WHETHER ESOPHAGITIS PRESENT: Primary | ICD-10-CM

## 2025-06-05 DIAGNOSIS — R73.9 BLOOD GLUCOSE ELEVATED: ICD-10-CM

## 2025-06-05 DIAGNOSIS — I11.9 MALIGNANT HYPERTENSIVE HEART DISEASE WITHOUT HEART FAILURE: ICD-10-CM

## 2025-06-05 LAB
25(OH)D3+25(OH)D2 SERPL-MCNC: 68 NG/ML (ref 30–80)
ALBUMIN SERPL-MCNC: 4.2 G/DL (ref 3.5–5)
ALBUMIN/GLOB SERPL: 1.4 RATIO (ref 1.1–2)
ALP SERPL-CCNC: 41 UNIT/L (ref 40–150)
ALT SERPL-CCNC: 50 UNIT/L (ref 0–55)
ANION GAP SERPL CALC-SCNC: 7 MEQ/L
AST SERPL-CCNC: 29 UNIT/L (ref 11–45)
BASOPHILS # BLD AUTO: 0.02 X10(3)/MCL
BASOPHILS NFR BLD AUTO: 0.4 %
BILIRUB SERPL-MCNC: 0.9 MG/DL
BUN SERPL-MCNC: 18 MG/DL (ref 8.4–25.7)
CALCIUM SERPL-MCNC: 9.3 MG/DL (ref 8.4–10.2)
CHLORIDE SERPL-SCNC: 104 MMOL/L (ref 98–107)
CHOLEST SERPL-MCNC: 159 MG/DL
CHOLEST/HDLC SERPL: 4 {RATIO} (ref 0–5)
CO2 SERPL-SCNC: 28 MMOL/L (ref 22–29)
CREAT SERPL-MCNC: 0.97 MG/DL (ref 0.72–1.25)
CREAT UR-MCNC: 53.7 MG/DL (ref 63–166)
CREAT/UREA NIT SERPL: 19
EOSINOPHIL # BLD AUTO: 0.05 X10(3)/MCL (ref 0–0.9)
EOSINOPHIL NFR BLD AUTO: 1 %
ERYTHROCYTE [DISTWIDTH] IN BLOOD BY AUTOMATED COUNT: 12.5 % (ref 11.5–17)
EST. AVERAGE GLUCOSE BLD GHB EST-MCNC: 108.3 MG/DL
GFR SERPLBLD CREATININE-BSD FMLA CKD-EPI: >60 ML/MIN/1.73/M2
GLOBULIN SER-MCNC: 3 GM/DL (ref 2.4–3.5)
GLUCOSE SERPL-MCNC: 86 MG/DL (ref 74–100)
HBA1C MFR BLD: 5.4 %
HCT VFR BLD AUTO: 48.7 % (ref 42–52)
HDLC SERPL-MCNC: 37 MG/DL (ref 35–60)
HGB BLD-MCNC: 16.5 G/DL (ref 14–18)
IMM GRANULOCYTES # BLD AUTO: 0 X10(3)/MCL (ref 0–0.04)
IMM GRANULOCYTES NFR BLD AUTO: 0 %
LDLC SERPL CALC-MCNC: 109 MG/DL (ref 50–140)
LYMPHOCYTES # BLD AUTO: 1.56 X10(3)/MCL (ref 0.6–4.6)
LYMPHOCYTES NFR BLD AUTO: 32.6 %
MCH RBC QN AUTO: 29.7 PG (ref 27–31)
MCHC RBC AUTO-ENTMCNC: 33.9 G/DL (ref 33–36)
MCV RBC AUTO: 87.7 FL (ref 80–94)
MICROALBUMIN UR-MCNC: <5 UG/ML
MICROALBUMIN/CREAT RATIO PNL UR: ABNORMAL
MONOCYTES # BLD AUTO: 0.41 X10(3)/MCL (ref 0.1–1.3)
MONOCYTES NFR BLD AUTO: 8.6 %
NEUTROPHILS # BLD AUTO: 2.74 X10(3)/MCL (ref 2.1–9.2)
NEUTROPHILS NFR BLD AUTO: 57.4 %
PLATELET # BLD AUTO: 218 X10(3)/MCL (ref 130–400)
PMV BLD AUTO: 9.2 FL (ref 7.4–10.4)
POTASSIUM SERPL-SCNC: 3.8 MMOL/L (ref 3.5–5.1)
PROT SERPL-MCNC: 7.2 GM/DL (ref 6.4–8.3)
RBC # BLD AUTO: 5.55 X10(6)/MCL (ref 4.7–6.1)
SODIUM SERPL-SCNC: 139 MMOL/L (ref 136–145)
TRIGL SERPL-MCNC: 67 MG/DL (ref 34–140)
URATE SERPL-MCNC: 3.2 MG/DL (ref 3.5–7.2)
VLDLC SERPL CALC-MCNC: 13 MG/DL
WBC # BLD AUTO: 4.78 X10(3)/MCL (ref 4.5–11.5)

## 2025-06-05 PROCEDURE — 80061 LIPID PANEL: CPT

## 2025-06-05 PROCEDURE — 82043 UR ALBUMIN QUANTITATIVE: CPT

## 2025-06-05 PROCEDURE — 85025 COMPLETE CBC W/AUTO DIFF WBC: CPT

## 2025-06-05 PROCEDURE — 83036 HEMOGLOBIN GLYCOSYLATED A1C: CPT

## 2025-06-05 PROCEDURE — 82306 VITAMIN D 25 HYDROXY: CPT

## 2025-06-05 PROCEDURE — 36415 COLL VENOUS BLD VENIPUNCTURE: CPT

## 2025-06-05 PROCEDURE — 84550 ASSAY OF BLOOD/URIC ACID: CPT

## 2025-06-05 PROCEDURE — 80053 COMPREHEN METABOLIC PANEL: CPT

## 2025-07-07 ENCOUNTER — LAB VISIT (OUTPATIENT)
Dept: LAB | Facility: HOSPITAL | Age: 50
End: 2025-07-07
Attending: INTERNAL MEDICINE
Payer: COMMERCIAL

## 2025-07-07 DIAGNOSIS — N52.9 IMPOTENCE OF ORGANIC ORIGIN: Primary | ICD-10-CM

## 2025-07-07 LAB
FSH SERPL-ACNC: 7.95 MIU/ML
LH SERPL-ACNC: 8.95 MIU/ML

## 2025-07-07 PROCEDURE — 84402 ASSAY OF FREE TESTOSTERONE: CPT

## 2025-07-07 PROCEDURE — 36415 COLL VENOUS BLD VENIPUNCTURE: CPT

## 2025-07-07 PROCEDURE — 83001 ASSAY OF GONADOTROPIN (FSH): CPT

## 2025-07-07 PROCEDURE — 83002 ASSAY OF GONADOTROPIN (LH): CPT

## 2025-07-18 LAB
TESTOST FREE SERPL-MCNC: 13.3 NG/DL (ref 4.06–15.6)
TESTOST SERPL-MCNC: 808 NG/DL (ref 240–950)

## (undated) DEVICE — GLOVE 8.0 PROTEXIS PI MICRO

## (undated) DEVICE — STRIP MEDI WND CLSR 1/2X4IN

## (undated) DEVICE — DRAPE U-DRAPE ADHESIVE 60X60IN

## (undated) DEVICE — DRESSING GAUZE OIL EMUL 3X8

## (undated) DEVICE — DRAPE HAND STERILE

## (undated) DEVICE — SOL NACL IRR 1000ML BTL

## (undated) DEVICE — KIT SURGICAL TURNOVER

## (undated) DEVICE — WRAP SELF ADH. COBAN 4X5YD

## (undated) DEVICE — BANDAGE VELCLOSE ELAS 3INX5YD

## (undated) DEVICE — DRESSING XEROFORM NONADH 1X8IN

## (undated) DEVICE — SUCTION COAGULATOR 10FR 6IN

## (undated) DEVICE — SUPPORT ULNA NERVE PROTECTOR

## (undated) DEVICE — SUT PLN GUT 4-0 SC-1SC-1 1

## (undated) DEVICE — GLOVE PROTEXIS BLUE LATEX 8.5

## (undated) DEVICE — BLADE SHAVER 4.5 6/BX

## (undated) DEVICE — NDL ANES SPINAL 18X3.5ST 18G

## (undated) DEVICE — SUT 3-0 MONOCRYL PLUS PS-2

## (undated) DEVICE — CUFF ATS 2 PORT SNGL BLDR 18IN

## (undated) DEVICE — GAUZE SPONGE 4X4 12PLY

## (undated) DEVICE — ELECTRODE PATIENT RETURN DISP

## (undated) DEVICE — DRAPE SHOULDER BEACH CHAIR

## (undated) DEVICE — ADHESIVE MASTISOL VIAL 48/BX

## (undated) DEVICE — BLADE SURG STAINLESS STEEL #11

## (undated) DEVICE — PENCIL ELECSURG ROCKER 15FT

## (undated) DEVICE — PACK ARTHROSCOPY

## (undated) DEVICE — GOWN POLY REINF X-LONG 2XL

## (undated) DEVICE — COVER MAYO STAND REINFRCD 30

## (undated) DEVICE — GLOVE PROTEXIS BLUE LATEX 6.5

## (undated) DEVICE — Device

## (undated) DEVICE — DRAPE ARTHSCP W/POUCH 92X119IN

## (undated) DEVICE — TAPE MEDIPORE 4IN X 2YDS

## (undated) DEVICE — MANIFOLD 4 PORT

## (undated) DEVICE — DRESSING N ADH OIL EMUL 3X3

## (undated) DEVICE — BLADE SURG STAINLESS STEEL #15

## (undated) DEVICE — MARKER WRITESITE SKIN CHLRAPRP

## (undated) DEVICE — BLADE SHAVER GREAT WHITE 3.5MM

## (undated) DEVICE — GLOVE PROTEXIS BLUE LATEX 8

## (undated) DEVICE — SOL NORMAL USPCA 0.9%

## (undated) DEVICE — GLOVE PROTEXIS LTX MICRO 8

## (undated) DEVICE — TRAY SKIN SCRUB WET PREMIUM

## (undated) DEVICE — SPONGE X-RAY LAP DETCT 18X18IN

## (undated) DEVICE — COVER PROXIMA MAYO STAND

## (undated) DEVICE — CLOSURE SKIN STERI STRIP 1/2X4

## (undated) DEVICE — PAD ABDOMINAL STERILE 8X10IN

## (undated) DEVICE — SPONGE GAUZE 16PLY 4X4

## (undated) DEVICE — SPONGE LAP STRL 18X18IN

## (undated) DEVICE — SPLINT INTRANASAL POSISEP .6X2

## (undated) DEVICE — HANDPIECE HYDRODEBRIDER

## (undated) DEVICE — SUT VICRYL CTD 2-0 GI 27 SH

## (undated) DEVICE — SOL NACL STRL BOTTLE 1000ML

## (undated) DEVICE — GLOVE PROTEXIS HYDROGEL SZ8

## (undated) DEVICE — TIP SUCTION YANKAUER

## (undated) DEVICE — SPLINT BREATHE-EASY NASAL REG

## (undated) DEVICE — TAPE BROADBAND TWO PACK 1.5MM

## (undated) DEVICE — SUT VICRYL PLUS 2-0 CT1 18

## (undated) DEVICE — APPLICATOR CHLORAPREP ORN 26ML

## (undated) DEVICE — SPONGE COTTON TRAY 4X4IN

## (undated) DEVICE — BLANKET SNUGGLE WARM LOWER BDY

## (undated) DEVICE — SEE MEDLINE ITEM 157059

## (undated) DEVICE — STOCKINET 4INX48

## (undated) DEVICE — GOWN X-LG STERILE BACK

## (undated) DEVICE — NDL SPINAL QUICKNE 22GA

## (undated) DEVICE — SUT PLN 4-0 P3 18IN GUT YEL

## (undated) DEVICE — NDL GUARD

## (undated) DEVICE — SUT VICRYL PLUS 0 CT1 18IN

## (undated) DEVICE — GAUZE VISTEC XR DTECT 16 4X4IN

## (undated) DEVICE — GLOVE PROTEXIS HYDROGEL SZ7.5

## (undated) DEVICE — SUT 5-0 CHROMIC GUT / P-3

## (undated) DEVICE — BLADE NARROW LONG 29.5MMX7.0MM

## (undated) DEVICE — GLOVE PROTEXIS LTX MICRO 6.5

## (undated) DEVICE — MEROCEL NASAL 2000 8X1.5X2CM

## (undated) DEVICE — HANDLE DEVON RIGID OR LIGHT

## (undated) DEVICE — STOCKINETTE IMPERVIOUS LARGE

## (undated) DEVICE — RESTRAINT HEAD BCH CHR W/ CLIP

## (undated) DEVICE — DRAPE STERI U-SHAPED 47X51IN

## (undated) DEVICE — SPLINT FIBERGLASS PAD 3X15

## (undated) DEVICE — SOL NACL IRR 3000ML

## (undated) DEVICE — POSITIONER HEAD ADULT

## (undated) DEVICE — PAD CAST SOF-ROL RAYON 4INX4YD

## (undated) DEVICE — SLING ARM LARGE FOAM STRAP

## (undated) DEVICE — SYR 50CC LL

## (undated) DEVICE — SUT ETHILON 4-0 PS4 18 BLK

## (undated) DEVICE — TUBE SET INFLOW/OUTFLOW

## (undated) DEVICE — NDL SPINAL 22GA 3.5 IN QUINCKE

## (undated) DEVICE — DRAPE FULL SHEET 70X100IN

## (undated) DEVICE — GLOVE 6.5 PROTEXIS PI MICRO

## (undated) DEVICE — BLADE SURG CARBON STEEL SZ11

## (undated) DEVICE — BANDAGE ACE NON LATEX 3IN

## (undated) DEVICE — GLOVE PROTEXIS BLUE LATEX 7

## (undated) DEVICE — GLOVE PROTEXIS LTX MICRO  7

## (undated) DEVICE — MAT QUICK 40X30 FLOOR FLUID LF

## (undated) DEVICE — QUATTRO SUTURE PASSER NEEDLE

## (undated) DEVICE — PADDING CAST SOFT-ROLL 4 X 4

## (undated) DEVICE — SLING SHOT II LARGE

## (undated) DEVICE — CONTAINER SPECIMEN OR STER 4OZ

## (undated) DEVICE — SUT BLK MONO 3-0 CUT 18IN F